# Patient Record
Sex: FEMALE | Race: WHITE | Employment: FULL TIME | URBAN - METROPOLITAN AREA
[De-identification: names, ages, dates, MRNs, and addresses within clinical notes are randomized per-mention and may not be internally consistent; named-entity substitution may affect disease eponyms.]

---

## 2020-08-25 ENCOUNTER — HOSPITAL ENCOUNTER (OUTPATIENT)
Age: 40
Discharge: HOME | End: 2020-08-25
Payer: COMMERCIAL

## 2020-08-25 ENCOUNTER — HOSPITAL ENCOUNTER (OUTPATIENT)
Dept: CT IMAGING | Age: 40
Discharge: HOME | End: 2020-08-27
Payer: COMMERCIAL

## 2020-08-25 LAB
ALBUMIN SERPL-MCNC: 4.1 G/DL (ref 3.5–5.2)
ALP BLD-CCNC: 74 U/L (ref 35–104)
ALT SERPL-CCNC: 94 U/L (ref 0–32)
AMYLASE: 43 U/L (ref 20–100)
ANION GAP SERPL CALCULATED.3IONS-SCNC: 10 MMOL/L (ref 7–16)
AST SERPL-CCNC: 138 U/L (ref 0–31)
BASOPHILS ABSOLUTE: 0.05 E9/L (ref 0–0.2)
BASOPHILS RELATIVE PERCENT: 0.8 % (ref 0–2)
BILIRUB SERPL-MCNC: 1 MG/DL (ref 0–1.2)
BUN BLDV-MCNC: 7 MG/DL (ref 6–20)
CALCIUM SERPL-MCNC: 9.5 MG/DL (ref 8.6–10.2)
CHLORIDE BLD-SCNC: 101 MMOL/L (ref 98–107)
CO2: 26 MMOL/L (ref 22–29)
CREAT SERPL-MCNC: 0.5 MG/DL (ref 0.5–1)
EOSINOPHILS ABSOLUTE: 0.05 E9/L (ref 0.05–0.5)
EOSINOPHILS RELATIVE PERCENT: 0.8 % (ref 0–6)
GFR AFRICAN AMERICAN: >60
GFR NON-AFRICAN AMERICAN: >60 ML/MIN/1.73
GLUCOSE BLD-MCNC: 89 MG/DL (ref 74–99)
HCT VFR BLD CALC: 43.8 % (ref 34–48)
HEMOGLOBIN: 14.8 G/DL (ref 11.5–15.5)
IMMATURE GRANULOCYTES #: 0.02 E9/L
IMMATURE GRANULOCYTES %: 0.3 % (ref 0–5)
LIPASE: 16 U/L (ref 13–60)
LYMPHOCYTES ABSOLUTE: 1.96 E9/L (ref 1.5–4)
LYMPHOCYTES RELATIVE PERCENT: 32.8 % (ref 20–42)
MCH RBC QN AUTO: 33.3 PG (ref 26–35)
MCHC RBC AUTO-ENTMCNC: 33.8 % (ref 32–34.5)
MCV RBC AUTO: 98.4 FL (ref 80–99.9)
MONOCYTES ABSOLUTE: 0.7 E9/L (ref 0.1–0.95)
MONOCYTES RELATIVE PERCENT: 11.7 % (ref 2–12)
NEUTROPHILS ABSOLUTE: 3.2 E9/L (ref 1.8–7.3)
NEUTROPHILS RELATIVE PERCENT: 53.6 % (ref 43–80)
PDW BLD-RTO: 13 FL (ref 11.5–15)
PLATELET # BLD: 186 E9/L (ref 130–450)
PMV BLD AUTO: 11.2 FL (ref 7–12)
POTASSIUM SERPL-SCNC: 4.2 MMOL/L (ref 3.5–5)
RBC # BLD: 4.45 E12/L (ref 3.5–5.5)
SODIUM BLD-SCNC: 137 MMOL/L (ref 132–146)
T4 FREE: 1.05 NG/DL (ref 0.93–1.7)
TOTAL PROTEIN: 6.8 G/DL (ref 6.4–8.3)
TSH SERPL DL<=0.05 MIU/L-ACNC: 4.06 UIU/ML (ref 0.27–4.2)
WBC # BLD: 6 E9/L (ref 4.5–11.5)

## 2020-08-25 PROCEDURE — 84439 ASSAY OF FREE THYROXINE: CPT

## 2020-08-25 PROCEDURE — 6360000004 HC RX CONTRAST MEDICATION: Performed by: RADIOLOGY

## 2020-08-25 PROCEDURE — 83690 ASSAY OF LIPASE: CPT

## 2020-08-25 PROCEDURE — 36415 COLL VENOUS BLD VENIPUNCTURE: CPT

## 2020-08-25 PROCEDURE — 82150 ASSAY OF AMYLASE: CPT

## 2020-08-25 PROCEDURE — 74174 CTA ABD&PLVS W/CONTRAST: CPT

## 2020-08-25 PROCEDURE — 80053 COMPREHEN METABOLIC PANEL: CPT

## 2020-08-25 PROCEDURE — 84443 ASSAY THYROID STIM HORMONE: CPT

## 2020-08-25 PROCEDURE — 85025 COMPLETE CBC W/AUTO DIFF WBC: CPT

## 2020-08-25 RX ADMIN — IOPAMIDOL 100 ML: 755 INJECTION, SOLUTION INTRAVENOUS at 16:53

## 2020-08-31 ENCOUNTER — HOSPITAL ENCOUNTER (OUTPATIENT)
Dept: ULTRASOUND IMAGING | Age: 40
Discharge: HOME | End: 2020-09-02
Payer: COMMERCIAL

## 2020-08-31 PROCEDURE — 76705 ECHO EXAM OF ABDOMEN: CPT

## 2020-09-23 ENCOUNTER — HOSPITAL ENCOUNTER (OUTPATIENT)
Dept: NON INVASIVE DIAGNOSTICS | Age: 40
Discharge: HOME | End: 2020-09-23
Payer: COMMERCIAL

## 2020-09-23 LAB
LV EF: 60 %
LVEF MODALITY: NORMAL

## 2020-09-23 PROCEDURE — 93306 TTE W/DOPPLER COMPLETE: CPT

## 2021-01-08 ENCOUNTER — HOSPITAL ENCOUNTER (OUTPATIENT)
Age: 41
Discharge: HOME | End: 2021-01-10

## 2021-01-08 PROCEDURE — 88305 TISSUE EXAM BY PATHOLOGIST: CPT

## 2021-01-08 PROCEDURE — 87081 CULTURE SCREEN ONLY: CPT

## 2021-01-09 LAB — CLOTEST: NORMAL

## 2021-09-12 ENCOUNTER — HOSPITAL ENCOUNTER (EMERGENCY)
Age: 41
Discharge: HOME | End: 2021-09-12
Attending: STUDENT IN AN ORGANIZED HEALTH CARE EDUCATION/TRAINING PROGRAM
Payer: COMMERCIAL

## 2021-09-12 VITALS
OXYGEN SATURATION: 96 % | DIASTOLIC BLOOD PRESSURE: 84 MMHG | RESPIRATION RATE: 16 BRPM | HEART RATE: 78 BPM | SYSTOLIC BLOOD PRESSURE: 137 MMHG | HEIGHT: 62 IN | WEIGHT: 142 LBS | TEMPERATURE: 97 F | BODY MASS INDEX: 26.13 KG/M2

## 2021-09-12 DIAGNOSIS — F10.20 ALCOHOL USE DISORDER, MODERATE, DEPENDENCE (HCC): Primary | ICD-10-CM

## 2021-09-12 LAB — METER GLUCOSE: 78 MG/DL (ref 74–99)

## 2021-09-12 PROCEDURE — 99284 EMERGENCY DEPT VISIT MOD MDM: CPT

## 2021-09-12 PROCEDURE — 82962 GLUCOSE BLOOD TEST: CPT

## 2021-09-13 NOTE — ED PROVIDER NOTES
Department of Emergency Medicine   ED  Provider Note  Admit Date/RoomTime: 9/12/2021  8:12 PM  ED Room: 19/19          History of Present Illness:  9/12/21, Time: 9:23 PM EDT  Chief Complaint   Patient presents with    Alcohol Problem     pt states she wants to be seen for alcohol abuse. denies any SI         Marilee Duncan is a 42 y.o. female presenting to the ED for Alcohol use. Apparently, the patient has been drinking about 1/5 a day for the past several years. She states she is ready to quit drinking. Denies any organic complaints at this time. She has been drinking today and her last drink was just prior to arrival.  Timing of her symptomatology is constant. She wants to go to an alcohol rehab facility. Denies any coingestions. Review of Systems:  Constitutional: [Denies] fevers  Eyes: [Denies blurry vision]  ENT: [Denies] sore throat  Cardiovascular: [Denies] chest pain  Respiratory: [Denies] shortness of breath  Gastrointestinal: [Denies] abdominal pain  Genitourinary: [Denies] dysuria  Musculoskeletal: [Denies] myalgias  Integumentary: [Denies] rashes  Neurological: [Denies] headache    --------------------------------------------- PAST HISTORY ---------------------------------------------  Past Medical History:  has a past medical history of Cooper-Danlos disease. Past Surgical History:  has a past surgical history that includes Tonsillectomy. Social History:  reports that she has been smoking. She has been smoking about 1.00 pack per day. She has never used smokeless tobacco. She reports current alcohol use. She reports that she does not use drugs. Family History: family history is not on file. . Unless otherwise noted, family history is non contributory    The patients home medications have been reviewed.     Allergies: Penicillins    I have reviewed the past medical history, past surgical history, social history, and family history    ---------------------------------------------------PHYSICAL EXAM--------------------------------------    Constitutional: [Appears in no distress]  Head: [Normocephalic, atraumatic]   Eyes: [Non-icteric slcera, no conjunctival injection]  ENT: [Moist mucous membranes,]  Neck: [Trachea midline, no JVD]  Respiratory: [Nonlabored respirations. Lungs clear to auscultation bilaterally, no wheezes, rales, or rhonchi.]   Cardiovascular:  [Regular rate. Regular rhythm. No murmurs, no gallops, no rubs.]   Gastrointestinal:  [Abdomen Soft, Non tender, Non distended. No rebound tenderness, guarding, or rigidity.]  Extremities: [No lower extremity edema]  Genitourinary: [No CVA tenderness, no suprapubic tenderness]  Musculoskeletal: [Moves all extremities, no deformity]  Skin: [Pink, warm, dry without rash.]  Neurologic: [Alert, symmetric facies, no aphasia]    -------------------------------------------------- RESULTS -------------------------------------------------  I have personally reviewed all laboratory and imaging results for this patient. Results are listed below. LABS: (Lab results interpreted by me)  Results for orders placed or performed during the hospital encounter of 09/12/21   POCT Glucose   Result Value Ref Range    Meter Glucose 78 74 - 99 mg/dL   ,       RADIOLOGY:  Interpreted by Radiologist unless otherwise specified  No orders to display         ------------------------- NURSING NOTES AND VITALS REVIEWED ---------------------------   The nursing notes within the ED encounter and vital signs as below have been reviewed by myself  BP (!) 152/92   Pulse 84   Temp 97 °F (36.1 °C) (Temporal)   Resp 18   Ht 5' 2\" (1.575 m)   Wt 142 lb (64.4 kg)   SpO2 93%   BMI 25.97 kg/m²     Oxygen Saturation Interpretation: [Abnormal]    The patients available past medical records and past encounters were reviewed.         ------------------------------ ED COURSE/MEDICAL DECISION MAKING----------------------  Medications - No data to display     Re-Evaluations: This patient's ED course included: [a personal history and physicial examination]    This patient has [remained hemodynamically stable] during their ED course. Consultations:  None      Medical Decision Making:   Patient presents emergency department looking for resources and help with alcohol use disorder. Vital signs interpreted by myself as hypoxemic at 93% however the patient just finished smoking. She is also hypertensive. Patient was given resources for alcohol use disorder. We unfortunately do not have a social work here today. However, the patient does not wish to be transported from here to an alcohol rehab facility but rather wishes to choose one on her own. Her son will pick her up and drive her home. CBG was obtained and is normal.      Counseling: The emergency provider has spoken with the patient and discussed todays results, in addition to providing specific details for the plan of care and counseling regarding the diagnosis and prognosis. Questions are answered at this time and they are agreeable with the plan.       --------------------------------- IMPRESSION AND DISPOSITION ---------------------------------    IMPRESSION  1. Alcohol use disorder, moderate, dependence ([facility]        DISPOSITION  Disposition: [Discharge to home]  Patient condition is [stable]    Dr. Valerie BURNETTE, am the primary provider of record    Valerie Costa DO  Emergency Medicine    NOTE: This report was transcribed using voice recognition software.  Every effort was made to ensure accuracy; however, inadvertent computerized transcription errors may be present         Ozzie Aponte DO  09/12/21 1461

## 2021-10-14 ENCOUNTER — HOSPITAL ENCOUNTER (INPATIENT)
Age: 41
LOS: 2 days | Discharge: HOME | DRG: 433 | End: 2021-10-16
Attending: EMERGENCY MEDICINE | Admitting: INTERNAL MEDICINE
Payer: COMMERCIAL

## 2021-10-14 ENCOUNTER — APPOINTMENT (OUTPATIENT)
Dept: ULTRASOUND IMAGING | Age: 41
DRG: 433 | End: 2021-10-14
Payer: COMMERCIAL

## 2021-10-14 ENCOUNTER — APPOINTMENT (OUTPATIENT)
Dept: CT IMAGING | Age: 41
DRG: 433 | End: 2021-10-14
Payer: COMMERCIAL

## 2021-10-14 ENCOUNTER — APPOINTMENT (OUTPATIENT)
Dept: GENERAL RADIOLOGY | Age: 41
DRG: 433 | End: 2021-10-14
Payer: COMMERCIAL

## 2021-10-14 DIAGNOSIS — F10.10 ALCOHOL ABUSE: ICD-10-CM

## 2021-10-14 DIAGNOSIS — K72.00 ACUTE LIVER FAILURE WITHOUT HEPATIC COMA: Primary | ICD-10-CM

## 2021-10-14 LAB
ALBUMIN SERPL-MCNC: 3.5 G/DL (ref 3.5–5.2)
ALP BLD-CCNC: 252 U/L (ref 35–104)
ALT SERPL-CCNC: 88 U/L (ref 0–32)
AMPHETAMINE SCREEN, URINE: NOT DETECTED
ANION GAP SERPL CALCULATED.3IONS-SCNC: 19 MMOL/L (ref 7–16)
APTT: 33.1 SEC (ref 24.5–35.1)
AST SERPL-CCNC: 134 U/L (ref 0–31)
BACTERIA: ABNORMAL /HPF
BARBITURATE SCREEN URINE: NOT DETECTED
BASOPHILS ABSOLUTE: 0.03 E9/L (ref 0–0.2)
BASOPHILS RELATIVE PERCENT: 0.3 % (ref 0–2)
BENZODIAZEPINE SCREEN, URINE: NOT DETECTED
BILIRUB SERPL-MCNC: 12.7 MG/DL (ref 0–1.2)
BILIRUBIN URINE: ABNORMAL
BLOOD, URINE: ABNORMAL
BUN BLDV-MCNC: 4 MG/DL (ref 6–20)
CALCIUM SERPL-MCNC: 8.8 MG/DL (ref 8.6–10.2)
CANNABINOID SCREEN URINE: NOT DETECTED
CHLORIDE BLD-SCNC: 91 MMOL/L (ref 98–107)
CLARITY: ABNORMAL
CO2: 25 MMOL/L (ref 22–29)
COCAINE METABOLITE SCREEN URINE: NOT DETECTED
COLOR: ABNORMAL
CREAT SERPL-MCNC: 0.4 MG/DL (ref 0.5–1)
EOSINOPHILS ABSOLUTE: 0.03 E9/L (ref 0.05–0.5)
EOSINOPHILS RELATIVE PERCENT: 0.3 % (ref 0–6)
EPITHELIAL CELLS, UA: ABNORMAL /HPF
FENTANYL SCREEN, URINE: NOT DETECTED
GFR AFRICAN AMERICAN: >60
GFR NON-AFRICAN AMERICAN: >60 ML/MIN/1.73
GLUCOSE BLD-MCNC: 107 MG/DL (ref 74–99)
GLUCOSE URINE: NEGATIVE MG/DL
HCG, URINE, POC: NEGATIVE
HCT VFR BLD CALC: 39 % (ref 34–48)
HEMOGLOBIN: 13.7 G/DL (ref 11.5–15.5)
IMMATURE GRANULOCYTES #: 0.05 E9/L
IMMATURE GRANULOCYTES %: 0.6 % (ref 0–5)
INR BLD: 1.3
KETONES, URINE: 15 MG/DL
LACTIC ACID: 3.1 MMOL/L (ref 0.5–2.2)
LACTIC ACID: 3.5 MMOL/L (ref 0.5–2.2)
LEUKOCYTE ESTERASE, URINE: ABNORMAL
LIPASE: 31 U/L (ref 13–60)
LYMPHOCYTES ABSOLUTE: 1.23 E9/L (ref 1.5–4)
LYMPHOCYTES RELATIVE PERCENT: 13.7 % (ref 20–42)
Lab: NORMAL
Lab: NORMAL
MAGNESIUM: 1.2 MG/DL (ref 1.6–2.6)
MCH RBC QN AUTO: 36.6 PG (ref 26–35)
MCHC RBC AUTO-ENTMCNC: 35.1 % (ref 32–34.5)
MCV RBC AUTO: 104.3 FL (ref 80–99.9)
METHADONE SCREEN, URINE: NOT DETECTED
MONOCYTES ABSOLUTE: 0.75 E9/L (ref 0.1–0.95)
MONOCYTES RELATIVE PERCENT: 8.3 % (ref 2–12)
NEGATIVE QC PASS/FAIL: NORMAL
NEUTROPHILS ABSOLUTE: 6.92 E9/L (ref 1.8–7.3)
NEUTROPHILS RELATIVE PERCENT: 76.8 % (ref 43–80)
NITRITE, URINE: NEGATIVE
OPIATE SCREEN URINE: NOT DETECTED
OXYCODONE URINE: NOT DETECTED
PDW BLD-RTO: 14.7 FL (ref 11.5–15)
PH UA: 6.5 (ref 5–9)
PHENCYCLIDINE SCREEN URINE: NOT DETECTED
PLATELET # BLD: 178 E9/L (ref 130–450)
PMV BLD AUTO: 11.6 FL (ref 7–12)
POSITIVE QC PASS/FAIL: NORMAL
POTASSIUM REFLEX MAGNESIUM: 3 MMOL/L (ref 3.5–5)
PRO-BNP: 334 PG/ML (ref 0–125)
PROTEIN UA: NEGATIVE MG/DL
PROTHROMBIN TIME: 14.1 SEC (ref 9.3–12.4)
RBC # BLD: 3.74 E12/L (ref 3.5–5.5)
RBC UA: ABNORMAL /HPF (ref 0–2)
SODIUM BLD-SCNC: 135 MMOL/L (ref 132–146)
SPECIFIC GRAVITY UA: 1.01 (ref 1–1.03)
TOTAL PROTEIN: 5.8 G/DL (ref 6.4–8.3)
TROPONIN, HIGH SENSITIVITY: 7 NG/L (ref 0–9)
UROBILINOGEN, URINE: 4 E.U./DL
WBC # BLD: 9 E9/L (ref 4.5–11.5)
WBC UA: ABNORMAL /HPF (ref 0–5)

## 2021-10-14 PROCEDURE — 6360000002 HC RX W HCPCS: Performed by: EMERGENCY MEDICINE

## 2021-10-14 PROCEDURE — 74177 CT ABD & PELVIS W/CONTRAST: CPT

## 2021-10-14 PROCEDURE — 81001 URINALYSIS AUTO W/SCOPE: CPT

## 2021-10-14 PROCEDURE — 1200000000 HC SEMI PRIVATE

## 2021-10-14 PROCEDURE — 80179 DRUG ASSAY SALICYLATE: CPT

## 2021-10-14 PROCEDURE — 80053 COMPREHEN METABOLIC PANEL: CPT

## 2021-10-14 PROCEDURE — 83690 ASSAY OF LIPASE: CPT

## 2021-10-14 PROCEDURE — 83880 ASSAY OF NATRIURETIC PEPTIDE: CPT

## 2021-10-14 PROCEDURE — 36415 COLL VENOUS BLD VENIPUNCTURE: CPT

## 2021-10-14 PROCEDURE — 80307 DRUG TEST PRSMV CHEM ANLYZR: CPT

## 2021-10-14 PROCEDURE — 80143 DRUG ASSAY ACETAMINOPHEN: CPT

## 2021-10-14 PROCEDURE — 6360000004 HC RX CONTRAST MEDICATION: Performed by: RADIOLOGY

## 2021-10-14 PROCEDURE — 71046 X-RAY EXAM CHEST 2 VIEWS: CPT

## 2021-10-14 PROCEDURE — 83605 ASSAY OF LACTIC ACID: CPT

## 2021-10-14 PROCEDURE — 84484 ASSAY OF TROPONIN QUANT: CPT

## 2021-10-14 PROCEDURE — 99283 EMERGENCY DEPT VISIT LOW MDM: CPT

## 2021-10-14 PROCEDURE — 85610 PROTHROMBIN TIME: CPT

## 2021-10-14 PROCEDURE — 85025 COMPLETE CBC W/AUTO DIFF WBC: CPT

## 2021-10-14 PROCEDURE — 2580000003 HC RX 258: Performed by: RADIOLOGY

## 2021-10-14 PROCEDURE — 96361 HYDRATE IV INFUSION ADD-ON: CPT

## 2021-10-14 PROCEDURE — 93970 EXTREMITY STUDY: CPT

## 2021-10-14 PROCEDURE — 93005 ELECTROCARDIOGRAM TRACING: CPT | Performed by: PHYSICIAN ASSISTANT

## 2021-10-14 PROCEDURE — 82077 ASSAY SPEC XCP UR&BREATH IA: CPT

## 2021-10-14 PROCEDURE — 83735 ASSAY OF MAGNESIUM: CPT

## 2021-10-14 PROCEDURE — 2580000003 HC RX 258: Performed by: EMERGENCY MEDICINE

## 2021-10-14 PROCEDURE — 85730 THROMBOPLASTIN TIME PARTIAL: CPT

## 2021-10-14 RX ORDER — SODIUM CHLORIDE 0.9 % (FLUSH) 0.9 %
5-40 SYRINGE (ML) INJECTION PRN
Status: DISCONTINUED | OUTPATIENT
Start: 2021-10-14 | End: 2021-10-16 | Stop reason: HOSPADM

## 2021-10-14 RX ORDER — SODIUM CHLORIDE 0.9 % (FLUSH) 0.9 %
10 SYRINGE (ML) INJECTION PRN
Status: COMPLETED | OUTPATIENT
Start: 2021-10-14 | End: 2021-10-14

## 2021-10-14 RX ORDER — LORAZEPAM 2 MG/ML
1 INJECTION INTRAMUSCULAR
Status: DISCONTINUED | OUTPATIENT
Start: 2021-10-14 | End: 2021-10-16 | Stop reason: HOSPADM

## 2021-10-14 RX ORDER — SODIUM CHLORIDE 0.9 % (FLUSH) 0.9 %
10 SYRINGE (ML) INJECTION EVERY 12 HOURS SCHEDULED
Status: DISCONTINUED | OUTPATIENT
Start: 2021-10-14 | End: 2021-10-16 | Stop reason: HOSPADM

## 2021-10-14 RX ORDER — ACETAMINOPHEN 650 MG/1
650 SUPPOSITORY RECTAL EVERY 6 HOURS PRN
Status: DISCONTINUED | OUTPATIENT
Start: 2021-10-14 | End: 2021-10-16 | Stop reason: HOSPADM

## 2021-10-14 RX ORDER — LORAZEPAM 2 MG/ML
2 INJECTION INTRAMUSCULAR
Status: DISCONTINUED | OUTPATIENT
Start: 2021-10-14 | End: 2021-10-16 | Stop reason: HOSPADM

## 2021-10-14 RX ORDER — PANTOPRAZOLE SODIUM 40 MG/1
40 TABLET, DELAYED RELEASE ORAL DAILY
Status: DISCONTINUED | OUTPATIENT
Start: 2021-10-15 | End: 2021-10-16 | Stop reason: HOSPADM

## 2021-10-14 RX ORDER — SODIUM CHLORIDE 9 MG/ML
INJECTION, SOLUTION INTRAVENOUS CONTINUOUS
Status: DISCONTINUED | OUTPATIENT
Start: 2021-10-15 | End: 2021-10-16 | Stop reason: HOSPADM

## 2021-10-14 RX ORDER — LORAZEPAM 2 MG/ML
3 INJECTION INTRAMUSCULAR
Status: DISCONTINUED | OUTPATIENT
Start: 2021-10-14 | End: 2021-10-16 | Stop reason: HOSPADM

## 2021-10-14 RX ORDER — POLYETHYLENE GLYCOL 3350 17 G/17G
17 POWDER, FOR SOLUTION ORAL DAILY PRN
Status: DISCONTINUED | OUTPATIENT
Start: 2021-10-14 | End: 2021-10-16 | Stop reason: HOSPADM

## 2021-10-14 RX ORDER — LORAZEPAM 1 MG/1
3 TABLET ORAL
Status: DISCONTINUED | OUTPATIENT
Start: 2021-10-14 | End: 2021-10-16 | Stop reason: HOSPADM

## 2021-10-14 RX ORDER — POTASSIUM CHLORIDE 7.45 MG/ML
10 INJECTION INTRAVENOUS PRN
Status: DISCONTINUED | OUTPATIENT
Start: 2021-10-14 | End: 2021-10-16 | Stop reason: HOSPADM

## 2021-10-14 RX ORDER — LORAZEPAM 1 MG/1
4 TABLET ORAL
Status: DISCONTINUED | OUTPATIENT
Start: 2021-10-14 | End: 2021-10-16 | Stop reason: HOSPADM

## 2021-10-14 RX ORDER — POTASSIUM CHLORIDE 20 MEQ/1
40 TABLET, EXTENDED RELEASE ORAL PRN
Status: DISCONTINUED | OUTPATIENT
Start: 2021-10-14 | End: 2021-10-16 | Stop reason: HOSPADM

## 2021-10-14 RX ORDER — LORAZEPAM 1 MG/1
1 TABLET ORAL
Status: DISCONTINUED | OUTPATIENT
Start: 2021-10-14 | End: 2021-10-16 | Stop reason: HOSPADM

## 2021-10-14 RX ORDER — MAGNESIUM SULFATE IN WATER 40 MG/ML
4000 INJECTION, SOLUTION INTRAVENOUS ONCE
Status: COMPLETED | OUTPATIENT
Start: 2021-10-14 | End: 2021-10-15

## 2021-10-14 RX ORDER — POTASSIUM CHLORIDE 20 MEQ/1
40 TABLET, EXTENDED RELEASE ORAL ONCE
Status: COMPLETED | OUTPATIENT
Start: 2021-10-14 | End: 2021-10-15

## 2021-10-14 RX ORDER — SODIUM CHLORIDE 0.9 % (FLUSH) 0.9 %
5-40 SYRINGE (ML) INJECTION EVERY 12 HOURS SCHEDULED
Status: DISCONTINUED | OUTPATIENT
Start: 2021-10-15 | End: 2021-10-16 | Stop reason: HOSPADM

## 2021-10-14 RX ORDER — 0.9 % SODIUM CHLORIDE 0.9 %
1000 INTRAVENOUS SOLUTION INTRAVENOUS ONCE
Status: COMPLETED | OUTPATIENT
Start: 2021-10-14 | End: 2021-10-14

## 2021-10-14 RX ORDER — SENNA PLUS 8.6 MG/1
1 TABLET ORAL DAILY PRN
Status: DISCONTINUED | OUTPATIENT
Start: 2021-10-14 | End: 2021-10-16 | Stop reason: HOSPADM

## 2021-10-14 RX ORDER — SODIUM CHLORIDE 9 MG/ML
25 INJECTION, SOLUTION INTRAVENOUS PRN
Status: DISCONTINUED | OUTPATIENT
Start: 2021-10-14 | End: 2021-10-16 | Stop reason: HOSPADM

## 2021-10-14 RX ORDER — ACETAMINOPHEN 325 MG/1
650 TABLET ORAL EVERY 6 HOURS PRN
Status: DISCONTINUED | OUTPATIENT
Start: 2021-10-14 | End: 2021-10-16 | Stop reason: HOSPADM

## 2021-10-14 RX ORDER — LORAZEPAM 1 MG/1
2 TABLET ORAL
Status: DISCONTINUED | OUTPATIENT
Start: 2021-10-14 | End: 2021-10-16 | Stop reason: HOSPADM

## 2021-10-14 RX ORDER — POTASSIUM CHLORIDE 7.45 MG/ML
10 INJECTION INTRAVENOUS ONCE
Status: COMPLETED | OUTPATIENT
Start: 2021-10-14 | End: 2021-10-14

## 2021-10-14 RX ORDER — LANOLIN ALCOHOL/MO/W.PET/CERES
100 CREAM (GRAM) TOPICAL DAILY
Status: DISCONTINUED | OUTPATIENT
Start: 2021-10-15 | End: 2021-10-16 | Stop reason: HOSPADM

## 2021-10-14 RX ORDER — ONDANSETRON 4 MG/1
8 TABLET, ORALLY DISINTEGRATING ORAL EVERY 8 HOURS PRN
Status: DISCONTINUED | OUTPATIENT
Start: 2021-10-14 | End: 2021-10-14

## 2021-10-14 RX ORDER — SODIUM CHLORIDE 0.9 % (FLUSH) 0.9 %
10 SYRINGE (ML) INJECTION PRN
Status: DISCONTINUED | OUTPATIENT
Start: 2021-10-14 | End: 2021-10-16 | Stop reason: HOSPADM

## 2021-10-14 RX ORDER — LORAZEPAM 2 MG/ML
4 INJECTION INTRAMUSCULAR
Status: DISCONTINUED | OUTPATIENT
Start: 2021-10-14 | End: 2021-10-16 | Stop reason: HOSPADM

## 2021-10-14 RX ADMIN — POTASSIUM CHLORIDE 10 MEQ: 7.46 INJECTION, SOLUTION INTRAVENOUS at 23:39

## 2021-10-14 RX ADMIN — SODIUM CHLORIDE 1000 ML: 9 INJECTION, SOLUTION INTRAVENOUS at 22:04

## 2021-10-14 RX ADMIN — Medication 10 ML: at 21:28

## 2021-10-14 RX ADMIN — IOPAMIDOL 75 ML: 755 INJECTION, SOLUTION INTRAVENOUS at 21:31

## 2021-10-14 ASSESSMENT — ENCOUNTER SYMPTOMS
ABDOMINAL DISTENTION: 0
WHEEZING: 0
SINUS PRESSURE: 0
EYE PAIN: 0
EYE REDNESS: 0
COLOR CHANGE: 1
CONSTIPATION: 0
HEMATEMESIS: 0
SHORTNESS OF BREATH: 0
DIARRHEA: 0
BELCHING: 0
ABDOMINAL PAIN: 1
VOMITING: 0
NAUSEA: 0
SORE THROAT: 0
EYE DISCHARGE: 0
HEMATOCHEZIA: 0
COUGH: 0
BACK PAIN: 0

## 2021-10-14 ASSESSMENT — PAIN SCALES - GENERAL: PAINLEVEL_OUTOF10: 4

## 2021-10-14 NOTE — ED NOTES
FIRST PROVIDER CONTACT ASSESSMENT NOTE      Department of Emergency Medicine   10/14/21  5:04 PM EDT    Chief Complaint: Leg Swelling (bilaterally) and Jaundice (eyes, hx of liver problems. Sent in by PCP)      History of Present Illness:   Liliya Tipton is a 42 y.o. female who presents to the ED for scleral icterus and bilateral leg swelling. Notes that she does drink alcohol and is in a program. Last time she drank alcohol was last night as the program told her not to try to detox herself. States her R upper abdomen is swollen. States she has noticed the eyes were yellow last Friday and the legs have been swollen on and off for a while but worsened Friday. Also notes dysuria and decreased urinary output. Denies CP, SOB, fever, chills, NVD. Medical History:  has a past medical history of Cooper-Danlos disease. Surgical History:  has a past surgical history that includes Tonsillectomy. Social History:  reports that she has been smoking. She has been smoking about 1.00 pack per day. She has never used smokeless tobacco. She reports current alcohol use. She reports that she does not use drugs. Family History: family history is not on file.     *ALLERGIES*     Erythromycin and Penicillins     Physical Exam:      VS:  BP (!) 149/88   Pulse 122   Temp 97.6 °F (36.4 °C)   Resp 16   Ht 5' 2\" (1.575 m)   Wt 142 lb (64.4 kg)   SpO2 97%   BMI 25.97 kg/m²      Initial Plan of Care:  Initiate Treatment-Testing, Proceed toTreatment Area When Bed Available for ED Attending/MLP to Continue Care    Liver is palpated in the RUQ with some tenderness  Scleral icterus bilaterally  Mild calf pain bilaterally  No cva tenderness bilaterally  nonsurgical abdomen; no rebound tenderness, guarding, or rigidity    Patient told to let us know for any new or worsening symptoms as she sits in the waiting; cardiac labs ordered due to tachycardia; no CP or SOB    -----------------END OF FIRST PROVIDER CONTACT ASSESSMENT NOTE--------------  Electronically signed by Heidy Pierce PA-C   DD: 10/14/21             Alex Ng PA-C  10/14/21 [id number]

## 2021-10-15 LAB
ACETAMINOPHEN LEVEL: <5 MCG/ML (ref 10–30)
ALBUMIN SERPL-MCNC: 3.2 G/DL (ref 3.5–5.2)
ALP BLD-CCNC: 233 U/L (ref 35–104)
ALT SERPL-CCNC: 74 U/L (ref 0–32)
AMMONIA: 46.5 UMOL/L (ref 11–51)
ANION GAP SERPL CALCULATED.3IONS-SCNC: 15 MMOL/L (ref 7–16)
AST SERPL-CCNC: 131 U/L (ref 0–31)
BASOPHILS ABSOLUTE: 0.02 E9/L (ref 0–0.2)
BASOPHILS RELATIVE PERCENT: 0.3 % (ref 0–2)
BILIRUB SERPL-MCNC: 12.3 MG/DL (ref 0–1.2)
BILIRUBIN DIRECT: 10.2 MG/DL (ref 0–0.3)
BUN BLDV-MCNC: 3 MG/DL (ref 6–20)
CALCIUM SERPL-MCNC: 8.2 MG/DL (ref 8.6–10.2)
CHLORIDE BLD-SCNC: 97 MMOL/L (ref 98–107)
CO2: 26 MMOL/L (ref 22–29)
CREAT SERPL-MCNC: 0.4 MG/DL (ref 0.5–1)
EKG ATRIAL RATE: 112 BPM
EKG P AXIS: 55 DEGREES
EKG P-R INTERVAL: 136 MS
EKG Q-T INTERVAL: 368 MS
EKG QRS DURATION: 74 MS
EKG QTC CALCULATION (BAZETT): 502 MS
EKG R AXIS: 31 DEGREES
EKG T AXIS: 55 DEGREES
EKG VENTRICULAR RATE: 112 BPM
EOSINOPHILS ABSOLUTE: 0.08 E9/L (ref 0.05–0.5)
EOSINOPHILS RELATIVE PERCENT: 1.1 % (ref 0–6)
ETHANOL: <10 MG/DL (ref 0–0.08)
FERRITIN: 935 NG/ML
GAMMA GLUTAMYL TRANSFERASE: 1936 U/L (ref 6–42)
GFR AFRICAN AMERICAN: >60
GFR NON-AFRICAN AMERICAN: >60 ML/MIN/1.73
GLUCOSE BLD-MCNC: 97 MG/DL (ref 74–99)
HCT VFR BLD CALC: 35 % (ref 34–48)
HEMOGLOBIN: 12.3 G/DL (ref 11.5–15.5)
IMMATURE GRANULOCYTES #: 0.03 E9/L
IMMATURE GRANULOCYTES %: 0.4 % (ref 0–5)
INR BLD: 1.2
INR BLD: 1.4
IRON SATURATION: 122 % (ref 15–50)
IRON: 123 MCG/DL (ref 37–145)
LYMPHOCYTES ABSOLUTE: 1.09 E9/L (ref 1.5–4)
LYMPHOCYTES RELATIVE PERCENT: 15 % (ref 20–42)
MAGNESIUM: 2.2 MG/DL (ref 1.6–2.6)
MCH RBC QN AUTO: 36.6 PG (ref 26–35)
MCHC RBC AUTO-ENTMCNC: 35.1 % (ref 32–34.5)
MCV RBC AUTO: 104.2 FL (ref 80–99.9)
MONOCYTES ABSOLUTE: 0.45 E9/L (ref 0.1–0.95)
MONOCYTES RELATIVE PERCENT: 6.2 % (ref 2–12)
NEUTROPHILS ABSOLUTE: 5.61 E9/L (ref 1.8–7.3)
NEUTROPHILS RELATIVE PERCENT: 77 % (ref 43–80)
PDW BLD-RTO: 15 FL (ref 11.5–15)
PLATELET # BLD: 174 E9/L (ref 130–450)
PMV BLD AUTO: 11.7 FL (ref 7–12)
POTASSIUM REFLEX MAGNESIUM: 3.2 MMOL/L (ref 3.5–5)
PROTHROMBIN TIME: 13.4 SEC (ref 9.3–12.4)
PROTHROMBIN TIME: 14.6 SEC (ref 9.3–12.4)
RBC # BLD: 3.36 E12/L (ref 3.5–5.5)
SALICYLATE, SERUM: <0.3 MG/DL (ref 0–30)
SODIUM BLD-SCNC: 138 MMOL/L (ref 132–146)
T4 TOTAL: 6.1 MCG/DL (ref 4.5–11.7)
TOTAL IRON BINDING CAPACITY: 101 MCG/DL (ref 250–450)
TOTAL PROTEIN: 5.1 G/DL (ref 6.4–8.3)
TRICYCLIC ANTIDEPRESSANTS SCREEN SERUM: NEGATIVE NG/ML
TSH SERPL DL<=0.05 MIU/L-ACNC: 2.18 UIU/ML (ref 0.27–4.2)
WBC # BLD: 7.3 E9/L (ref 4.5–11.5)

## 2021-10-15 PROCEDURE — 82248 BILIRUBIN DIRECT: CPT

## 2021-10-15 PROCEDURE — 2580000003 HC RX 258: Performed by: INTERNAL MEDICINE

## 2021-10-15 PROCEDURE — 1200000000 HC SEMI PRIVATE

## 2021-10-15 PROCEDURE — 82728 ASSAY OF FERRITIN: CPT

## 2021-10-15 PROCEDURE — 36415 COLL VENOUS BLD VENIPUNCTURE: CPT

## 2021-10-15 PROCEDURE — 82787 IGG 1 2 3 OR 4 EACH: CPT

## 2021-10-15 PROCEDURE — 80074 ACUTE HEPATITIS PANEL: CPT

## 2021-10-15 PROCEDURE — 83540 ASSAY OF IRON: CPT

## 2021-10-15 PROCEDURE — 84443 ASSAY THYROID STIM HORMONE: CPT

## 2021-10-15 PROCEDURE — 82105 ALPHA-FETOPROTEIN SERUM: CPT

## 2021-10-15 PROCEDURE — 85610 PROTHROMBIN TIME: CPT

## 2021-10-15 PROCEDURE — G0378 HOSPITAL OBSERVATION PER HR: HCPCS

## 2021-10-15 PROCEDURE — 96361 HYDRATE IV INFUSION ADD-ON: CPT

## 2021-10-15 PROCEDURE — 86255 FLUORESCENT ANTIBODY SCREEN: CPT

## 2021-10-15 PROCEDURE — 80053 COMPREHEN METABOLIC PANEL: CPT

## 2021-10-15 PROCEDURE — 84436 ASSAY OF TOTAL THYROXINE: CPT

## 2021-10-15 PROCEDURE — 85025 COMPLETE CBC W/AUTO DIFF WBC: CPT

## 2021-10-15 PROCEDURE — 96366 THER/PROPH/DIAG IV INF ADDON: CPT

## 2021-10-15 PROCEDURE — 82140 ASSAY OF AMMONIA: CPT

## 2021-10-15 PROCEDURE — 86038 ANTINUCLEAR ANTIBODIES: CPT

## 2021-10-15 PROCEDURE — 6360000002 HC RX W HCPCS: Performed by: INTERNAL MEDICINE

## 2021-10-15 PROCEDURE — 83735 ASSAY OF MAGNESIUM: CPT

## 2021-10-15 PROCEDURE — 6370000000 HC RX 637 (ALT 250 FOR IP): Performed by: INTERNAL MEDICINE

## 2021-10-15 PROCEDURE — 96365 THER/PROPH/DIAG IV INF INIT: CPT

## 2021-10-15 PROCEDURE — 82977 ASSAY OF GGT: CPT

## 2021-10-15 PROCEDURE — 82784 ASSAY IGA/IGD/IGG/IGM EACH: CPT

## 2021-10-15 PROCEDURE — 82103 ALPHA-1-ANTITRYPSIN TOTAL: CPT

## 2021-10-15 PROCEDURE — 83550 IRON BINDING TEST: CPT

## 2021-10-15 RX ADMIN — PANTOPRAZOLE SODIUM 40 MG: 40 TABLET, DELAYED RELEASE ORAL at 08:44

## 2021-10-15 RX ADMIN — POTASSIUM CHLORIDE 40 MEQ: 1500 TABLET, EXTENDED RELEASE ORAL at 12:08

## 2021-10-15 RX ADMIN — SODIUM CHLORIDE: 9 INJECTION, SOLUTION INTRAVENOUS at 14:45

## 2021-10-15 RX ADMIN — SODIUM CHLORIDE: 9 INJECTION, SOLUTION INTRAVENOUS at 05:26

## 2021-10-15 RX ADMIN — MAGNESIUM SULFATE 4000 MG: 4 INJECTION INTRAVENOUS at 01:00

## 2021-10-15 RX ADMIN — POTASSIUM CHLORIDE 40 MEQ: 1500 TABLET, EXTENDED RELEASE ORAL at 00:58

## 2021-10-15 RX ADMIN — Medication 100 MG: at 08:44

## 2021-10-15 ASSESSMENT — ENCOUNTER SYMPTOMS
SHORTNESS OF BREATH: 0
APNEA: 0
ABDOMINAL PAIN: 0
COLOR CHANGE: 1
ABDOMINAL DISTENTION: 0
NAUSEA: 0
BACK PAIN: 0
WHEEZING: 0
DIARRHEA: 0
PHOTOPHOBIA: 0
CHEST TIGHTNESS: 0
BLOOD IN STOOL: 0
CONSTIPATION: 0
RHINORRHEA: 0
VOMITING: 0

## 2021-10-15 ASSESSMENT — PAIN SCALES - GENERAL
PAINLEVEL_OUTOF10: 0

## 2021-10-15 NOTE — PROGRESS NOTES
Retrieved from patient two bottles of Chlordiazepoxide Hydrochloride. Meds placed in patient's  med room.

## 2021-10-15 NOTE — ED PROVIDER NOTES
48-year-old female history of alcohol abuse states her last drink was 7 PM yesterday presents to the emergency department after she was told that she needs evaluated medically because her eyes were yellow and she is having some abdominal pain with decreased p.o. intake. Patient admits to a history of alcohol abuse and has been trying to get into a detox facility for help with treatment. She was told in the day she could have a bed at [facility] in [place]. However not being able to eat and other things it was felt the patient's medical needs were important and she came in for further evaluation for this. She states some vague abdominal pain in the upper right abdomen. She states no urinary symptoms leg swelling abdominal pain or other complaints. The history is provided by the patient. Abdominal Pain  Pain location:  Epigastric and RUQ  Pain quality: dull    Pain radiates to:  Does not radiate  Pain severity:  Mild  Onset quality:  Gradual  Duration:  3 days  Timing:  Intermittent  Progression:  Waxing and waning  Chronicity:  New  Relieved by:  Nothing  Worsened by:  Nothing  Ineffective treatments:  None tried  Associated symptoms: anorexia    Associated symptoms: no belching, no chest pain, no chills, no constipation, no cough, no diarrhea, no dysuria, no fatigue, no fever, no hematemesis, no hematochezia, no hematuria, no nausea, no shortness of breath, no sore throat and no vomiting    Risk factors: alcohol abuse         Review of Systems   Constitutional: Positive for appetite change. Negative for chills, fatigue and fever. HENT: Negative for ear pain, sinus pressure and sore throat. Eyes: Negative for pain, discharge and redness. Respiratory: Negative for cough, shortness of breath and wheezing. Cardiovascular: Positive for leg swelling. Negative for chest pain. Gastrointestinal: Positive for abdominal pain and anorexia.  Negative for abdominal distention, constipation, diarrhea, hematemesis, hematochezia, nausea and vomiting. Genitourinary: Negative for dysuria, frequency and hematuria. Musculoskeletal: Negative for arthralgias and back pain. Skin: Positive for color change. Negative for rash and wound. Neurological: Negative for weakness and headaches. Hematological: Negative for adenopathy. All other systems reviewed and are negative. Physical Exam  Constitutional:       Appearance: Normal appearance. HENT:      Head: Normocephalic and atraumatic. Nose: Nose normal.      Mouth/Throat:      Mouth: Mucous membranes are moist.   Eyes:      General: Scleral icterus present. Cardiovascular:      Rate and Rhythm: Regular rhythm. Tachycardia present. Pulses: Normal pulses. Heart sounds: Normal heart sounds. Pulmonary:      Effort: Pulmonary effort is normal.      Breath sounds: Normal breath sounds. Abdominal:      General: Abdomen is flat. Bowel sounds are normal.      Palpations: Abdomen is soft. Tenderness: There is abdominal tenderness in the right upper quadrant and epigastric area. Musculoskeletal:         General: Normal range of motion. Skin:     General: Skin is warm. Capillary Refill: Capillary refill takes less than 2 seconds. Coloration: Skin is jaundiced. Neurological:      General: No focal deficit present. Mental Status: She is alert and oriented to person, place, and time. Procedures     MDM  Number of Diagnoses or Management Options  Diagnosis management comments: Patient seen and examined. Labs and imaging were ordered. CT scan reveals liver hepatosteatosis. Bilirubin noted to be 12.7 lactic acid elevated IV fluids were given to reassess this and see if patient can safely be admitted to telemetry bed.   Case was discussed with Dr. Ada Grijalva after reviewing all findings and patient was admitted to the hospital for further evaluation.             --------------------------------------------- PAST HISTORY ---------------------------------------------  Past Medical History:  has a past medical history of Cooper-Danlos disease. Past Surgical History:  has a past surgical history that includes Tonsillectomy. Social History:  reports that she has been smoking. She has been smoking about 1.00 pack per day. She has never used smokeless tobacco. She reports current alcohol use. She reports that she does not use drugs. Family History: family history is not on file. The patients home medications have been reviewed.     Allergies: Erythromycin and Penicillins    -------------------------------------------------- RESULTS -------------------------------------------------    LABS:  Results for orders placed or performed during the hospital encounter of 10/14/21   CBC Auto Differential   Result Value Ref Range    WBC 9.0 4.5 - 11.5 E9/L    RBC 3.74 3.50 - 5.50 E12/L    Hemoglobin 13.7 11.5 - 15.5 g/dL    Hematocrit 39.0 34.0 - 48.0 %    .3 (H) 80.0 - 99.9 fL    MCH 36.6 (H) 26.0 - 35.0 pg    MCHC 35.1 (H) 32.0 - 34.5 %    RDW 14.7 11.5 - 15.0 fL    Platelets 461 633 - 366 E9/L    MPV 11.6 7.0 - 12.0 fL    Neutrophils % 76.8 43.0 - 80.0 %    Immature Granulocytes % 0.6 0.0 - 5.0 %    Lymphocytes % 13.7 (L) 20.0 - 42.0 %    Monocytes % 8.3 2.0 - 12.0 %    Eosinophils % 0.3 0.0 - 6.0 %    Basophils % 0.3 0.0 - 2.0 %    Neutrophils Absolute 6.92 1.80 - 7.30 E9/L    Immature Granulocytes # 0.05 E9/L    Lymphocytes Absolute 1.23 (L) 1.50 - 4.00 E9/L    Monocytes Absolute 0.75 0.10 - 0.95 E9/L    Eosinophils Absolute 0.03 (L) 0.05 - 0.50 E9/L    Basophils Absolute 0.03 0.00 - 0.20 E9/L   Comprehensive Metabolic Panel w/ Reflex to MG   Result Value Ref Range    Sodium 135 132 - 146 mmol/L    Potassium reflex Magnesium 3.0 (L) 3.5 - 5.0 mmol/L    Chloride 91 (L) 98 - 107 mmol/L    CO2 25 22 - 29 mmol/L    Anion Gap 19 (H) 7 - 16 mmol/L    Glucose 107 (H) 74 - 99 mg/dL    BUN 4 (L) 6 - 20 mg/dL    CREATININE 0.4 (L) 0.5 - 1.0 mg/dL    GFR Non-African American >60 >=60 mL/min/1.73    GFR African American >60     Calcium 8.8 8.6 - 10.2 mg/dL    Total Protein 5.8 (L) 6.4 - 8.3 g/dL    Albumin 3.5 3.5 - 5.2 g/dL    Total Bilirubin 12.7 (H) 0.0 - 1.2 mg/dL    Alkaline Phosphatase 252 (H) 35 - 104 U/L    ALT 88 (H) 0 - 32 U/L     (H) 0 - 31 U/L   Protime-INR   Result Value Ref Range    Protime 14.1 (H) 9.3 - 12.4 sec    INR 1.3    APTT   Result Value Ref Range    aPTT 33.1 24.5 - 35.1 sec   Lipase   Result Value Ref Range    Lipase 31 13 - 60 U/L   LACTIC ACID, PLASMA   Result Value Ref Range    Lactic Acid 3.5 (HH) 0.5 - 2.2 mmol/L   Urinalysis with Microscopic   Result Value Ref Range    Color, UA WANG (A) Straw/Yellow    Clarity, UA CLOUDY (A) Clear    Glucose, Ur Negative Negative mg/dL    Bilirubin Urine LARGE (A) Negative    Ketones, Urine 15 (A) Negative mg/dL    Specific Gravity, UA 1.010 1.005 - 1.030    Blood, Urine TRACE-INTACT Negative    pH, UA 6.5 5.0 - 9.0    Protein, UA Negative Negative mg/dL    Urobilinogen, Urine 4.0 (A) <2.0 E.U./dL    Nitrite, Urine Negative Negative    Leukocyte Esterase, Urine TRACE (A) Negative    WBC, UA 5-10 (A) 0 - 5 /HPF    RBC, UA 1-3 0 - 2 /HPF    Epithelial Cells, UA MODERATE /HPF    Bacteria, UA MANY (A) None Seen /HPF   Troponin   Result Value Ref Range    Troponin, High Sensitivity 7 0 - 9 ng/L   Brain Natriuretic Peptide   Result Value Ref Range    Pro- (H) 0 - 125 pg/mL   Magnesium   Result Value Ref Range    Magnesium 1.2 (L) 1.6 - 2.6 mg/dL   Serum Drug Screen   Result Value Ref Range    Ethanol Lvl <10 mg/dL    Acetaminophen Level <5.0 (L) 10.0 - 79.5 mcg/mL    Salicylate, Serum <6.8 0.0 - 30.0 mg/dL   Urine Drug Screen   Result Value Ref Range    Amphetamine Screen, Urine NOT DETECTED Negative <1000 ng/mL    Barbiturate Screen, Ur NOT DETECTED Negative < 200 ng/mL    Benzodiazepine Screen, Urine NOT DETECTED Negative < 200 ng/mL    Cannabinoid Scrn, Ur NOT DETECTED Negative < 50ng/mL    Cocaine Metabolite Screen, Urine NOT DETECTED Negative < 300 ng/mL    Opiate Scrn, Ur NOT DETECTED Negative < 300ng/mL    PCP Screen, Urine NOT DETECTED Negative < 25 ng/mL    Methadone Screen, Urine NOT DETECTED Negative <300 ng/mL    Oxycodone Urine NOT DETECTED Negative <100 ng/mL    FENTANYL SCREEN, URINE NOT DETECTED Negative <1 ng/mL    Drug Screen Comment: see below    POC Pregnancy Urine Qual   Result Value Ref Range    HCG, Urine, POC Negative Negative    Lot Number [id number]     Positive QC Pass/Fail Pass     Negative QC Pass/Fail Pass    EKG 12 Lead   Result Value Ref Range    Ventricular Rate 112 BPM    Atrial Rate 112 BPM    P-R Interval 136 ms    QRS Duration 74 ms    Q-T Interval 368 ms    QTc Calculation (Bazett) 502 ms    P Axis 55 degrees    R Axis 31 degrees    T Axis 55 degrees       RADIOLOGY:  XR CHEST (2 VW)    Result Date: 10/14/2021  EXAMINATION: TWO XRAY VIEWS OF THE CHEST 10/14/2021 5:11 pm COMPARISON: None. HISTORY: ORDERING SYSTEM PROVIDED HISTORY: cardiac work-up TECHNOLOGIST PROVIDED HISTORY: Reason for exam:->cardiac work-up FINDINGS: The heart size is normal.  There are no infiltrates or effusions. Normal chest     US DUP LOWER EXTREMITIES BILATERAL VENOUS    Result Date: 10/14/2021  EXAMINATION: DUPLEX VENOUS ULTRASOUND OF THE BILATERAL LOWER WFDELGROUEB19/14/2021 6:16 pm TECHNIQUE: Duplex ultrasound using B-mode/gray scaled imaging, Doppler spectral analysis and color flow Doppler was obtained of the deep venous structures of the lower bilateral extremities. COMPARISON: None used HISTORY: ORDERING SYSTEM PROVIDED HISTORY: dvt TECHNOLOGIST PROVIDED HISTORY: Reason for exam:->dvt What reading provider will be dictating this exam?->CRC FINDINGS: The visualized veins of the bilateral lower extremities are patent and free of echogenic thrombus. The veins demonstrate good compressibility with normal color flow study and spectral analysis.      No evidence of DVT in either lower extremity.       ------------------------- NURSING NOTES AND VITALS REVIEWED ---------------------------  Date / Time Roomed:  10/14/2021  8:06 PM  ED Bed Assignment:  08/08    The nursing notes within the ED encounter and vital signs as below have been reviewed. Patient Vitals for the past 24 hrs:   BP Temp Pulse Resp SpO2 Height Weight   10/14/21 2258 123/62 -- 101 16 97 % -- --   10/14/21 1959 -- -- 112 -- -- -- --   10/14/21 1703 (!) 149/88 97.6 °F (36.4 °C) 122 16 97 % 5' 2\" (1.575 m) 142 lb (64.4 kg)       Oxygen Saturation Interpretation: Normal    ------------------------------------------ PROGRESS NOTES ------------------------------------------  Counseling:  I have spoken with the patient and discussed todays results, in addition to providing specific details for the plan of care and counseling regarding the diagnosis and prognosis. Their questions are answered at this time and they are agreeable with the plan of admission.    --------------------------------- ADDITIONAL PROVIDER NOTES ---------------------------------  This patient's ED course included: a personal history and physicial examination, re-evaluation prior to disposition, multiple bedside re-evaluations, IV medications, cardiac monitoring and continuous pulse oximetry    This patient has remained hemodynamically stable during their ED course. Diagnosis:  1. Acute liver failure without hepatic coma    2. Alcohol abuse        Disposition:  Patient's disposition: Admit to telemetry  Patient's condition is fair.          Yoan Gray DO  10/14/21 [id number]

## 2021-10-15 NOTE — CARE COORDINATION
Social Work  / Discharge Planning: SW met with patient and explained role. Plan at discharge is back HOME. Patient independent. As far as substance treatment, patient already has her appointments set up through [facility] all next week starting Tuesday. Patient plan is NOT inpatient due to work but she has scheduled appointments to start outpatient services. Patient denies any additional needs. Patient has PCP and insurance. SW to follow.  Electronically signed by LADARIUS Teran on 10/15/21 at 2:08 PM EDT

## 2021-10-15 NOTE — CONSULTS
Gastroenterology Consult Note   [facility] with Elyssa Quick M.D. Consult Note      Date of Service: 10/15/2021  Reason for Consult: Acute Liver failure, Alcohol abuse. Requesting Physician: Dr. Dalton Hidden:  Abdominal pain, decreased appetite, and jaundice    History Obtained From:  patient, electronic medical record    HISTORY OF PRESENT ILLNESS:       Max Sahni is a 42 y.o. female with significant past medical history of alcohol abuse and Cooper-Danlos disease admitted via ED for abdominal pain, decreased appetite, and jaundice. Pt reports last Friday she noted her eyes to appear yellow. States she was here about a month ago, ED note from 9/12/2021 reviewed. Patient was seeking assistance to stop drinking alcohol, per note at that time patient admitted to drinking 1/5 of alcohol per day for several years. She states she was given information and called [facility], had been working with them most recently stating she started to [employer]" so she called [facility] in [place] and was accepted however states she cannot do an inpatient program due to her job. Patient states she had noted her eyes to appear jaundiced since Friday associated with abdominal \"discomfort\" RUQ stating she feels as though that region is swollen causing her ribs to hurt rating the discomfort 1-2/10. She states she has had the discomfort for about a week and a half but it is nothing significant that holds her down. She reports intermittent nausea and vomiting of water to foam.  States she has lost 60#/1 year stating it is due to her drinking because she has lack of appetite. Patient states BMs are daily to every other day, brown in color. Patient denies hematemesis, hematochezia, melena, chills, or fever.   States she is somewhat anxious, asking for very low-dose of Ativan stating this is the first time her mind has been clear in weeks and she does not want to fog it-charge nurse notified to have RN medicate patient. Patient states it is more difficult for her to be away from the tobacco as she is a smoker, been the alcohol. States she drinks \"straight up tequila\" of about 1/5 in a 5-day week, \"much more on the weekends,\" states her last drink was 7 PM on Wednesday. Later states her  shares that 1/5 with her per week. Admission labs: Albumin 3.5; alk phos 252; ALT 88; ; bilirubin 12.7; total protein 5.8; potassium 3.0; chloride 91; BUN 4; creatinine 1.4; AG 19; mag 1.2; LA 3.5; glucose 107; proBNP 334; H&H 13.7 & 39; .3; MCH 36.6; MCHC 35.1; lymphs 13.7%; absolute lymphs 1.23; absolute eos 0.03; INR 1.3. Tox screen and ethanol negative. CT abdomen/pelvis with IV contrast - Severe diffuse hepatic steatosis, similar to prior study from 08/25/2020. No evidence of biliary obstruction. Please note that jaundice in the context of severe diffuse hepatic steatosis could represent steatohepatitis. 7 mm non-obstructing right nephrolith. US BLE - No evidence of DVT in either lower extremity. Consultation for Acute Liver failure, Alcohol abuse. Pt is known to Dr. Ethan Vo, she was initially seen in the office 10/8/2020 for steatosis of the liver with abnormal liver enzymes, labs and ultrasound ordered, she never had labs done. Ultrasound elastography at that time showed normal F1 fibrosis and fatty liver disease. She had EGD 1/8/2021 which showed GERD, small hiatal hernia, no varices, and gastritis. BRYANT was negative. Sprue showed peptic duodenitis. She was lost to follow-up. Currently, pt reports no abdominal pain, nausea, or vomiting. Last BM yesterday. Patient states she is somewhat anxious not being able to smoke, denies having alcohol withdrawal.  States her  also drinks alcohol daily and he was here this morning \"for about 10 minutes and I had to make him leave. \"  Labs today: Albumin 3.2; alk phos 233; ALT 74; ammonia 46.5; ; bilirubin 12.3; direct bili 10.2; total protein 5.1; potassium 3.2; chloride 97; BUN 3; creatinine 0.4; calcium 8.2; RBC 3.36; H&H 12.3 & 35; .2; MCH 36.6; MCHC 35.1; lymph 15%; absolute lymph 1.09; INR 1.2.     Past Medical History:        Diagnosis Date    Cooper-Danlos disease      Past Surgical History:        Procedure Laterality Date    TONSILLECTOMY       Current Medications:    Current Facility-Administered Medications: influenza quadrivalent split vaccine (FLUZONE;FLUARIX;FLULAVAL;AFLURIA) injection 0.5 mL, 0.5 mL, IntraMUSCular, Prior to discharge  sodium chloride flush 0.9 % injection 10 mL, 10 mL, IntraVENous, 2 times per day  sodium chloride flush 0.9 % injection 10 mL, 10 mL, IntraVENous, PRN  0.9 % sodium chloride infusion, 25 mL, IntraVENous, PRN  potassium chloride (KLOR-CON M) extended release tablet 40 mEq, 40 mEq, Oral, PRN **OR** potassium bicarb-citric acid (EFFER-K) effervescent tablet 40 mEq, 40 mEq, Oral, PRN **OR** potassium chloride 10 mEq/100 mL IVPB (Peripheral Line), 10 mEq, IntraVENous, PRN  senna (SENOKOT) tablet 8.6 mg, 1 tablet, Oral, Daily PRN  acetaminophen (TYLENOL) tablet 650 mg, 650 mg, Oral, Q6H PRN **OR** acetaminophen (TYLENOL) suppository 650 mg, 650 mg, Rectal, Q6H PRN  pantoprazole (PROTONIX) tablet 40 mg, 40 mg, Oral, Daily  sodium chloride flush 0.9 % injection 5-40 mL, 5-40 mL, IntraVENous, 2 times per day  sodium chloride flush 0.9 % injection 5-40 mL, 5-40 mL, IntraVENous, PRN  0.9 % sodium chloride infusion, 25 mL, IntraVENous, PRN  polyethylene glycol (GLYCOLAX) packet 17 g, 17 g, Oral, Daily PRN  thiamine tablet 100 mg, 100 mg, Oral, Daily  LORazepam (ATIVAN) tablet 1 mg, 1 mg, Oral, Q1H PRN **OR** LORazepam (ATIVAN) injection 1 mg, 1 mg, IntraVENous, Q1H PRN **OR** LORazepam (ATIVAN) tablet 2 mg, 2 mg, Oral, Q1H PRN **OR** LORazepam (ATIVAN) injection 2 mg, 2 mg, IntraVENous, Q1H PRN **OR** LORazepam (ATIVAN) tablet 3 mg, 3 mg, Oral, Q1H PRN **OR** LORazepam (ATIVAN) injection 3 mg, 3 mg, IntraVENous, Q1H PRN **OR** LORazepam (ATIVAN) tablet 4 mg, 4 mg, Oral, Q1H PRN **OR** LORazepam (ATIVAN) injection 4 mg, 4 mg, IntraVENous, Q1H PRN  0.9 % sodium chloride infusion, , IntraVENous, Continuous    Allergies:  Erythromycin and Penicillins    Social History:    Tobacco:  Pt reports she smokes cigarettes of < 1 ppd for 24 yrs. Alcohol:  Pt reports she drinks \"straight up tequila\" of about 1/5 in a 5-day week, \"much more on the weekends,\" states her last drink was 7 PM on Wednesday. Later states her  shares that 1/5 with her per week. Illicit Drugs: Pt denies. Family History: Mother living with diverticulosis, diverticulitis, and bowel resections due to diverticulitis. She has Cooper-Danlos disease. Father living, DM 1.  1 sister living with thyroid disease. 1 brother living with seizures. 1 child with diabetes mellitus type 1.  1 child living and healthy. REVIEW OF SYSTEMS:    Aside from what was mentioned in the PMH and HPI, essentially unremarkable, all others negative. PHYSICAL EXAM:      Vitals:    BP (!) 120/56   Pulse 95   Temp 98.2 °F (36.8 °C) (Oral)   Resp 18   Ht 5' 2\" (1.575 m)   Wt 145 lb 8 oz (66 kg)   LMP 10/02/2021   SpO2 95%   Breastfeeding No   BMI 26.61 kg/m²       CONSTITUTIONAL:  awake, alert, icteric, cooperative, sitting up in bed [facility]) style, restless-shifting position frequently, and appears stated age  EYES:  pupils equal, round and reactive to light, sclera icteric and conjunctiva normal  ENT:  normocephalic, oral pharynx with moist mucous membranes  NECK:  supple   LUNGS:  clear to auscultation bilaterally.   CARDIOVASCULAR:  regular rate and rhythm, no murmur noted; 2+ pulses; 1-2+ BLE edema  ABDOMEN:  normal bowel sounds, softly distended, non-tender, no masses or hepatosplenomegaly noted  MUSCULOSKELETAL:  full range of motion noted  motor strength is 5 out of 5 all extremities bilaterally  NEUROLOGIC:  Mental Status Exam:  Level of Alertness:   awake  Orientation:   person, place, time  Motor Exam:  Motor exam is symmetrical 5 out of 5 all extremities bilaterally  SKIN: Jaundiced skin color, normal texture, turgor    DATA:    CBC with Differential:    Lab Results   Component Value Date    WBC 7.3 10/15/2021    RBC 3.36 10/15/2021    HGB 12.3 10/15/2021    HCT 35.0 10/15/2021     10/15/2021    .2 10/15/2021    MCH 36.6 10/15/2021    MCHC 35.1 10/15/2021    RDW 15.0 10/15/2021    LYMPHOPCT 15.0 10/15/2021    MONOPCT 6.2 10/15/2021    BASOPCT 0.3 10/15/2021    MONOSABS 0.45 10/15/2021    LYMPHSABS 1.09 10/15/2021    EOSABS 0.08 10/15/2021    BASOSABS 0.02 10/15/2021     CMP:    Lab Results   Component Value Date     10/15/2021    K 3.2 10/15/2021    CL 97 10/15/2021    CO2 26 10/15/2021    BUN 3 10/15/2021    CREATININE 0.4 10/15/2021    GFRAA >60 10/15/2021    LABGLOM >60 10/15/2021    GLUCOSE 97 10/15/2021    PROT 5.1 10/15/2021    LABALBU 3.2 10/15/2021    CALCIUM 8.2 10/15/2021    BILITOT 12.3 10/15/2021    ALKPHOS 233 10/15/2021     10/15/2021    ALT 74 10/15/2021     Hepatic Function Panel:    Lab Results   Component Value Date    ALKPHOS 233 10/15/2021    ALT 74 10/15/2021     10/15/2021    PROT 5.1 10/15/2021    BILITOT 12.3 10/15/2021    BILIDIR 10.2 10/15/2021    LABALBU 3.2 10/15/2021     PT/INR:    Lab Results   Component Value Date    PROTIME 13.4 10/15/2021    INR 1.2 10/15/2021     PTT:    Lab Results   Component Value Date    APTT 33.1 10/14/2021   [APTT}  Last 3 Troponin:  No results found for: TROPONINI  TSH:    Lab Results   Component Value Date    TSH 4.060 08/25/2020         XR CHEST (2 VW)    Result Date: 10/14/2021  EXAMINATION: TWO XRAY VIEWS OF THE CHEST 10/14/2021 5:11 pm COMPARISON: None. HISTORY: ORDERING SYSTEM PROVIDED HISTORY: cardiac work-up TECHNOLOGIST PROVIDED HISTORY: Reason for exam:->cardiac work-up FINDINGS: The heart size is normal.  There are no infiltrates or effusions. Normal chest     CT ABDOMEN PELVIS W IV CONTRAST Additional Contrast? None    Result Date: 10/14/2021  EXAMINATION: CT OF THE ABDOMEN AND PELVIS WITH CONTRAST 10/14/2021 9:30 pm TECHNIQUE: CT of the abdomen and pelvis was performed with the administration of intravenous contrast. Multiplanar reformatted images are provided for review. Dose modulation, iterative reconstruction, and/or weight based adjustment of the mA/kV was utilized to reduce the radiation dose to as low as reasonably achievable. COMPARISON: Abdominopelvic CT angiogram from 08/25/2020. HISTORY: ORDERING SYSTEM PROVIDED HISTORY: jaundice concern for obstructive lesion TECHNOLOGIST PROVIDED HISTORY: Reason for exam:->jaundice concern for obstructive lesion Additional Contrast?->None Decision Support Exception - unselect if not a suspected or confirmed emergency medical condition->Emergency Medical Condition (MA) FINDINGS: Lower Chest: Visualized lung bases are clear. No acute abnormality. Organs: Severe diffuse hepatic steatosis. No visualized focal hepatic lesion. No significant abnormality is identified of the gallbladder, spleen, pancreas, adrenal glands or left kidney. 7 mm non-obstructing right nephrolith. No obstructive uropathy or pyelonephritis on either side. No intrahepatic nor extrahepatic biliary ductal dilatation. GI/Bowel: Normal appendix. No acute small or large bowel abnormality is identified. Scattered uncomplicated diverticula are noted of predominantly left hemicolon. Pelvis: Normal bladder. 1.6 cm benign appearing cystic focus is seen within the posterior aspect of the left ovary, consistent with incidental dominant follicle. No acute abnormality of the adnexae or anteverted uterus. Peritoneum/Retroperitoneum: No free intraperitoneal air or fluid. No acute retroperitoneal process. Bones/Soft Tissues: No acute soft tissue or osseous abnormality is identified within the field of view.      Severe diffuse hepatic steatosis, similar to prior study from 08/25/2020. No evidence of biliary obstruction. Please note that jaundice in the context of severe diffuse hepatic steatosis could represent steatohepatitis. 7 mm non-obstructing right nephrolith. US DUP LOWER EXTREMITIES BILATERAL VENOUS    Result Date: 10/14/2021  EXAMINATION: DUPLEX VENOUS ULTRASOUND OF THE BILATERAL LOWER RFYEWYNWTON85/14/2021 6:16 pm TECHNIQUE: Duplex ultrasound using B-mode/gray scaled imaging, Doppler spectral analysis and color flow Doppler was obtained of the deep venous structures of the lower bilateral extremities. COMPARISON: None used HISTORY: ORDERING SYSTEM PROVIDED HISTORY: dvt TECHNOLOGIST PROVIDED HISTORY: Reason for exam:->dvt What reading provider will be dictating this exam?->CRC FINDINGS: The visualized veins of the bilateral lower extremities are patent and free of echogenic thrombus. The veins demonstrate good compressibility with normal color flow study and spectral analysis. No evidence of DVT in either lower extremity. IMPRESSION:  · Acute hepatitis, alcoholic  · Jaundice  · Elevated LFTs  · Hepatic steatosis, likely secondary to alcohol, will r/o other etiology  · Hepatic fibrosis, F1  · Cooper-Danlos disease   · Hx GERD, gastritis, small hiatal hernia   · MELD 17.96; DI 16.9  · Unintentional weight loss, 60#/1yr    RECOMMENDATIONS:    · Monitor for DTs, CIWA as ordered per PCP  · Serology as ordered  · Protonix as ordered  · Continue IV fluids as ordered per PCP  · General diet  · Monitor CMP, CBC, INR daily  · Alcohol cessation  · Supportive care  · Continue to monitor    Note: This report was completed utilizing computer voice recognition software. Every effort has been made to ensure accuracy, however; inadvertent computerized transcription errors may be present. Thank you very much for your consultation. We will follow closely with you.     Discussed with Dr. Lakhwinder Mane developed by  Sarwat Jamesort [id number], FNP-BC 10/15/2021 12:34 PM for Dr. Amanda Scott. I HAD A FACE TO FACE ENCOUNTER WITH THE PATIENT. AGREE WITH THE EXAM, ASSESSMENT, AND PLAN AS OUTLINED ABOVE. ADDITION AND CORRECTIONS WERE DONE AS DEEMED APPROPRIATE. MY EXAM AND PLAN INCLUDE: DOING BETTER AFTER ATIVAN. A/O X 3. SHE HAS CALLED A REHAB CENTER AND WILL ATTEND UPON DISCHARGE. FROM GI POV OK FOR DISCHARGE. OUTPATIENT FOLLOW UP PLANS MADE WITH PATIENT.

## 2021-10-15 NOTE — H&P
History & Physicial  10/15/21  Primary Care:  Darren Hong  [address] [place] [address] Road. [address]  [place] [id number]      Chief Complaint   Patient presents with    Leg Swelling     bilaterally    Jaundice     eyes, hx of liver problems. Sent in by PCP       HPI:  Patient is a 42year old female who presented to [address] [place] [address] due to yellowing of eyes and skin. Patient drinks alcohol heavily and states she was looking into [facility] as an outpatient as she cannot miss work. They were working to get her set up with a therapist, psychiatrist and start and intensive outpatient program. She also called [facility] and was told that it would be 1-2 days for their inpatient program but states that she did not start this as she did not want to miss work. She states that the yellowing of her skin started last Friday with her eyes. She has also had swelling of her legs that had been increasing over the last couple of days. Patient denies any fevers, chills, chest pains, shortness of breath, nausea, vomiting or diarrhea. Her last drink was at 7 pm on Friday. Prior to Visit Medications    Medication Sig Taking? Authorizing Provider   Esomeprazole Magnesium (NEXIUM PO) Take by mouth Yes Historical Provider, MD     Social History     Tobacco Use    Smoking status: Current Every Day Smoker     Packs/day: 1.00    Smokeless tobacco: Never Used   Vaping Use    Vaping Use: Never used   Substance Use Topics    Alcohol use: Yes    Drug use: No     History reviewed. No pertinent family history. Past Surgical History:   Procedure Laterality Date    TONSILLECTOMY       Past Medical History:   Diagnosis Date    Cooper-Danlos disease      Review of Systems   Constitutional: Positive for fatigue. Negative for chills and fever. HENT: Negative for congestion, ear discharge, ear pain, hearing loss, mouth sores, nosebleeds and rhinorrhea. Eyes: Negative for photophobia and visual disturbance.    Respiratory: Negative for apnea, chest tightness, shortness of breath and wheezing. Cardiovascular: Negative for chest pain, palpitations and leg swelling. Gastrointestinal: Negative for abdominal distention, abdominal pain, blood in stool, constipation, diarrhea, nausea and vomiting. Endocrine: Negative for polyphagia and polyuria. Genitourinary: Positive for decreased urine volume. Negative for dysuria, flank pain, frequency, hematuria and urgency. Musculoskeletal: Negative for arthralgias, back pain, gait problem, myalgias, neck pain and neck stiffness. Skin: Positive for color change. Negative for pallor and rash. Allergic/Immunologic: Negative for food allergies and immunocompromised state. Neurological: Negative for dizziness, light-headedness and numbness. Hematological: Negative for adenopathy. Does not bruise/bleed easily. Psychiatric/Behavioral: Negative for agitation and confusion. Vitals:    10/15/21 0143   BP: 117/72   Pulse: 112   Resp: 18   Temp: 98.5 °F (36.9 °C)   SpO2: 95%       Physical Exam  Constitutional:       Appearance: She is ill-appearing. HENT:      Head: Normocephalic and atraumatic. Right Ear: Tympanic membrane and external ear normal.      Left Ear: Tympanic membrane and external ear normal.      Nose: Nose normal. No congestion. Mouth/Throat:      Mouth: Mucous membranes are moist.      Pharynx: Oropharynx is clear. No oropharyngeal exudate. Eyes:      General:         Right eye: No discharge. Left eye: No discharge. Extraocular Movements: Extraocular movements intact. Pupils: Pupils are equal, round, and reactive to light. Cardiovascular:      Rate and Rhythm: Normal rate and regular rhythm. Pulses: Normal pulses. Heart sounds: No murmur heard. No friction rub. No gallop. Pulmonary:      Effort: Pulmonary effort is normal. No respiratory distress. Breath sounds: Normal breath sounds. No wheezing, rhonchi or rales.    Abdominal: General: Bowel sounds are normal. There is no distension. Palpations: Abdomen is soft. Tenderness: There is no abdominal tenderness. There is no guarding or rebound. Musculoskeletal:         General: Normal range of motion. Cervical back: Normal range of motion and neck supple. Right lower leg: No edema. Left lower leg: No edema. Skin:     General: Skin is warm and dry. Capillary Refill: Capillary refill takes less than 2 seconds. Coloration: Skin is jaundiced. Neurological:      General: No focal deficit present. Mental Status: She is alert and oriented to person, place, and time. Psychiatric:         Mood and Affect: Mood normal.         Behavior: Behavior normal.         Active Problems:    Acute liver failure without hepatic coma  Resolved Problems:    * No resolved hospital problems. *  1. Painless Jaundice  2. Elevated LFTs  3. Alcohol Abuse  4. Hypokalemia   5. Hypomagnesemia  6. Anion gap metabolic acidosis     Plan:  Admitted to inpatient. Continue on IVF. GI consulted. Reviewed imaging. Continue on CIWA scale.  for rehab set up. Replace electrolytes.      DVT Prophylaxis: SCDs   Code Status: Full     Salvador Sandhu DO      Electronically signed by Salvador Sandhu DO on 10/15/2021 at 6:43 AM

## 2021-10-15 NOTE — ED NOTES
MEWS score of 3 discussed with Dr. Jeff Bowman,  per Dr Jeff Bowman, ok to transport to inpatient room.      Yoanna Valdez RN  10/15/21 0111

## 2021-10-16 VITALS
OXYGEN SATURATION: 98 % | RESPIRATION RATE: 18 BRPM | DIASTOLIC BLOOD PRESSURE: 92 MMHG | BODY MASS INDEX: 26.78 KG/M2 | TEMPERATURE: 98.4 F | HEIGHT: 62 IN | WEIGHT: 145.5 LBS | HEART RATE: 96 BPM | SYSTOLIC BLOOD PRESSURE: 123 MMHG

## 2021-10-16 LAB
ALBUMIN SERPL-MCNC: 2.8 G/DL (ref 3.5–5.2)
ALP BLD-CCNC: 215 U/L (ref 35–104)
ALT SERPL-CCNC: 66 U/L (ref 0–32)
AMMONIA: 53 UMOL/L (ref 11–51)
ANION GAP SERPL CALCULATED.3IONS-SCNC: 13 MMOL/L (ref 7–16)
AST SERPL-CCNC: 122 U/L (ref 0–31)
BASOPHILS ABSOLUTE: 0.03 E9/L (ref 0–0.2)
BASOPHILS RELATIVE PERCENT: 0.5 % (ref 0–2)
BILIRUB SERPL-MCNC: 11 MG/DL (ref 0–1.2)
BILIRUBIN DIRECT: 8.6 MG/DL (ref 0–0.3)
BILIRUBIN, INDIRECT: 2.4 MG/DL (ref 0–1)
BUN BLDV-MCNC: 2 MG/DL (ref 6–20)
CALCIUM SERPL-MCNC: 8 MG/DL (ref 8.6–10.2)
CHLORIDE BLD-SCNC: 101 MMOL/L (ref 98–107)
CO2: 22 MMOL/L (ref 22–29)
CREAT SERPL-MCNC: 0.3 MG/DL (ref 0.5–1)
EOSINOPHILS ABSOLUTE: 0.1 E9/L (ref 0.05–0.5)
EOSINOPHILS RELATIVE PERCENT: 1.5 % (ref 0–6)
GFR AFRICAN AMERICAN: >60
GFR NON-AFRICAN AMERICAN: >60 ML/MIN/1.73
GLUCOSE BLD-MCNC: 87 MG/DL (ref 74–99)
HCT VFR BLD CALC: 32.7 % (ref 34–48)
HEMOGLOBIN: 11.3 G/DL (ref 11.5–15.5)
IGG: 718 MG/DL (ref 700–1600)
IGM: 73 MG/DL (ref 40–230)
IMMATURE GRANULOCYTES #: 0.04 E9/L
IMMATURE GRANULOCYTES %: 0.6 % (ref 0–5)
INR BLD: 1.3
LYMPHOCYTES ABSOLUTE: 1 E9/L (ref 1.5–4)
LYMPHOCYTES RELATIVE PERCENT: 15.3 % (ref 20–42)
MCH RBC QN AUTO: 36.8 PG (ref 26–35)
MCHC RBC AUTO-ENTMCNC: 34.6 % (ref 32–34.5)
MCV RBC AUTO: 106.5 FL (ref 80–99.9)
MONOCYTES ABSOLUTE: 0.46 E9/L (ref 0.1–0.95)
MONOCYTES RELATIVE PERCENT: 7.1 % (ref 2–12)
NEUTROPHILS ABSOLUTE: 4.89 E9/L (ref 1.8–7.3)
NEUTROPHILS RELATIVE PERCENT: 75 % (ref 43–80)
PDW BLD-RTO: 15.1 FL (ref 11.5–15)
PLATELET # BLD: 163 E9/L (ref 130–450)
PMV BLD AUTO: 12 FL (ref 7–12)
POTASSIUM REFLEX MAGNESIUM: 3.8 MMOL/L (ref 3.5–5)
PROTHROMBIN TIME: 14.3 SEC (ref 9.3–12.4)
RBC # BLD: 3.07 E12/L (ref 3.5–5.5)
SODIUM BLD-SCNC: 136 MMOL/L (ref 132–146)
TOTAL PROTEIN: 4.7 G/DL (ref 6.4–8.3)
WBC # BLD: 6.5 E9/L (ref 4.5–11.5)

## 2021-10-16 PROCEDURE — G0378 HOSPITAL OBSERVATION PER HR: HCPCS

## 2021-10-16 PROCEDURE — 80053 COMPREHEN METABOLIC PANEL: CPT

## 2021-10-16 PROCEDURE — 80076 HEPATIC FUNCTION PANEL: CPT

## 2021-10-16 PROCEDURE — 6370000000 HC RX 637 (ALT 250 FOR IP): Performed by: INTERNAL MEDICINE

## 2021-10-16 PROCEDURE — 2580000003 HC RX 258: Performed by: INTERNAL MEDICINE

## 2021-10-16 PROCEDURE — 85025 COMPLETE CBC W/AUTO DIFF WBC: CPT

## 2021-10-16 PROCEDURE — 36415 COLL VENOUS BLD VENIPUNCTURE: CPT

## 2021-10-16 PROCEDURE — 96361 HYDRATE IV INFUSION ADD-ON: CPT

## 2021-10-16 PROCEDURE — 85610 PROTHROMBIN TIME: CPT

## 2021-10-16 PROCEDURE — 82140 ASSAY OF AMMONIA: CPT

## 2021-10-16 RX ORDER — LACTULOSE 10 G/15ML
10 SOLUTION ORAL EVERY EVENING
Qty: 120 ML | Refills: 0 | Status: ON HOLD | OUTPATIENT
Start: 2021-10-16 | End: 2021-10-23 | Stop reason: HOSPADM

## 2021-10-16 RX ORDER — LANOLIN ALCOHOL/MO/W.PET/CERES
100 CREAM (GRAM) TOPICAL DAILY
Qty: 30 TABLET | Refills: 3 | Status: SHIPPED | OUTPATIENT
Start: 2021-10-17 | End: 2022-04-08 | Stop reason: ALTCHOICE

## 2021-10-16 RX ADMIN — PANTOPRAZOLE SODIUM 40 MG: 40 TABLET, DELAYED RELEASE ORAL at 09:26

## 2021-10-16 RX ADMIN — Medication 100 MG: at 09:26

## 2021-10-16 RX ADMIN — SODIUM CHLORIDE: 9 INJECTION, SOLUTION INTRAVENOUS at 13:00

## 2021-10-16 NOTE — PROGRESS NOTES
Subjective: The patient is awake and alert. No problems overnight. Denies chest pain, angina, and dyspnea. Denies abdominal pain. Tolerating diet. No nausea or vomiting. No signs of alcohol withdrawal-- last drink was 7 pm on 10/13/21. She is very anxious to go home,. Objective:    /72   Pulse 98   Temp 98.4 °F (36.9 °C) (Oral)   Resp 18   Ht 5' 2\" (1.575 m)   Wt 145 lb 8 oz (66 kg)   LMP 10/02/2021   SpO2 98%   Breastfeeding No   BMI 26.61 kg/m²   Neck: No goiter, bruit, or LA  Heart:  RRR, no murmurs, gallops, or rubs.   Lungs:  CTA bilaterally, no wheeze, rales or rhonchi  Abd: bowel sounds present, nontender, nondistended, no masses  Extrem:  No clubbing, cyanosis, or edema, 2+ peripheral pulses, FROM    CBC:   Lab Results   Component Value Date    WBC 6.5 10/16/2021    RBC 3.07 10/16/2021    HGB 11.3 10/16/2021    HCT 32.7 10/16/2021    .5 10/16/2021    MCH 36.8 10/16/2021    MCHC 34.6 10/16/2021    RDW 15.1 10/16/2021     10/16/2021    MPV 12.0 10/16/2021     CMP:    Lab Results   Component Value Date     10/16/2021    K 3.8 10/16/2021     10/16/2021    CO2 22 10/16/2021    BUN 2 10/16/2021    CREATININE 0.3 10/16/2021    GFRAA >60 10/16/2021    LABGLOM >60 10/16/2021    GLUCOSE 87 10/16/2021    PROT 4.7 10/16/2021    LABALBU 2.8 10/16/2021    CALCIUM 8.0 10/16/2021    BILITOT 11.0 10/16/2021    ALKPHOS 215 10/16/2021     10/16/2021    ALT 66 10/16/2021          Current Facility-Administered Medications:     influenza quadrivalent split vaccine (FLUZONE;FLUARIX;FLULAVAL;AFLURIA) injection 0.5 mL, 0.5 mL, IntraMUSCular, Prior to discharge, Verónica Monsivais, DO    sodium chloride flush 0.9 % injection 10 mL, 10 mL, IntraVENous, 2 times per day, Verónica Hanseniter, DO    sodium chloride flush 0.9 % injection 10 mL, 10 mL, IntraVENous, PRN, Verónica Monsivais, DO    0.9 % sodium chloride infusion, 25 mL, IntraVENous, PRN, Verónica Monsivais, DO    potassium chloride (KLOR-CON M) extended release tablet 40 mEq, 40 mEq, Oral, PRN, 40 mEq at 10/15/21 1208 **OR** potassium bicarb-citric acid (EFFER-K) effervescent tablet 40 mEq, 40 mEq, Oral, PRN **OR** potassium chloride 10 mEq/100 mL IVPB (Peripheral Line), 10 mEq, IntraVENous, PRN, Verónica E Yodit, DO    senna (SENOKOT) tablet 8.6 mg, 1 tablet, Oral, Daily PRN, Verónica E Yodit, DO    acetaminophen (TYLENOL) tablet 650 mg, 650 mg, Oral, Q6H PRN **OR** acetaminophen (TYLENOL) suppository 650 mg, 650 mg, Rectal, Q6H PRN, Verónica E Yodit, DO    pantoprazole (PROTONIX) tablet 40 mg, 40 mg, Oral, Daily, Verónica E Yodit, DO, 40 mg at 10/15/21 0844    sodium chloride flush 0.9 % injection 5-40 mL, 5-40 mL, IntraVENous, 2 times per day, Verónica E Yodit, DO    sodium chloride flush 0.9 % injection 5-40 mL, 5-40 mL, IntraVENous, PRN, Verónica E Yodit, DO    0.9 % sodium chloride infusion, 25 mL, IntraVENous, PRN, Verónica E Yodit, DO    polyethylene glycol (GLYCOLAX) packet 17 g, 17 g, Oral, Daily PRN, Verónica E Yodit, DO    thiamine tablet 100 mg, 100 mg, Oral, Daily, Verónica E Yodit, DO, 100 mg at 10/15/21 0844    LORazepam (ATIVAN) tablet 1 mg, 1 mg, Oral, Q1H PRN **OR** LORazepam (ATIVAN) injection 1 mg, 1 mg, IntraVENous, Q1H PRN **OR** LORazepam (ATIVAN) tablet 2 mg, 2 mg, Oral, Q1H PRN **OR** LORazepam (ATIVAN) injection 2 mg, 2 mg, IntraVENous, Q1H PRN **OR** LORazepam (ATIVAN) tablet 3 mg, 3 mg, Oral, Q1H PRN **OR** LORazepam (ATIVAN) injection 3 mg, 3 mg, IntraVENous, Q1H PRN **OR** LORazepam (ATIVAN) tablet 4 mg, 4 mg, Oral, Q1H PRN **OR** LORazepam (ATIVAN) injection 4 mg, 4 mg, IntraVENous, Q1H PRN, Verónica Monsivais DO    0.9 % sodium chloride infusion, , IntraVENous, Continuous, Verónica Monsivais DO, Last Rate: 100 mL/hr at 10/15/21 1445, New Bag at 10/15/21 1445    Assessment:    Patient Active Problem List   Diagnosis    Acute liver failure without hepatic coma       Plan:  1.  Acute alcoholic hepatitis- supportive treatment, GI on case. 2. Hypokalemia- corrected  3.  Chronic anemia- stable          Claudia Castro MD  8:29 AM  10/16/2021

## 2021-10-16 NOTE — PROGRESS NOTES
PROGRESS NOTE    Patient Presents with/Seen in Consultation For      *Acute Liver failure, Alcohol abuse  CHIEF COMPLAINT:  Abdominal pain, decreased appetite, and jaundice    Subjective:     Patient sitting up at the side of the bed, eating breakfast with family at bedside. States she is feeling good today. Tolerating diet, denies abdominal pain, nausea or vomiting. Bowel movement this morning described as softly formed and brown. Review of Systems  Aside from what was mentioned in the PMH and HPI, essentially unremarkable, all others negative. Objective:     /72   Pulse 98   Temp 98.4 °F (36.9 °C) (Oral)   Resp 18   Ht 5' 2\" (1.575 m)   Wt 145 lb 8 oz (66 kg)   LMP 10/02/2021   SpO2 98%   Breastfeeding No   BMI 26.61 kg/m²     General appearance: alert, awake, sitting up at side of the bed with family at bediside, and cooperative  Eyes: conjunctiva normal, sclera anicteric. PERRL.   Lungs: clear to auscultation bilaterally  Heart: regular rate and rhythm, no murmur, 2+ pulses; no edema  Abdomen: soft, non-tender; bowel sounds normal; no masses,  no organomegaly  Extremities: extremities without edema  Pulses: 2+ and symmetric  Skin: Skin color, texture, turgor normal.   Neurologic: Grossly normal    influenza quadrivalent split vaccine (FLUZONE;FLUARIX;FLULAVAL;AFLURIA) injection 0.5 mL, Prior to discharge  sodium chloride flush 0.9 % injection 10 mL, 2 times per day  sodium chloride flush 0.9 % injection 10 mL, PRN  0.9 % sodium chloride infusion, PRN  potassium chloride (KLOR-CON M) extended release tablet 40 mEq, PRN   Or  potassium bicarb-citric acid (EFFER-K) effervescent tablet 40 mEq, PRN   Or  potassium chloride 10 mEq/100 mL IVPB (Peripheral Line), PRN  senna (SENOKOT) tablet 8.6 mg, Daily PRN  acetaminophen (TYLENOL) tablet 650 mg, Q6H PRN   Or  acetaminophen (TYLENOL) suppository 650 mg, Q6H PRN  pantoprazole (PROTONIX) tablet 40 mg, Daily  sodium chloride flush 0.9 % injection 5-40 mL, 2 times per day  sodium chloride flush 0.9 % injection 5-40 mL, PRN  0.9 % sodium chloride infusion, PRN  polyethylene glycol (GLYCOLAX) packet 17 g, Daily PRN  thiamine tablet 100 mg, Daily  LORazepam (ATIVAN) tablet 1 mg, Q1H PRN   Or  LORazepam (ATIVAN) injection 1 mg, Q1H PRN   Or  LORazepam (ATIVAN) tablet 2 mg, Q1H PRN   Or  LORazepam (ATIVAN) injection 2 mg, Q1H PRN   Or  LORazepam (ATIVAN) tablet 3 mg, Q1H PRN   Or  LORazepam (ATIVAN) injection 3 mg, Q1H PRN   Or  LORazepam (ATIVAN) tablet 4 mg, Q1H PRN   Or  LORazepam (ATIVAN) injection 4 mg, Q1H PRN  0.9 % sodium chloride infusion, Continuous         Data Review  CBC:   Lab Results   Component Value Date    WBC 6.5 10/16/2021    RBC 3.07 10/16/2021    HGB 11.3 10/16/2021    HCT 32.7 10/16/2021    .5 10/16/2021    MCH 36.8 10/16/2021    MCHC 34.6 10/16/2021    RDW 15.1 10/16/2021     10/16/2021    MPV 12.0 10/16/2021     CMP:    Lab Results   Component Value Date     10/16/2021    K 3.8 10/16/2021     10/16/2021    CO2 22 10/16/2021    BUN 2 10/16/2021    CREATININE 0.3 10/16/2021    GFRAA >60 10/16/2021    LABGLOM >60 10/16/2021    GLUCOSE 87 10/16/2021    PROT 4.7 10/16/2021    LABALBU 2.8 10/16/2021    CALCIUM 8.0 10/16/2021    BILITOT 11.0 10/16/2021    ALKPHOS 215 10/16/2021     10/16/2021    ALT 66 10/16/2021     Hepatic Function Panel:    Lab Results   Component Value Date    ALKPHOS 215 10/16/2021    ALT 66 10/16/2021     10/16/2021    PROT 4.7 10/16/2021    BILITOT 11.0 10/16/2021    BILIDIR 8.6 10/16/2021    IBILI 2.4 10/16/2021    LABALBU 2.8 10/16/2021       Lab Results   Component Value Date    PROTIME 14.3 10/16/2021    INR 1.3 10/16/2021     IRON:    Lab Results   Component Value Date    IRON 123 10/15/2021     Iron Saturation:  No components found for: PERCENTFE  FERRITIN:    Lab Results   Component Value Date    FERRITIN 935 10/15/2021         Assessment:   Active Problems:  ? Acute hepatitis, alcoholic  ? Jaundice  ? Elevated LFTs  ? Hepatic steatosis, likely secondary to alcohol, will r/o other etiology  ? Hepatic fibrosis, F1  ? Cooper-Danlos disease   ? Hx GERD, gastritis, small hiatal hernia   ? MELD 17.96; DI 16.9  ? Unintentional weight loss, 60#/1yr    Plan:   ? Continue Protonix as ordered  ? Alcohol cessation  ? Discharge per PCP, ok from GI POV with outpatient follow up per discharge instructions. Note: This report was completed utilizing computer voice recognition software. Every effort has been made to ensure accuracy, however; inadvertent computerized transcription errors may be present.      Discussed with Dr. Domenica Vora per Dr. Jose Carlos Hagan, APRN-NP-C 10/16/2021  8:58 AM For Dr. Alejandro Shrestha

## 2021-10-18 LAB
AFP-TUMOR MARKER: 5 NG/ML (ref 0–9)
ALPHA-1 ANTITRYPSIN: 155 MG/DL (ref 90–200)
ANTI-MITOCHONDRIAL AB, IFA: NEGATIVE
ANTI-NUCLEAR ANTIBODY (ANA): NEGATIVE
HAV IGM SER IA-ACNC: NORMAL
HAV IGM SER IA-ACNC: NORMAL
HEPATITIS B CORE IGM ANTIBODY: NORMAL
HEPATITIS B CORE IGM ANTIBODY: NORMAL
HEPATITIS B SURFACE ANTIGEN INTERPRETATION: NORMAL
HEPATITIS B SURFACE ANTIGEN INTERPRETATION: NORMAL
HEPATITIS C ANTIBODY INTERPRETATION: NORMAL
HEPATITIS C ANTIBODY INTERPRETATION: NORMAL
SMOOTH MUSCLE ANTIBODY: NEGATIVE

## 2021-10-18 NOTE — DISCHARGE SUMMARY
Discharge Summary     Patient ID:  Mary Ann Leslie  [id number]  50 y.o. 1980 female  Mihir Davila DO        Admit date: 10/14/2021    Discharge date and time:  10/16/2021 at 340 pm     Activity level: As tolerated   Diet:Low fat  Labs:CBC, CMP   Disposition:Home   Condition on Discharge: Stable  DME:None     Admit Diagnoses:   Patient Active Problem List   Diagnosis    Acute liver failure without hepatic coma       Discharge Diagnoses: Active Problems:    Acute liver failure without hepatic coma  Resolved Problems:    * No resolved hospital problems. *  Acute Alcoholic Hepatitis    Consults:  IP CONSULT TO INTERNAL MEDICINE  IP CONSULT TO GI  IP CONSULT TO SOCIAL WORK    Procedures: None     Hospital Course: Patient is a 42year old female who presented to [address] [place] [address] due to yellowing of eyes and skin. Patient drinks alcohol heavily and states she was looking into [employer] as an outpatient as she cannot miss work. They were working to get her set up with a therapist, psychiatrist and start and intensive outpatient program. She also [facility] and was told that it would be 1-2 days for their inpatient program but states that she did not start this as she did not want to miss work. She states that the yellowing of her skin started last Friday with her eyes. She has also had swelling of her legs that had been increasing over the last couple of days. Patient denies any fevers, chills, chest pains, shortness of breath, nausea, vomiting or diarrhea. Her last drink was at 7 pm on Wednesday. Patient was seen by GI. She was started on IVF. Bilirubin down to 11 the next day. She will need to refrain from alcohol completely. Patient was discharged in stable condition. She will need to follow up with PCP and GI as well as establish with rehab for alcohol counseling.      Discharge Exam:  /72   Pulse 98   Temp 98.4 °F (36.9 °C) (Oral)   Resp 18   Ht 5' 2\" (1.575 m)   Wt 145 lb 8 oz (66 kg)   LMP 10/02/2021   SpO2 98%   Breastfeeding No   BMI 26.61 kg/m²   Neck: No goiter, bruit, or LA  Heart:  RRR, no murmurs, gallops, or rubs. Lungs:  CTA bilaterally, no wheeze, rales or rhonchi  Abd: bowel sounds present, nontender, nondistended, no masses  Extrem:  No clubbing, cyanosis, or edema, 2+ peripheral pulses, FROM  I/O last 3 completed shifts: In: 150 [I.V.:150]  Out: -   I/O this shift:  In: 150 [I.V.:150]  Out: -       LABS:  Recent Labs     10/15/21  0955 10/16/21  0342    136   K 3.2* 3.8   CL 97* 101   CO2 26 22   BUN 3* 2*   CREATININE 0.4* 0.3*   GLUCOSE 97 87   CALCIUM 8.2* 8.0*       Recent Labs     10/15/21  0955 10/16/21  0342   WBC 7.3 6.5   RBC 3.36* 3.07*   HGB 12.3 11.3*   HCT 35.0 32.7*   .2* 106.5*   MCH 36.6* 36.8*   MCHC 35.1* 34.6*   RDW 15.0 15.1*    163   MPV 11.7 12.0       No results for input(s): GLUMET in the last 72 hours. Imaging:  XR CHEST (2 VW)    Result Date: 10/14/2021  EXAMINATION: TWO XRAY VIEWS OF THE CHEST 10/14/2021 5:11 pm COMPARISON: None. HISTORY: ORDERING SYSTEM PROVIDED HISTORY: cardiac work-up TECHNOLOGIST PROVIDED HISTORY: Reason for exam:->cardiac work-up FINDINGS: The heart size is normal.  There are no infiltrates or effusions. Normal chest     CT ABDOMEN PELVIS W IV CONTRAST Additional Contrast? None    Result Date: 10/14/2021  EXAMINATION: CT OF THE ABDOMEN AND PELVIS WITH CONTRAST 10/14/2021 9:30 pm TECHNIQUE: CT of the abdomen and pelvis was performed with the administration of intravenous contrast. Multiplanar reformatted images are provided for review. Dose modulation, iterative reconstruction, and/or weight based adjustment of the mA/kV was utilized to reduce the radiation dose to as low as reasonably achievable. COMPARISON: Abdominopelvic CT angiogram from 08/25/2020.  HISTORY: ORDERING SYSTEM PROVIDED HISTORY: jaundice concern for obstructive lesion TECHNOLOGIST PROVIDED HISTORY: Reason for exam:->jaundice concern for obstructive lesion Additional Contrast?->None Decision Support Exception - unselect if not a suspected or confirmed emergency medical condition->Emergency Medical Condition (MA) FINDINGS: Lower Chest: Visualized lung bases are clear. No acute abnormality. Organs: Severe diffuse hepatic steatosis. No visualized focal hepatic lesion. No significant abnormality is identified of the gallbladder, spleen, pancreas, adrenal glands or left kidney. 7 mm non-obstructing right nephrolith. No obstructive uropathy or pyelonephritis on either side. No intrahepatic nor extrahepatic biliary ductal dilatation. GI/Bowel: Normal appendix. No acute small or large bowel abnormality is identified. Scattered uncomplicated diverticula are noted of predominantly left hemicolon. Pelvis: Normal bladder. 1.6 cm benign appearing cystic focus is seen within the posterior aspect of the left ovary, consistent with incidental dominant follicle. No acute abnormality of the adnexae or anteverted uterus. Peritoneum/Retroperitoneum: No free intraperitoneal air or fluid. No acute retroperitoneal process. Bones/Soft Tissues: No acute soft tissue or osseous abnormality is identified within the field of view. Severe diffuse hepatic steatosis, similar to prior study from 08/25/2020. No evidence of biliary obstruction. Please note that jaundice in the context of severe diffuse hepatic steatosis could represent steatohepatitis. 7 mm non-obstructing right nephrolith. US DUP LOWER EXTREMITIES BILATERAL VENOUS    Result Date: 10/14/2021  EXAMINATION: DUPLEX VENOUS ULTRASOUND OF THE BILATERAL LOWER [id number]/14/2021 6:16 pm TECHNIQUE: Duplex ultrasound using B-mode/gray scaled imaging, Doppler spectral analysis and color flow Doppler was obtained of the deep venous structures of the lower bilateral extremities.  COMPARISON: None used HISTORY: ORDERING SYSTEM PROVIDED HISTORY: dvt TECHNOLOGIST PROVIDED HISTORY: Reason for exam:->dvt What reading provider will be dictating this exam?->CRC FINDINGS: The visualized veins of the bilateral lower extremities are patent and free of echogenic thrombus. The veins demonstrate good compressibility with normal color flow study and spectral analysis. No evidence of DVT in either lower extremity.        Patient Instructions:   Discharge Medication List as of 10/16/2021  2:29 PM      START taking these medications    Details   thiamine 100 MG tablet Take 1 tablet by mouth daily, Disp-30 tablet, R-3Normal      lactulose (CHRONULAC) 10 GM/15ML solution Take 15 mLs by mouth every evening, Disp-120 mL, R-0Normal         CONTINUE these medications which have NOT CHANGED    Details   Esomeprazole Magnesium (NEXIUM PO) Take by mouthHistorical Med         STOP taking these medications       ondansetron (ZOFRAN ODT) 4 MG disintegrating tablet Comments:   Reason for Stopping:                 Note that more than 30 minutes was spent in preparing discharge papers, discussing discharge with patient, medication review, etc.      Signed:    Jadiel Rosales DO    Electronically signed by Jadiel Rosales DO on 10/18/2021 at 6:54 AM

## 2021-10-18 NOTE — PROGRESS NOTES
Patient released from hospital. Spoke to patient at 9:41 am on 10/18 and she will have her pharmacy call our pharmacy for a transfer

## 2021-10-19 LAB
IGG 1: 422 MG/DL (ref 240–1118)
IGG 2: 223 MG/DL (ref 124–549)
IGG 3: 41 MG/DL (ref 21–134)
IGG 4: 9 MG/DL (ref 1–123)

## 2021-10-20 ENCOUNTER — HOSPITAL ENCOUNTER (INPATIENT)
Age: 41
LOS: 1 days | Discharge: HOME | DRG: 433 | End: 2021-10-23
Attending: EMERGENCY MEDICINE | Admitting: INTERNAL MEDICINE
Payer: COMMERCIAL

## 2021-10-20 ENCOUNTER — APPOINTMENT (OUTPATIENT)
Dept: CT IMAGING | Age: 41
DRG: 433 | End: 2021-10-20
Payer: COMMERCIAL

## 2021-10-20 ENCOUNTER — APPOINTMENT (OUTPATIENT)
Dept: ULTRASOUND IMAGING | Age: 41
DRG: 433 | End: 2021-10-20
Payer: COMMERCIAL

## 2021-10-20 DIAGNOSIS — K72.00 ACUTE LIVER FAILURE WITHOUT HEPATIC COMA: Primary | ICD-10-CM

## 2021-10-20 DIAGNOSIS — E80.6 HYPERBILIRUBINEMIA: ICD-10-CM

## 2021-10-20 DIAGNOSIS — F10.21 HISTORY OF ALCOHOLISM (HCC): ICD-10-CM

## 2021-10-20 DIAGNOSIS — R17 JAUNDICE: ICD-10-CM

## 2021-10-20 LAB
ALBUMIN SERPL-MCNC: 3.3 G/DL (ref 3.5–5.2)
ALP BLD-CCNC: 274 U/L (ref 35–104)
ALT SERPL-CCNC: 47 U/L (ref 0–32)
AMMONIA: 51.8 UMOL/L (ref 11–51)
ANION GAP SERPL CALCULATED.3IONS-SCNC: 16 MMOL/L (ref 7–16)
APTT: 30.3 SEC (ref 24.5–35.1)
AST SERPL-CCNC: 89 U/L (ref 0–31)
BACTERIA: ABNORMAL /HPF
BASOPHILS ABSOLUTE: 0.05 E9/L (ref 0–0.2)
BASOPHILS RELATIVE PERCENT: 0.5 % (ref 0–2)
BILIRUB SERPL-MCNC: 21.1 MG/DL (ref 0–1.2)
BILIRUBIN URINE: ABNORMAL
BLOOD, URINE: NEGATIVE
BUN BLDV-MCNC: 4 MG/DL (ref 6–20)
CALCIUM SERPL-MCNC: 9.1 MG/DL (ref 8.6–10.2)
CHLORIDE BLD-SCNC: 92 MMOL/L (ref 98–107)
CLARITY: ABNORMAL
CO2: 25 MMOL/L (ref 22–29)
COLOR: ABNORMAL
CREAT SERPL-MCNC: 0.4 MG/DL (ref 0.5–1)
EOSINOPHILS ABSOLUTE: 0.08 E9/L (ref 0.05–0.5)
EOSINOPHILS RELATIVE PERCENT: 0.9 % (ref 0–6)
EPITHELIAL CELLS, UA: ABNORMAL /HPF
GFR AFRICAN AMERICAN: >60
GFR NON-AFRICAN AMERICAN: >60 ML/MIN/1.73
GLUCOSE BLD-MCNC: 121 MG/DL (ref 74–99)
GLUCOSE URINE: 250 MG/DL
HCG, URINE, POC: NEGATIVE
HCT VFR BLD CALC: 41.8 % (ref 34–48)
HEMOGLOBIN: 14.4 G/DL (ref 11.5–15.5)
IMMATURE GRANULOCYTES #: 0.11 E9/L
IMMATURE GRANULOCYTES %: 1.2 % (ref 0–5)
INR BLD: 1.2
KETONES, URINE: 40 MG/DL
LACTIC ACID: 2.8 MMOL/L (ref 0.5–2.2)
LEUKOCYTE ESTERASE, URINE: ABNORMAL
LYMPHOCYTES ABSOLUTE: 0.96 E9/L (ref 1.5–4)
LYMPHOCYTES RELATIVE PERCENT: 10.2 % (ref 20–42)
Lab: NORMAL
MAGNESIUM: 1.8 MG/DL (ref 1.6–2.6)
MCH RBC QN AUTO: 36.1 PG (ref 26–35)
MCHC RBC AUTO-ENTMCNC: 34.4 % (ref 32–34.5)
MCV RBC AUTO: 104.8 FL (ref 80–99.9)
MONOCYTES ABSOLUTE: 1.29 E9/L (ref 0.1–0.95)
MONOCYTES RELATIVE PERCENT: 13.7 % (ref 2–12)
NEGATIVE QC PASS/FAIL: NORMAL
NEUTROPHILS ABSOLUTE: 6.9 E9/L (ref 1.8–7.3)
NEUTROPHILS RELATIVE PERCENT: 73.5 % (ref 43–80)
NITRITE, URINE: POSITIVE
PDW BLD-RTO: 15.4 FL (ref 11.5–15)
PH UA: 6.5 (ref 5–9)
PLATELET # BLD: 286 E9/L (ref 130–450)
PMV BLD AUTO: 10.2 FL (ref 7–12)
POSITIVE QC PASS/FAIL: NORMAL
POTASSIUM REFLEX MAGNESIUM: 3 MMOL/L (ref 3.5–5)
PROTEIN UA: 100 MG/DL
PROTHROMBIN TIME: 13.6 SEC (ref 9.3–12.4)
RBC # BLD: 3.99 E12/L (ref 3.5–5.5)
RBC UA: ABNORMAL /HPF (ref 0–2)
SODIUM BLD-SCNC: 133 MMOL/L (ref 132–146)
SPECIFIC GRAVITY UA: 1.02 (ref 1–1.03)
TOTAL PROTEIN: 5.6 G/DL (ref 6.4–8.3)
UROBILINOGEN, URINE: 2 E.U./DL
WBC # BLD: 9.4 E9/L (ref 4.5–11.5)
WBC UA: ABNORMAL /HPF (ref 0–5)

## 2021-10-20 PROCEDURE — 6360000004 HC RX CONTRAST MEDICATION: Performed by: RADIOLOGY

## 2021-10-20 PROCEDURE — 96365 THER/PROPH/DIAG IV INF INIT: CPT

## 2021-10-20 PROCEDURE — 99283 EMERGENCY DEPT VISIT LOW MDM: CPT

## 2021-10-20 PROCEDURE — 80053 COMPREHEN METABOLIC PANEL: CPT

## 2021-10-20 PROCEDURE — 85610 PROTHROMBIN TIME: CPT

## 2021-10-20 PROCEDURE — 83735 ASSAY OF MAGNESIUM: CPT

## 2021-10-20 PROCEDURE — 2580000003 HC RX 258: Performed by: EMERGENCY MEDICINE

## 2021-10-20 PROCEDURE — 36415 COLL VENOUS BLD VENIPUNCTURE: CPT

## 2021-10-20 PROCEDURE — 85730 THROMBOPLASTIN TIME PARTIAL: CPT

## 2021-10-20 PROCEDURE — 96368 THER/DIAG CONCURRENT INF: CPT

## 2021-10-20 PROCEDURE — 96367 TX/PROPH/DG ADDL SEQ IV INF: CPT

## 2021-10-20 PROCEDURE — 87186 SC STD MICRODIL/AGAR DIL: CPT

## 2021-10-20 PROCEDURE — 83605 ASSAY OF LACTIC ACID: CPT

## 2021-10-20 PROCEDURE — 96366 THER/PROPH/DIAG IV INF ADDON: CPT

## 2021-10-20 PROCEDURE — 81001 URINALYSIS AUTO W/SCOPE: CPT

## 2021-10-20 PROCEDURE — 76705 ECHO EXAM OF ABDOMEN: CPT

## 2021-10-20 PROCEDURE — 85025 COMPLETE CBC W/AUTO DIFF WBC: CPT

## 2021-10-20 PROCEDURE — 87088 URINE BACTERIA CULTURE: CPT

## 2021-10-20 PROCEDURE — 6360000002 HC RX W HCPCS: Performed by: EMERGENCY MEDICINE

## 2021-10-20 PROCEDURE — 74177 CT ABD & PELVIS W/CONTRAST: CPT

## 2021-10-20 PROCEDURE — 82140 ASSAY OF AMMONIA: CPT

## 2021-10-20 PROCEDURE — 96375 TX/PRO/DX INJ NEW DRUG ADDON: CPT

## 2021-10-20 RX ORDER — FENTANYL CITRATE 50 UG/ML
25 INJECTION, SOLUTION INTRAMUSCULAR; INTRAVENOUS ONCE
Status: DISCONTINUED | OUTPATIENT
Start: 2021-10-20 | End: 2021-10-23 | Stop reason: HOSPADM

## 2021-10-20 RX ORDER — POTASSIUM CHLORIDE 7.45 MG/ML
10 INJECTION INTRAVENOUS ONCE
Status: COMPLETED | OUTPATIENT
Start: 2021-10-20 | End: 2021-10-21

## 2021-10-20 RX ORDER — POTASSIUM CHLORIDE 7.45 MG/ML
10 INJECTION INTRAVENOUS ONCE
Status: COMPLETED | OUTPATIENT
Start: 2021-10-20 | End: 2021-10-20

## 2021-10-20 RX ORDER — MAGNESIUM SULFATE IN WATER 40 MG/ML
2000 INJECTION, SOLUTION INTRAVENOUS ONCE
Status: COMPLETED | OUTPATIENT
Start: 2021-10-20 | End: 2021-10-21

## 2021-10-20 RX ADMIN — POTASSIUM CHLORIDE 10 MEQ: 7.46 INJECTION, SOLUTION INTRAVENOUS at 22:14

## 2021-10-20 RX ADMIN — IOPAMIDOL 75 ML: 755 INJECTION, SOLUTION INTRAVENOUS at 21:46

## 2021-10-20 RX ADMIN — CEFTRIAXONE 1000 MG: 1 INJECTION, POWDER, FOR SOLUTION INTRAMUSCULAR; INTRAVENOUS at 22:14

## 2021-10-20 RX ADMIN — POTASSIUM CHLORIDE 10 MEQ: 7.46 INJECTION, SOLUTION INTRAVENOUS at 23:25

## 2021-10-20 ASSESSMENT — PAIN DESCRIPTION - LOCATION: LOCATION: ABDOMEN

## 2021-10-20 ASSESSMENT — PAIN SCALES - GENERAL: PAINLEVEL_OUTOF10: 5

## 2021-10-20 ASSESSMENT — PAIN DESCRIPTION - PAIN TYPE: TYPE: ACUTE PAIN

## 2021-10-20 NOTE — ED PROVIDER NOTES
FIRST PROVIDER CONTACT ASSESSMENT NOTE                                                                                                Department of Emergency Medicine                                                      First Provider Note  10/20/21  4:50 PM EDT  NAME: Heena Ardon  : 1980  MRN: [id number]    Chief Complaint: Jaundice (recent admission, labs drawn yesterday with [facility]- liver failure )      History of Present Illness:   Heena Ardon is a 42 y.o. female who presents to the ED for worsening jaundice and labs. States she is in liver failure secondary to alcohol. Reports she has had no alcoholic beverage for 7 days now. Recent admission for the same. Sent in by GI. Abdomen is swollen and uncomfortable    Focused Physical Exam:  VS:    ED Triage Vitals [10/20/21 1648]   BP Temp Temp src Pulse Resp SpO2 Height Weight   128/71 98.5 °F (36.9 °C) -- 124 16 97 % 5' 2\" (1.575 m) 148 lb (67.1 kg)        General: Alert and in no apparent distress. Medical History:  has a past medical history of Cooper-Danlos disease. Surgical History:  has a past surgical history that includes Tonsillectomy. Social History:  reports that she has been smoking. She has been smoking about 1.00 pack per day. She has never used smokeless tobacco. She reports current alcohol use. She reports that she does not use drugs. Family History: family history is not on file.     Allergies: Erythromycin and Penicillins     Initial Plan of Care:  Initiate Treatment-Testing, Proceed toTreatment Area When Bed Available for ED Attending/MLP to Continue Care    -------------------------------------------------END OF FIRST PROVIDER CONTACT ASSESSMENT NOTE--------------------------------------------------------  Electronically signed by Lily Berkowitz PA-C   DD: 10/20/21       Lily Berkowitz PA-C  10/20/21 [id number]

## 2021-10-21 PROBLEM — E80.6 HYPERBILIRUBINEMIA: Status: ACTIVE | Noted: 2021-10-21

## 2021-10-21 LAB
ALBUMIN SERPL-MCNC: 2.4 G/DL (ref 3.5–5.2)
ALP BLD-CCNC: 206 U/L (ref 35–104)
ALT SERPL-CCNC: 33 U/L (ref 0–32)
AMMONIA: 55.8 UMOL/L (ref 11–51)
ANION GAP SERPL CALCULATED.3IONS-SCNC: 15 MMOL/L (ref 7–16)
AST SERPL-CCNC: 75 U/L (ref 0–31)
BASOPHILS ABSOLUTE: 0.03 E9/L (ref 0–0.2)
BASOPHILS RELATIVE PERCENT: 0.4 % (ref 0–2)
BILIRUB SERPL-MCNC: 17.6 MG/DL (ref 0–1.2)
BUN BLDV-MCNC: 3 MG/DL (ref 6–20)
CALCIUM SERPL-MCNC: 7.9 MG/DL (ref 8.6–10.2)
CHLORIDE BLD-SCNC: 98 MMOL/L (ref 98–107)
CHP ED QC CHECK: NORMAL
CO2: 24 MMOL/L (ref 22–29)
CREAT SERPL-MCNC: 0.4 MG/DL (ref 0.5–1)
EOSINOPHILS ABSOLUTE: 0.09 E9/L (ref 0.05–0.5)
EOSINOPHILS RELATIVE PERCENT: 1.2 % (ref 0–6)
FOLATE: 3.8 NG/ML (ref 4.8–24.2)
GFR AFRICAN AMERICAN: >60
GFR NON-AFRICAN AMERICAN: >60 ML/MIN/1.73
GLUCOSE BLD-MCNC: 81 MG/DL (ref 74–99)
HCT VFR BLD CALC: 32.1 % (ref 34–48)
HEMOCCULT STL QL: NEGATIVE
HEMOGLOBIN: 11.1 G/DL (ref 11.5–15.5)
IMMATURE GRANULOCYTES #: 0.07 E9/L
IMMATURE GRANULOCYTES %: 0.9 % (ref 0–5)
INR BLD: 1.2
LYMPHOCYTES ABSOLUTE: 0.89 E9/L (ref 1.5–4)
LYMPHOCYTES RELATIVE PERCENT: 11.6 % (ref 20–42)
MAGNESIUM: 2.3 MG/DL (ref 1.6–2.6)
MCH RBC QN AUTO: 36.4 PG (ref 26–35)
MCHC RBC AUTO-ENTMCNC: 34.6 % (ref 32–34.5)
MCV RBC AUTO: 105.2 FL (ref 80–99.9)
MONOCYTES ABSOLUTE: 1.06 E9/L (ref 0.1–0.95)
MONOCYTES RELATIVE PERCENT: 13.9 % (ref 2–12)
NEUTROPHILS ABSOLUTE: 5.51 E9/L (ref 1.8–7.3)
NEUTROPHILS RELATIVE PERCENT: 72 % (ref 43–80)
PDW BLD-RTO: 15.4 FL (ref 11.5–15)
PLATELET # BLD: 234 E9/L (ref 130–450)
PMV BLD AUTO: 10.3 FL (ref 7–12)
POTASSIUM REFLEX MAGNESIUM: 3.3 MMOL/L (ref 3.5–5)
PROTHROMBIN TIME: 13.5 SEC (ref 9.3–12.4)
RBC # BLD: 3.05 E12/L (ref 3.5–5.5)
SODIUM BLD-SCNC: 137 MMOL/L (ref 132–146)
TOTAL PROTEIN: 4.4 G/DL (ref 6.4–8.3)
VITAMIN B-12: 588 PG/ML (ref 211–946)
WBC # BLD: 7.7 E9/L (ref 4.5–11.5)

## 2021-10-21 PROCEDURE — 6370000000 HC RX 637 (ALT 250 FOR IP): Performed by: NURSE PRACTITIONER

## 2021-10-21 PROCEDURE — 6370000000 HC RX 637 (ALT 250 FOR IP): Performed by: INTERNAL MEDICINE

## 2021-10-21 PROCEDURE — 6360000002 HC RX W HCPCS: Performed by: INTERNAL MEDICINE

## 2021-10-21 PROCEDURE — 82746 ASSAY OF FOLIC ACID SERUM: CPT

## 2021-10-21 PROCEDURE — 96375 TX/PRO/DX INJ NEW DRUG ADDON: CPT

## 2021-10-21 PROCEDURE — 6360000002 HC RX W HCPCS: Performed by: EMERGENCY MEDICINE

## 2021-10-21 PROCEDURE — 82607 VITAMIN B-12: CPT

## 2021-10-21 PROCEDURE — 6360000002 HC RX W HCPCS: Performed by: NURSE PRACTITIONER

## 2021-10-21 PROCEDURE — 2580000003 HC RX 258: Performed by: INTERNAL MEDICINE

## 2021-10-21 PROCEDURE — 80053 COMPREHEN METABOLIC PANEL: CPT

## 2021-10-21 PROCEDURE — 85025 COMPLETE CBC W/AUTO DIFF WBC: CPT

## 2021-10-21 PROCEDURE — 96367 TX/PROPH/DG ADDL SEQ IV INF: CPT

## 2021-10-21 PROCEDURE — 82140 ASSAY OF AMMONIA: CPT

## 2021-10-21 PROCEDURE — G0378 HOSPITAL OBSERVATION PER HR: HCPCS

## 2021-10-21 PROCEDURE — 83735 ASSAY OF MAGNESIUM: CPT

## 2021-10-21 PROCEDURE — 96376 TX/PRO/DX INJ SAME DRUG ADON: CPT

## 2021-10-21 PROCEDURE — 36415 COLL VENOUS BLD VENIPUNCTURE: CPT

## 2021-10-21 PROCEDURE — 85610 PROTHROMBIN TIME: CPT

## 2021-10-21 RX ORDER — LACTULOSE 10 G/15ML
10 SOLUTION ORAL 2 TIMES DAILY
Status: DISCONTINUED | OUTPATIENT
Start: 2021-10-21 | End: 2021-10-21

## 2021-10-21 RX ORDER — LACTULOSE 10 G/15ML
20 SOLUTION ORAL 2 TIMES DAILY
Status: DISCONTINUED | OUTPATIENT
Start: 2021-10-21 | End: 2021-10-23 | Stop reason: HOSPADM

## 2021-10-21 RX ORDER — POTASSIUM CHLORIDE 7.45 MG/ML
10 INJECTION INTRAVENOUS PRN
Status: DISCONTINUED | OUTPATIENT
Start: 2021-10-21 | End: 2021-10-23 | Stop reason: HOSPADM

## 2021-10-21 RX ORDER — LANOLIN ALCOHOL/MO/W.PET/CERES
100 CREAM (GRAM) TOPICAL DAILY
Status: DISCONTINUED | OUTPATIENT
Start: 2021-10-21 | End: 2021-10-23 | Stop reason: HOSPADM

## 2021-10-21 RX ORDER — SODIUM CHLORIDE 9 MG/ML
INJECTION, SOLUTION INTRAVENOUS CONTINUOUS
Status: DISCONTINUED | OUTPATIENT
Start: 2021-10-21 | End: 2021-10-23 | Stop reason: HOSPADM

## 2021-10-21 RX ORDER — SODIUM CHLORIDE 9 MG/ML
25 INJECTION, SOLUTION INTRAVENOUS PRN
Status: DISCONTINUED | OUTPATIENT
Start: 2021-10-21 | End: 2021-10-23 | Stop reason: HOSPADM

## 2021-10-21 RX ORDER — PANTOPRAZOLE SODIUM 40 MG/1
40 TABLET, DELAYED RELEASE ORAL
Status: DISCONTINUED | OUTPATIENT
Start: 2021-10-21 | End: 2021-10-23 | Stop reason: HOSPADM

## 2021-10-21 RX ORDER — SODIUM CHLORIDE 0.9 % (FLUSH) 0.9 %
10 SYRINGE (ML) INJECTION EVERY 12 HOURS SCHEDULED
Status: DISCONTINUED | OUTPATIENT
Start: 2021-10-21 | End: 2021-10-23 | Stop reason: HOSPADM

## 2021-10-21 RX ORDER — FAMOTIDINE 20 MG/1
40 TABLET, FILM COATED ORAL DAILY
COMMUNITY
End: 2022-04-08 | Stop reason: ALTCHOICE

## 2021-10-21 RX ORDER — PANTOPRAZOLE SODIUM 40 MG/1
40 TABLET, DELAYED RELEASE ORAL
Status: DISCONTINUED | OUTPATIENT
Start: 2021-10-22 | End: 2021-10-21

## 2021-10-21 RX ORDER — SENNA PLUS 8.6 MG/1
1 TABLET ORAL DAILY PRN
Status: DISCONTINUED | OUTPATIENT
Start: 2021-10-21 | End: 2021-10-23 | Stop reason: HOSPADM

## 2021-10-21 RX ORDER — SODIUM CHLORIDE 0.9 % (FLUSH) 0.9 %
10 SYRINGE (ML) INJECTION PRN
Status: DISCONTINUED | OUTPATIENT
Start: 2021-10-21 | End: 2021-10-23 | Stop reason: HOSPADM

## 2021-10-21 RX ORDER — POTASSIUM CHLORIDE 20 MEQ/1
40 TABLET, EXTENDED RELEASE ORAL PRN
Status: DISCONTINUED | OUTPATIENT
Start: 2021-10-21 | End: 2021-10-23 | Stop reason: HOSPADM

## 2021-10-21 RX ORDER — NICOTINE 21 MG/24HR
1 PATCH, TRANSDERMAL 24 HOURS TRANSDERMAL DAILY
Status: DISCONTINUED | OUTPATIENT
Start: 2021-10-21 | End: 2021-10-23 | Stop reason: HOSPADM

## 2021-10-21 RX ORDER — METHYLPREDNISOLONE SODIUM SUCCINATE 40 MG/ML
20 INJECTION, POWDER, LYOPHILIZED, FOR SOLUTION INTRAMUSCULAR; INTRAVENOUS DAILY
Status: DISCONTINUED | OUTPATIENT
Start: 2021-10-21 | End: 2021-10-23 | Stop reason: HOSPADM

## 2021-10-21 RX ADMIN — METHYLPREDNISOLONE SODIUM SUCCINATE 20 MG: 40 INJECTION, POWDER, LYOPHILIZED, FOR SOLUTION INTRAMUSCULAR; INTRAVENOUS at 10:45

## 2021-10-21 RX ADMIN — SODIUM CHLORIDE: 9 INJECTION, SOLUTION INTRAVENOUS at 01:47

## 2021-10-21 RX ADMIN — POTASSIUM CHLORIDE 40 MEQ: 1500 TABLET, EXTENDED RELEASE ORAL at 09:50

## 2021-10-21 RX ADMIN — LACTULOSE 10 G: 20 SOLUTION ORAL at 01:47

## 2021-10-21 RX ADMIN — PANTOPRAZOLE SODIUM 40 MG: 40 TABLET, DELAYED RELEASE ORAL at 17:42

## 2021-10-21 RX ADMIN — CEFTRIAXONE 1000 MG: 1 INJECTION, POWDER, FOR SOLUTION INTRAMUSCULAR; INTRAVENOUS at 22:01

## 2021-10-21 RX ADMIN — SODIUM CHLORIDE: 9 INJECTION, SOLUTION INTRAVENOUS at 17:45

## 2021-10-21 RX ADMIN — MAGNESIUM SULFATE HEPTAHYDRATE 2000 MG: 40 INJECTION, SOLUTION INTRAVENOUS at 00:40

## 2021-10-21 RX ADMIN — LACTULOSE 20 G: 20 SOLUTION ORAL at 22:01

## 2021-10-21 ASSESSMENT — ENCOUNTER SYMPTOMS
ABDOMINAL PAIN: 1
SINUS PRESSURE: 0
PHOTOPHOBIA: 0
SHORTNESS OF BREATH: 0
ABDOMINAL DISTENTION: 0
COUGH: 0
CHOKING: 0
CHEST TIGHTNESS: 0
NAUSEA: 0
CONSTIPATION: 0
APNEA: 0
WHEEZING: 0
DIARRHEA: 0
VOMITING: 0
BACK PAIN: 0
RHINORRHEA: 0
COLOR CHANGE: 0
COLOR CHANGE: 1
SINUS PAIN: 0

## 2021-10-21 ASSESSMENT — PAIN SCALES - GENERAL: PAINLEVEL_OUTOF10: 0

## 2021-10-21 NOTE — H&P
History & Physicial  10/21/21  Primary Care:  Dalia Chadwick DO  [address]  [place] [address] [id number]      Chief Complaint   Patient presents with    Jaundice     recent admission, labs drawn yesterday with [facility]- liver failure     Abdominal Pain     \"uncomfortable, i'm swollen where my liver is\"        HPI:  Patient is a 42year old who presented to ER with worsening Jaundice and RUQ pain. Patient was discharged on 10/16/2021 after stay for acute alcoholic hepatitis. She has been off of alcohol for 8 days. She states that her yellowing of her eyes and skin have worsened. She had outpatient labs done at her hospital follow up visit and bilirubin went from 11 to 20.5. Patient denies using any acetaminophen. She has had no fevers, chills, or sick contacts. She has noticed her urine went from a clear color to a orange discolored color since discharge. Prior to Visit Medications    Medication Sig Taking? Authorizing Provider   thiamine 100 MG tablet Take 1 tablet by mouth daily  Verónica Monsivais, DO   lactulose (CHRONULAC) 10 GM/15ML solution Take 15 mLs by mouth every evening  Verónica Monsivais, DO   Esomeprazole Magnesium (NEXIUM PO) Take by mouth  Historical Provider, MD     Social History     Tobacco Use    Smoking status: Current Every Day Smoker     Packs/day: 1.00    Smokeless tobacco: Never Used   Vaping Use    Vaping Use: Never used   Substance Use Topics    Alcohol use: Not Currently     Comment: not for 7 days as of 10/20/21    Drug use: No     History reviewed. No pertinent family history. Past Surgical History:   Procedure Laterality Date    TONSILLECTOMY       Past Medical History:   Diagnosis Date    Cooper-Danlos disease      Review of Systems   Constitutional: Positive for fatigue. Negative for activity change, chills and fever. HENT: Negative for congestion, hearing loss, postnasal drip, sinus pressure and sinus pain. Eyes: Negative for photophobia and visual disturbance. distension. Palpations: Abdomen is soft. Tenderness: There is abdominal tenderness (RUQ). There is no guarding or rebound. Musculoskeletal:         General: Normal range of motion. Cervical back: Normal range of motion and neck supple. Right lower leg: Edema present. Left lower leg: Edema present. Skin:     General: Skin is warm and dry. Capillary Refill: Capillary refill takes less than 2 seconds. Coloration: Skin is jaundiced. Neurological:      General: No focal deficit present. Mental Status: She is alert and oriented to person, place, and time. Psychiatric:         Mood and Affect: Mood normal.         Behavior: Behavior normal.         Active Problems:    Hyperbilirubinemia  Resolved Problems:    * No resolved hospital problems. *  1. Alcoholic Hepatitis  2. Hyperbilirubinemia  3. Lactic acidosis   4. Recent Alcohol Cessation   5. Hypokalemia  6. Macrocytic anemia   7. UTI     Plan:  Admit to inpatient. GI consulted. Maddrey's Discriminant function for alcoholic hepatitis is 22.1 <32 to consider glucocorticoids. MELD- NA score on admission 23. Continue light IVF. Bilirubin down to 17 this am. Monitor blood pressure, electrolytes closely.      DVT Prophylaxis: PCDs  Code Status: FULL    Jefry Hough DO      Electronically signed by Jefry Hough DO on 10/21/2021 at 6:22 AM

## 2021-10-21 NOTE — PROGRESS NOTES
PROGRESS NOTE    Patient Presents with/Seen in Consultation For      *acute liver failure bili 22  CHIEF COMPLAINT:  Jaundice and abdominal pain    Subjective:     Patient denies abd pain, n/v. Expresses frustration over worsening jaundice. Pt states her feet are swollen bilaterally. Pt states she is walking around room. Denies BM today. States she is eating very little. Review of Systems  Aside from what was mentioned in the PMH and HPI, essentially unremarkable, all others negative. Objective:     BP 99/64   Pulse 90   Temp 98.5 °F (36.9 °C) (Oral)   Resp 16   Ht 5' 2\" (1.575 m)   Wt 148 lb (67.1 kg)   LMP 10/02/2021   SpO2 99%   BMI 27.07 kg/m²     General appearance: alert, awake, icteric, sitting up in bed in no apparent distress, and cooperative  Eyes: conjunctiva pink, sclera icteric. PERRL.   Lungs: clear to auscultation bilaterally  Heart: regular rate and rhythm, no murmur, 2+ pulses; 2+ BLE edema  Abdomen: soft, non-tender; bowel sounds normal; no masses,  no organomegaly  Extremities: extremities with 2+ BLE edema  Pulses: 2+ and symmetric  Skin: Skin color jaundiced, texture, turgor normal.   Neurologic: Grossly normal    folic acid (FOLVITE) tablet 1 mg, Daily  sodium chloride flush 0.9 % injection 10 mL, 2 times per day  sodium chloride flush 0.9 % injection 10 mL, PRN  0.9 % sodium chloride infusion, PRN  potassium chloride (KLOR-CON M) extended release tablet 40 mEq, PRN   Or  potassium bicarb-citric acid (EFFER-K) effervescent tablet 40 mEq, PRN   Or  potassium chloride 10 mEq/100 mL IVPB (Peripheral Line), PRN  senna (SENOKOT) tablet 8.6 mg, Daily PRN  0.9 % sodium chloride infusion, Continuous  cefTRIAXone (ROCEPHIN) 1,000 mg in sterile water 10 mL IV syringe, Q24H  lactulose (CHRONULAC) 10 GM/15ML solution 20 g, BID  methylPREDNISolone sodium (SOLU-MEDROL) injection 20 mg, Daily  thiamine tablet 100 mg, Daily  nicotine (NICODERM CQ) 21 MG/24HR 1 patch, Daily  pantoprazole (PROTONIX) tablet 40 mg, BID AC  fentaNYL (SUBLIMAZE) injection 25 mcg, Once         Data Review  CBC:   Lab Results   Component Value Date    WBC 8.1 10/22/2021    RBC 3.23 10/22/2021    HGB 11.7 10/22/2021    HCT 34.4 10/22/2021    .5 10/22/2021    MCH 36.2 10/22/2021    MCHC 34.0 10/22/2021    RDW 15.4 10/22/2021     10/22/2021    MPV 10.3 10/22/2021     CMP:    Lab Results   Component Value Date     10/22/2021    K 3.6 10/22/2021     10/22/2021    CO2 23 10/22/2021    BUN 3 10/22/2021    CREATININE 0.4 10/22/2021    GFRAA >60 10/22/2021    LABGLOM >60 10/22/2021    GLUCOSE 93 10/22/2021    PROT 4.7 10/22/2021    LABALBU 2.8 10/22/2021    CALCIUM 8.3 10/22/2021    BILITOT 19.0 10/22/2021    ALKPHOS 223 10/22/2021    AST 94 10/22/2021    ALT 40 10/22/2021     Hepatic Function Panel:    Lab Results   Component Value Date    ALKPHOS 223 10/22/2021    ALT 40 10/22/2021    AST 94 10/22/2021    PROT 4.7 10/22/2021    BILITOT 19.0 10/22/2021    BILIDIR 8.6 10/16/2021    IBILI 2.4 10/16/2021    LABALBU 2.8 10/22/2021       PT/INR:    Lab Results   Component Value Date    PROTIME 12.3 10/22/2021    INR 1.1 10/22/2021     IRON:    Lab Results   Component Value Date    IRON 123 10/15/2021     Iron Saturation:  No components found for: PERCENTFE  FERRITIN:    Lab Results   Component Value Date    FERRITIN 935 10/15/2021         Assessment:     Active Problems:  ? Acute hepatitis  ? Jaundice  ? Hepatic steatosis, likely secondary to alcohol  ? Elevated LFT's, most likely secondary to above, prior serology negative, no CBD dilatation on CT, medications reviewed  ? Anemia, macrocytic, hyperchromic; occult stool negative   ? Hyperammonemia  ? Hepatic fibrosis, F1  ? Cooper-Danlos disease   ? Hx GERD, gastritis, small hiatal hernia   ? MELD 28.02; DI 22.66  ? Unintentional weight loss, 60#/1yr  ?  Hypokalemia- defer to PCP    Plan:     · Continue Protonix 40 mg daily  · Continue Lactulose to 20 g twice daily  · Continue IV Solu-Medrol 20 mg daily  · IV fluids as ordered per PCP  · Diet as tolerated  · Monitor CBC, CMP, ammonia and INR daily  · Medical management per PCP  · Potassium management per PCP  · Supportive Care  · Continue to monitor    Note: This report was completed utilizing computer voice recognition software. Every effort has been made to ensure accuracy, however; inadvertent computerized transcription errors may be present. Discussed with Dr. Evangelista Verduzco per Dr. Dash Calvillo [id number], FNP-BC 10/22/2021 10:57 AM For Dr. Jerry Peres. I HAD A FACE TO FACE ENCOUNTER WITH THE PATIENT. AGREE WITH THE EXAM, ASSESSMENT, AND PLAN AS OUTLINED ABOVE. ADDITION AND CORRECTIONS WERE DONE AS DEEMED APPROPRIATE. MY EXAM AND PLAN INCLUDE: CLINICALLY OVERALL MUCH BETTER ON PREDNISONE. BILIRUBIN STILL ELEVATED. URINE CULTURE WITH E-COLI, ON ROCEPHIN WHICH SHOULD HELP. OK FOR DISCHARGE TOMORROW, OFFICE FOLLOW UP NEXT WEEK.

## 2021-10-21 NOTE — ED PROVIDER NOTES
Eleanor Coe is a 42 y.o. female presenting to the ED for jaundice, beginning pta ago. The complaint has been intermittent, moderate in severity, and worsened by nothing. 41 yo f who has history of cooper danlos, etoh abuse was previously drinking  was recently admitted on 10/14/21 for acuteliver failure asnd hepatitis 2 to etoh. Pt hasnt had any etoh for 7 days pt was seen by GI dr Charis Spencer, pt is following w [facility] behavior counseling. Pt has had worsening jaundice. Denies pt denies any apap or nsaqids. Pt is on vitamin b 1 and is on lactulose. Pt is having ruq pain    Review of Systems:   Review of Systems   Constitutional: Negative for activity change, fatigue and fever. HENT: Negative for congestion, postnasal drip, rhinorrhea and sinus pain. Eyes: Negative for photophobia and visual disturbance. Respiratory: Negative for choking, shortness of breath and wheezing. Cardiovascular: Negative for chest pain, palpitations and leg swelling. Gastrointestinal: Positive for abdominal pain. Endocrine: Negative for polyphagia and polyuria. Genitourinary: Negative for difficulty urinating, enuresis and flank pain. Musculoskeletal: Negative for arthralgias and back pain. Skin: Negative for color change and pallor. Allergic/Immunologic: Negative for food allergies and immunocompromised state. Neurological: Negative for facial asymmetry, light-headedness and numbness. Hematological: Negative for adenopathy. Does not bruise/bleed easily. Psychiatric/Behavioral: Negative for agitation and confusion.               --------------------------------------------- PAST HISTORY ---------------------------------------------  Past Medical History:  has a past medical history of Cooper-Danlos disease. Past Surgical History:  has a past surgical history that includes Tonsillectomy. Social History:  reports that she has been smoking. She has been smoking about 1.00 pack per day.  She has never used smokeless tobacco. She reports previous alcohol use. She reports that she does not use drugs. Family History: family history is not on file. The patients home medications have been reviewed.     Allergies: Erythromycin and Penicillins    -------------------------------------------------- RESULTS -------------------------------------------------  All laboratory and radiology results have been personally reviewed by myself   LABS:  Results for orders placed or performed during the hospital encounter of 10/20/21   CBC Auto Differential   Result Value Ref Range    WBC 9.4 4.5 - 11.5 E9/L    RBC 3.99 3.50 - 5.50 E12/L    Hemoglobin 14.4 11.5 - 15.5 g/dL    Hematocrit 41.8 34.0 - 48.0 %    .8 (H) 80.0 - 99.9 fL    MCH 36.1 (H) 26.0 - 35.0 pg    MCHC 34.4 32.0 - 34.5 %    RDW 15.4 (H) 11.5 - 15.0 fL    Platelets 940 029 - 028 E9/L    MPV 10.2 7.0 - 12.0 fL    Neutrophils % 73.5 43.0 - 80.0 %    Immature Granulocytes % 1.2 0.0 - 5.0 %    Lymphocytes % 10.2 (L) 20.0 - 42.0 %    Monocytes % 13.7 (H) 2.0 - 12.0 %    Eosinophils % 0.9 0.0 - 6.0 %    Basophils % 0.5 0.0 - 2.0 %    Neutrophils Absolute 6.90 1.80 - 7.30 E9/L    Immature Granulocytes # 0.11 E9/L    Lymphocytes Absolute 0.96 (L) 1.50 - 4.00 E9/L    Monocytes Absolute 1.29 (H) 0.10 - 0.95 E9/L    Eosinophils Absolute 0.08 0.05 - 0.50 E9/L    Basophils Absolute 0.05 0.00 - 0.20 E9/L   Comprehensive Metabolic Panel w/ Reflex to MG   Result Value Ref Range    Sodium 133 132 - 146 mmol/L    Potassium reflex Magnesium 3.0 (L) 3.5 - 5.0 mmol/L    Chloride 92 (L) 98 - 107 mmol/L    CO2 25 22 - 29 mmol/L    Anion Gap 16 7 - 16 mmol/L    Glucose 121 (H) 74 - 99 mg/dL    BUN 4 (L) 6 - 20 mg/dL    CREATININE 0.4 (L) 0.5 - 1.0 mg/dL    GFR Non-African American >60 >=60 mL/min/1.73    GFR African American >60     Calcium 9.1 8.6 - 10.2 mg/dL    Total Protein 5.6 (L) 6.4 - 8.3 g/dL    Albumin 3.3 (L) 3.5 - 5.2 g/dL    Total Bilirubin 21.1 (H) 0.0 - 1.2 mg/dL    Alkaline Phosphatase 274 (H) 35 - 104 U/L    ALT 47 (H) 0 - 32 U/L    AST 89 (H) 0 - 31 U/L   Lactic Acid, Plasma   Result Value Ref Range    Lactic Acid 2.8 (H) 0.5 - 2.2 mmol/L   Urinalysis   Result Value Ref Range    Color, UA DARK YELLOW (A) Straw/Yellow    Clarity, UA SL CLOUDY Clear    Glucose, Ur 250 (A) Negative mg/dL    Bilirubin Urine LARGE (A) Negative    Ketones, Urine 40 (A) Negative mg/dL    Specific Gravity, UA 1.025 1.005 - 1.030    Blood, Urine Negative Negative    pH, UA 6.5 5.0 - 9.0    Protein,  (A) Negative mg/dL    Urobilinogen, Urine 2.0 (A) <2.0 E.U./dL    Nitrite, Urine POSITIVE (A) Negative    Leukocyte Esterase, Urine MODERATE (A) Negative   Magnesium   Result Value Ref Range    Magnesium 1.8 1.6 - 2.6 mg/dL   Protime-INR   Result Value Ref Range    Protime 13.6 (H) 9.3 - 12.4 sec    INR 1.2    APTT   Result Value Ref Range    aPTT 30.3 24.5 - 35.1 sec   Ammonia   Result Value Ref Range    Ammonia 51.8 (H) 11.0 - 51.0 umol/L   Microscopic Urinalysis   Result Value Ref Range    WBC, UA 5-10 (A) 0 - 5 /HPF    RBC, UA NONE 0 - 2 /HPF    Epithelial Cells, UA FEW /HPF    Bacteria, UA MODERATE (A) None Seen /HPF   POC Pregnancy Urine   Result Value Ref Range    HCG, Urine, POC Negative Negative    Lot Number [id number]     Positive QC Pass/Fail Pass     Negative QC Pass/Fail Pass        RADIOLOGY:  Interpreted by Radiologist.  CT ABDOMEN PELVIS W IV CONTRAST Additional Contrast? None   Final Result   Redemonstration of enlarged fatty liver. There is no evidence of biliary   ductal dilatation. Please note jaundice may be caused by steatohepatitis. 6 mm nonobstructing right renal stone. US GALLBLADDER RUQ   Final Result   Enlarged fatty liver.       Mild thickening of the gallbladder wall, with no evidence of gallstones or   pericholecystic fluid collection.             ------------------------- NURSING NOTES AND VITALS REVIEWED ---------------------------   The nursing notes within the ED encounter and vital signs as below have been reviewed. /65   Pulse 106   Temp 98.5 °F (36.9 °C)   Resp 16   Ht 5' 2\" (1.575 m)   Wt 148 lb (67.1 kg)   LMP 10/02/2021   SpO2 96%   BMI 27.07 kg/m²   Oxygen Saturation Interpretation: Normal      ---------------------------------------------------PHYSICAL EXAM--------------------------------------    Physical Exam  Vitals reviewed. Constitutional:       General: She is not in acute distress. Appearance: Normal appearance. She is not toxic-appearing. HENT:      Head: Normocephalic and atraumatic. Right Ear: External ear normal.      Left Ear: External ear normal.      Nose: Nose normal. No congestion. Mouth/Throat:      Mouth: Mucous membranes are moist.      Pharynx: Oropharynx is clear. No posterior oropharyngeal erythema. Eyes:      Extraocular Movements: Extraocular movements intact. Pupils: Pupils are equal, round, and reactive to light. Cardiovascular:      Rate and Rhythm: Normal rate and regular rhythm. Pulses: Normal pulses. Heart sounds: No murmur heard. Pulmonary:      Effort: Pulmonary effort is normal.      Breath sounds: No wheezing or rhonchi. Chest:      Chest wall: No tenderness. Abdominal:      General: Abdomen is flat. Bowel sounds are normal.      Palpations: Abdomen is soft. There is no hepatomegaly or splenomegaly. Tenderness: There is abdominal tenderness in the right upper quadrant. There is no right CVA tenderness, left CVA tenderness or guarding. Hernia: No hernia is present. Musculoskeletal:         General: No swelling or deformity. Cervical back: Normal range of motion and neck supple. No muscular tenderness. Skin:     General: Skin is warm and dry. Capillary Refill: Capillary refill takes less than 2 seconds. Coloration: Skin is jaundiced. Neurological:      General: No focal deficit present.       Mental Status: She is alert and oriented to person, place, and time. Cranial Nerves: No cranial nerve deficit. Motor: No weakness. Psychiatric:         Mood and Affect: Mood normal.               ------------------------------ ED COURSE/MEDICAL DECISION MAKING----------------------  Medications   fentaNYL (SUBLIMAZE) injection 25 mcg (25 mcg IntraVENous Not Given 10/20/21 2130)   magnesium sulfate 2000 mg in 50 mL IVPB premix (2,000 mg IntraVENous New Bag 10/21/21 0040)   iopamidol (ISOVUE-370) 76 % injection 75 mL (75 mLs IntraVENous Given 10/20/21 2146)   potassium chloride 10 mEq/100 mL IVPB (Peripheral Line) (0 mEq IntraVENous Stopped 10/20/21 2325)   potassium chloride 10 mEq/100 mL IVPB (Peripheral Line) (0 mEq IntraVENous Stopped 10/21/21 0050)   cefTRIAXone (ROCEPHIN) 1,000 mg in sterile water 10 mL IV syringe (0 mg IntraVENous Stopped 10/20/21 2325)         ED COURSE:  ED Course as of Oct 21 0059   Thu Oct 21, 2021   0058 Dw/ dr Merlin Vega who will admit    [NENO]      ED Course User Index  [NENO] Krzysztof Prado, DO       Medical Decision Making:      ETOH LIVER DZ, Madreys 27.5 points and not a candidate for steroids based on that. pts bili is going up, may end up roling in, coags normal    Discriminant Function  > 32 points indicates poor prognosis and patient may benefit from glucocorticoid therapy. 23 points  MELD Score (2016)*  19.6%  Estimated 3-Month Mortality      D/w dr Merlin Vega who will admit          Counseling: The emergency provider has spoken with the patient and discussed todays results, in addition to providing specific details for the plan of care and counseling regarding the diagnosis and prognosis. Questions are answered at this time and they are agreeable with the plan.      --------------------------------- IMPRESSION AND DISPOSITION ---------------------------------    IMPRESSION  1. Acute liver failure without hepatic coma    2. Jaundice    3.  History of alcoholism ([facility] DISPOSITION  Disposition: Admit to med/surg floor  Patient condition is fair      NOTE: This report was transcribed using voice recognition software.  Every effort was made to ensure accuracy; however, inadvertent computerized transcription errors may be present       Shay Alvarez DO  10/21/21 0105

## 2021-10-21 NOTE — CONSULTS
Gastroenterology Consult Note   QUEENIE Page with Robbi Coleman M.D. Consult Note        Date of Service: 10/21/2021  Reason for Consult: acute liver failure bili 25  Requesting Physician: Dr. La Almazan:  Jaundice and abdominal pain    History Obtained From:  Patient and EMR    HISTORY OF PRESENT ILLNESS:       Bharath Vega is a 42 y.o. female with significant past medical history of alcohol abuse, acute alcoholic hepatitis and Cooper-Danlos disease admitted via ED for jaundice and abdominal pain. Patient was recently admitted for acute alcoholic hepatitis on 40/37/27 and was discharged on 10/16/21 in stable condition. Patient admits to hx of alcohol abuse currently following with Lori Lala.  Patient had outpatient lab work completed at [facility] on 10/19/2021 showing a bilirubin of 20.5 with complains of fatigue and jaundice, she was advised to go to ER for evaluation. Pt reports when she went home she was feeling okay then couple days later complains of fatigue increasing yellowing to her skin and leg swelling. She reports she had a sour taste in her mouth denies dysphagia able to tolerate diet, no nausea or vomiting. She denies alcohol use since prior admission, denies other alcohol use, denies over-the-counter medication use, denies acetaminophen or ibuprofen use. Reports abdominal discomfort. Denies fever, chills or sick contacts. Reports bowel movements are soft brown to pale stating she was discharged on lactulose however just started taking yesterday. Urine noted to be yellow or orange. She reports she had for psychiatrist evaluation today that will have to be rescheduled. She also reports she is still living at home with her  who continues to drink daily stating\" I am not sure how long this is going to last staying with him while he is drinking, I have made up my mind and I am done\".   Admission labs alk phos 274, ALT 47, AST 89, bilirubin 21.1, H&H 14.4, ammonia 51.8, potassium 3, chloride 92, BUN 4, creatinine 0.4, lactic acid 2.8. CT abdomen/pelvis W IV contrast- Redemonstration of enlarged fatty liver. There is no evidence of biliary ductal dilatation. Please note jaundice may be caused by steatohepatitis. 6 mm nonobstructing right renal stone. US gallbladder- Enlarged fatty liver. Mild thickening of the gallbladder wall, with no evidence of gallstones or pericholecystic fluid collection. Consultation for acute liver failure bili 22. Pt is known to Dr. Rashel Gregory, last seen on inpatient on 1015/21. Initially seen in the office 10/8/2020 for steatosis of the liver with abnormal liver enzymes, labs and ultrasound ordered, she never had labs done. Ultrasound elastography at that time showed normal F1 fibrosis and fatty liver disease. She had EGD 1/8/2021 which showed GERD, small hiatal hernia, no varices, and gastritis. BRYANT was negative. Sprue showed peptic duodenitis. She was lost to follow-up, until last seen inpatient as noted above. Currently, pt reports no abdominal pain or nausea complains of fatigue. Labs today alk phos 206, ALT 33, AST 75, ammonia 55.8, bilirubin 17.6, H&H 11.1/32.1, occult stool negative, potassium 3.3.     Past Medical History:        Diagnosis Date    Cooper-Danlos disease      Past Surgical History:        Procedure Laterality Date    TONSILLECTOMY       Current Medications:    Current Facility-Administered Medications: sodium chloride flush 0.9 % injection 10 mL, 10 mL, IntraVENous, 2 times per day  sodium chloride flush 0.9 % injection 10 mL, 10 mL, IntraVENous, PRN  0.9 % sodium chloride infusion, 25 mL, IntraVENous, PRN  potassium chloride (KLOR-CON M) extended release tablet 40 mEq, 40 mEq, Oral, PRN **OR** potassium bicarb-citric acid (EFFER-K) effervescent tablet 40 mEq, 40 mEq, Oral, PRN **OR** potassium chloride 10 mEq/100 mL IVPB (Peripheral Line), 10 mEq, IntraVENous, PRN  senna (SENOKOT) tablet 8.6 mg, 1 tablet, Oral, Daily PRN  0.9 % sodium chloride infusion, , IntraVENous, Continuous  cefTRIAXone (ROCEPHIN) 1,000 mg in sterile water 10 mL IV syringe, 1,000 mg, IntraVENous, Q24H  lactulose (CHRONULAC) 10 GM/15ML solution 20 g, 20 g, Oral, BID  [START ON 10/22/2021] pantoprazole (PROTONIX) tablet 40 mg, 40 mg, Oral, QAM AC  methylPREDNISolone sodium (SOLU-MEDROL) injection 20 mg, 20 mg, IntraVENous, Daily  fentaNYL (SUBLIMAZE) injection 25 mcg, 25 mcg, IntraVENous, Once    Allergies:  Erythromycin and Penicillins    Social History:    Tobacco:  Pt reports she smokes cigarettes of < 1 ppd for 24 yrs. Alcohol:  Pt reports she drank \"straight up tequila\" of about 1/5 in a 5-day week, states her last drink was on 10/13/2021. Illicit Drugs: Pt denies.     Family History: Mother living with diverticulosis, diverticulitis, and bowel resections due to diverticulitis. She has Cooper-Danlos disease. Father living, DM 1.  1 sister living with thyroid disease. 1 brother living with seizures. 1 child with diabetes mellitus type 1.  1 child living and healthy. REVIEW OF SYSTEMS:    Aside from what was mentioned in the PMH and HPI, essentially unremarkable, all others negative. PHYSICAL EXAM:      Vitals:    BP (!) 96/57   Pulse 87   Temp 98.5 °F (36.9 °C)   Resp 16   Ht 5' 2\" (1.575 m)   Wt 148 lb (67.1 kg)   LMP 10/02/2021   SpO2 96%   BMI 27.07 kg/m²       CONSTITUTIONAL:  awake, alert, cooperative, no apparent distress, and appears stated age  EYES:  pupils equal, round and reactive to light, sclera icteric and conjunctiva pale  ENT:  normocephalic, oral pharynx with moist mucous membranes  NECK:  supple   HEMATOLOGIC/LYMPHATICS:  no cervical lymphadenopathy and no supraclavicular lymphadenopathy  LUNGS:  No increased work of breathing, good air exchange, clear to auscultation bilaterally.   CARDIOVASCULAR:  Normal apical impulse, regular rate and rhythm, no murmur noted; 2+ pulses; 1+ edema  ABDOMEN:  normal bowel sounds, softly-distended, non-tender to palpation, no masses palpated, no hepatosplenomegally  MUSCULOSKELETAL:  full range of motion noted  motor strength is 5 out of 5 all extremities bilaterally  NEUROLOGIC:  Mental Status Exam:  Level of Alertness:   awake  Orientation:   person, place, time  Motor Exam:  Motor exam is symmetrical 5 out of 5 all extremities bilaterally  SKIN: Jaundiced skin color, texture, turgor    DATA:    CBC with Differential:    Lab Results   Component Value Date    WBC 7.7 10/21/2021    RBC 3.05 10/21/2021    HGB 11.1 10/21/2021    HCT 32.1 10/21/2021     10/21/2021    .2 10/21/2021    MCH 36.4 10/21/2021    MCHC 34.6 10/21/2021    RDW 15.4 10/21/2021    LYMPHOPCT 11.6 10/21/2021    MONOPCT 13.9 10/21/2021    BASOPCT 0.4 10/21/2021    MONOSABS 1.06 10/21/2021    LYMPHSABS 0.89 10/21/2021    EOSABS 0.09 10/21/2021    BASOSABS 0.03 10/21/2021     CMP:    Lab Results   Component Value Date     10/21/2021    K 3.3 10/21/2021    CL 98 10/21/2021    CO2 24 10/21/2021    BUN 3 10/21/2021    CREATININE 0.4 10/21/2021    GFRAA >60 10/21/2021    LABGLOM >60 10/21/2021    GLUCOSE 81 10/21/2021    PROT 4.4 10/21/2021    LABALBU 2.4 10/21/2021    CALCIUM 7.9 10/21/2021    BILITOT 17.6 10/21/2021    ALKPHOS 206 10/21/2021    AST 75 10/21/2021    ALT 33 10/21/2021     Hepatic Function Panel:    Lab Results   Component Value Date    ALKPHOS 206 10/21/2021    ALT 33 10/21/2021    AST 75 10/21/2021    PROT 4.4 10/21/2021    BILITOT 17.6 10/21/2021    BILIDIR 8.6 10/16/2021    IBILI 2.4 10/16/2021    LABALBU 2.4 10/21/2021     PT/INR:    Lab Results   Component Value Date    PROTIME 13.5 10/21/2021    INR 1.2 10/21/2021     PTT:    Lab Results   Component Value Date    APTT 30.3 10/20/2021   [APTT}  Last 3 Troponin:  No results found for: TROPONINI  TSH:    Lab Results   Component Value Date    TSH 2.180 10/15/2021     VITAMIN B12: No results found for: [id number]  FOLATE:  No results found for: FOLATE  IRON:    Lab Results   Component Value Date    IRON 123 10/15/2021     Iron Saturation:    Lab Results   Component Value Date    LABIRON 122 10/15/2021     TIBC:    Lab Results   Component Value Date    TIBC 101 10/15/2021     FERRITIN:    Lab Results   Component Value Date    FERRITIN 935 10/15/2021     HIV:  No results found for: HIV  MARKIE:    Lab Results   Component Value Date    MARKIE NEGATIVE 10/15/2021         XR CHEST (2 VW)    Result Date: 10/14/2021  EXAMINATION: TWO XRAY VIEWS OF THE CHEST 10/14/2021 5:11 pm COMPARISON: None. HISTORY: ORDERING SYSTEM PROVIDED HISTORY: cardiac work-up TECHNOLOGIST PROVIDED HISTORY: Reason for exam:->cardiac work-up FINDINGS: The heart size is normal.  There are no infiltrates or effusions. Normal chest     CT ABDOMEN PELVIS W IV CONTRAST Additional Contrast? None    Result Date: 10/20/2021  EXAMINATION: CT OF THE ABDOMEN AND PELVIS WITH CONTRAST 10/20/2021 9:19 pm TECHNIQUE: CT of the abdomen and pelvis was performed with the administration of intravenous contrast. Multiplanar reformatted images are provided for review. Dose modulation, iterative reconstruction, and/or weight based adjustment of the mA/kV was utilized to reduce the radiation dose to as low as reasonably achievable. COMPARISON: October 14, 2021 HISTORY: ORDERING SYSTEM PROVIDED HISTORY: jaundice, abdominal pain, acute liver failure TECHNOLOGIST PROVIDED HISTORY: Additional Contrast?->None Reason for exam:->jaundice, abdominal pain, acute liver failure Decision Support Exception - unselect if not a suspected or confirmed emergency medical condition->Emergency Medical Condition (MA) FINDINGS: Lower Chest: No infiltrates or pleural effusion. Organs: Enlarged fatty liver with no focal lesions or biliary ductal dilatation. Gallbladder is present. Spleen is not enlarged. Pancreas demonstrates normal contour. Adrenal glands appear unremarkable.   There is symmetric enhancement of the kidneys with no hydronephrosis. There is a 6 mm nonobstructing right renal stone. GI/Bowel: No obstructing or constricting lesions. Appendix is normal. Pelvis: Bladder is suboptimally distended. The uterus is present. There is no significant free fluid. Peritoneum/Retroperitoneum: No free air or significant adenopathy. Bones/Soft Tissues: Unremarkable. Redemonstration of enlarged fatty liver. There is no evidence of biliary ductal dilatation. Please note jaundice may be caused by steatohepatitis. 6 mm nonobstructing right renal stone. CT ABDOMEN PELVIS W IV CONTRAST Additional Contrast? None    Result Date: 10/14/2021  EXAMINATION: CT OF THE ABDOMEN AND PELVIS WITH CONTRAST 10/14/2021 9:30 pm TECHNIQUE: CT of the abdomen and pelvis was performed with the administration of intravenous contrast. Multiplanar reformatted images are provided for review. Dose modulation, iterative reconstruction, and/or weight based adjustment of the mA/kV was utilized to reduce the radiation dose to as low as reasonably achievable. COMPARISON: Abdominopelvic CT angiogram from 08/25/2020. HISTORY: ORDERING SYSTEM PROVIDED HISTORY: jaundice concern for obstructive lesion TECHNOLOGIST PROVIDED HISTORY: Reason for exam:->jaundice concern for obstructive lesion Additional Contrast?->None Decision Support Exception - unselect if not a suspected or confirmed emergency medical condition->Emergency Medical Condition (MA) FINDINGS: Lower Chest: Visualized lung bases are clear. No acute abnormality. Organs: Severe diffuse hepatic steatosis. No visualized focal hepatic lesion. No significant abnormality is identified of the gallbladder, spleen, pancreas, adrenal glands or left kidney. 7 mm non-obstructing right nephrolith. No obstructive uropathy or pyelonephritis on either side. No intrahepatic nor extrahepatic biliary ductal dilatation. GI/Bowel: Normal appendix. No acute small or large bowel abnormality is identified.   Scattered uncomplicated diverticula are noted of predominantly left hemicolon. Pelvis: Normal bladder. 1.6 cm benign appearing cystic focus is seen within the posterior aspect of the left ovary, consistent with incidental dominant follicle. No acute abnormality of the adnexae or anteverted uterus. Peritoneum/Retroperitoneum: No free intraperitoneal air or fluid. No acute retroperitoneal process. Bones/Soft Tissues: No acute soft tissue or osseous abnormality is identified within the field of view. Severe diffuse hepatic steatosis, similar to prior study from 08/25/2020. No evidence of biliary obstruction. Please note that jaundice in the context of severe diffuse hepatic steatosis could represent steatohepatitis. 7 mm non-obstructing right nephrolith. US GALLBLADDER RUQ    Result Date: 10/20/2021  EXAMINATION: RIGHT UPPER QUADRANT ULTRASOUND 10/20/2021 7:12 pm COMPARISON: None. HISTORY: ORDERING SYSTEM PROVIDED HISTORY: liver failure TECHNOLOGIST PROVIDED HISTORY: Reason for exam:->liver failure What reading provider will be dictating this exam?->CRC FINDINGS: LIVER:  The liver is enlarged with increased echogenicity without evidence of intrahepatic biliary ductal dilatation. BILIARY SYSTEM:  Gallbladder is suboptimally distended without evidence of pericholecystic fluid, or stones. The gallbladder wall measures up to 5.8 mm. Negative sonographic Whittaker's sign. Common bile duct is within normal limits measuring 5.3 mm. RIGHT KIDNEY: The right kidney is grossly unremarkable without evidence of hydronephrosis. PANCREAS:  Visualized portions of the pancreas are unremarkable. OTHER: No evidence of right upper quadrant ascites. Enlarged fatty liver. Mild thickening of the gallbladder wall, with no evidence of gallstones or pericholecystic fluid collection.      US DUP LOWER EXTREMITIES BILATERAL VENOUS    Result Date: 10/14/2021  EXAMINATION: DUPLEX VENOUS ULTRASOUND OF THE BILATERAL LOWER [id number]/14/2021 6:16 pm TECHNIQUE: Duplex ultrasound using B-mode/gray scaled imaging, Doppler spectral analysis and color flow Doppler was obtained of the deep venous structures of the lower bilateral extremities. COMPARISON: None used HISTORY: ORDERING SYSTEM PROVIDED HISTORY: dvt TECHNOLOGIST PROVIDED HISTORY: Reason for exam:->dvt What reading provider will be dictating this exam?->CRC FINDINGS: The visualized veins of the bilateral lower extremities are patent and free of echogenic thrombus. The veins demonstrate good compressibility with normal color flow study and spectral analysis. No evidence of DVT in either lower extremity. IMPRESSION:  · Acute hepatitis  · Jaundice  · Hepatic steatosis, likely secondary to alcohol  · Elevated LFT's, most likely secondary to above, prior serology negative, no CBD dilatation on CT, medications reviewed  · Anemia, macrocytic, hyperchromic; occult stool negative   · Hyperammonemia  · Hepatic fibrosis, F1  · Cooper-Danlos disease   · Hx GERD, gastritis, small hiatal hernia   · MELD 28.02; DI 22.66  · Unintentional weight loss, 60#/1yr  · Hypokalemia- defer to PCP    RECOMMENDATIONS:    · Protonix 40 mg daily  · Lactulose to 20 g twice daily  · IV Solu-Medrol 20 mg daily  · IV fluids per PCP  · Diet as tolerated  · Monitor CBC, CMP, ammonia and INR daily  · Lipase pending  · Medical management per PCP  · Electrolyte replacement per PCP  · Supportive care  · Continue to monitor      Note: This report was completed utilizing computer voice recognition software. Every effort has been made to ensure accuracy, however; inadvertent computerized transcription errors may be present. Thank you very much for your consultation. We will follow closely with you.     Discussed with Dr. Emeline Lanes developed by Dr. Nolberto Mortimer, APRN-NP-C 10/21/2021 10:27 AM for Dr. Nirmal Tompkins

## 2021-10-22 LAB
ALBUMIN SERPL-MCNC: 2.8 G/DL (ref 3.5–5.2)
ALP BLD-CCNC: 223 U/L (ref 35–104)
ALT SERPL-CCNC: 40 U/L (ref 0–32)
AMMONIA: 49.3 UMOL/L (ref 11–51)
ANION GAP SERPL CALCULATED.3IONS-SCNC: 12 MMOL/L (ref 7–16)
AST SERPL-CCNC: 94 U/L (ref 0–31)
BASOPHILS ABSOLUTE: 0.04 E9/L (ref 0–0.2)
BASOPHILS RELATIVE PERCENT: 0.5 % (ref 0–2)
BILIRUB SERPL-MCNC: 19 MG/DL (ref 0–1.2)
BUN BLDV-MCNC: 3 MG/DL (ref 6–20)
CALCIUM SERPL-MCNC: 8.3 MG/DL (ref 8.6–10.2)
CHLORIDE BLD-SCNC: 100 MMOL/L (ref 98–107)
CO2: 23 MMOL/L (ref 22–29)
CREAT SERPL-MCNC: 0.4 MG/DL (ref 0.5–1)
EOSINOPHILS ABSOLUTE: 0.08 E9/L (ref 0.05–0.5)
EOSINOPHILS RELATIVE PERCENT: 1 % (ref 0–6)
GAMMA GLUTAMYL TRANSFERASE: 1197 U/L (ref 6–42)
GFR AFRICAN AMERICAN: >60
GFR NON-AFRICAN AMERICAN: >60 ML/MIN/1.73
GLUCOSE BLD-MCNC: 93 MG/DL (ref 74–99)
HCT VFR BLD CALC: 34.4 % (ref 34–48)
HEMOGLOBIN: 11.7 G/DL (ref 11.5–15.5)
IMMATURE GRANULOCYTES #: 0.1 E9/L
IMMATURE GRANULOCYTES %: 1.2 % (ref 0–5)
INR BLD: 1.1
LACTIC ACID: 2.1 MMOL/L (ref 0.5–2.2)
LYMPHOCYTES ABSOLUTE: 1.03 E9/L (ref 1.5–4)
LYMPHOCYTES RELATIVE PERCENT: 12.7 % (ref 20–42)
MAGNESIUM: 2.1 MG/DL (ref 1.6–2.6)
MCH RBC QN AUTO: 36.2 PG (ref 26–35)
MCHC RBC AUTO-ENTMCNC: 34 % (ref 32–34.5)
MCV RBC AUTO: 106.5 FL (ref 80–99.9)
MONOCYTES ABSOLUTE: 1 E9/L (ref 0.1–0.95)
MONOCYTES RELATIVE PERCENT: 12.3 % (ref 2–12)
NEUTROPHILS ABSOLUTE: 5.85 E9/L (ref 1.8–7.3)
NEUTROPHILS RELATIVE PERCENT: 72.3 % (ref 43–80)
PDW BLD-RTO: 15.4 FL (ref 11.5–15)
PLATELET # BLD: 276 E9/L (ref 130–450)
PMV BLD AUTO: 10.3 FL (ref 7–12)
POTASSIUM REFLEX MAGNESIUM: 3.6 MMOL/L (ref 3.5–5)
PROTHROMBIN TIME: 12.3 SEC (ref 9.3–12.4)
RBC # BLD: 3.23 E12/L (ref 3.5–5.5)
SODIUM BLD-SCNC: 135 MMOL/L (ref 132–146)
TOTAL PROTEIN: 4.7 G/DL (ref 6.4–8.3)
WBC # BLD: 8.1 E9/L (ref 4.5–11.5)

## 2021-10-22 PROCEDURE — 6360000002 HC RX W HCPCS: Performed by: INTERNAL MEDICINE

## 2021-10-22 PROCEDURE — 6370000000 HC RX 637 (ALT 250 FOR IP): Performed by: NURSE PRACTITIONER

## 2021-10-22 PROCEDURE — 36415 COLL VENOUS BLD VENIPUNCTURE: CPT

## 2021-10-22 PROCEDURE — 6370000000 HC RX 637 (ALT 250 FOR IP): Performed by: INTERNAL MEDICINE

## 2021-10-22 PROCEDURE — 83735 ASSAY OF MAGNESIUM: CPT

## 2021-10-22 PROCEDURE — 82140 ASSAY OF AMMONIA: CPT

## 2021-10-22 PROCEDURE — 82977 ASSAY OF GGT: CPT

## 2021-10-22 PROCEDURE — 80053 COMPREHEN METABOLIC PANEL: CPT

## 2021-10-22 PROCEDURE — 96376 TX/PRO/DX INJ SAME DRUG ADON: CPT

## 2021-10-22 PROCEDURE — 85610 PROTHROMBIN TIME: CPT

## 2021-10-22 PROCEDURE — 83605 ASSAY OF LACTIC ACID: CPT

## 2021-10-22 PROCEDURE — 2580000003 HC RX 258: Performed by: INTERNAL MEDICINE

## 2021-10-22 PROCEDURE — 85025 COMPLETE CBC W/AUTO DIFF WBC: CPT

## 2021-10-22 PROCEDURE — G0378 HOSPITAL OBSERVATION PER HR: HCPCS

## 2021-10-22 PROCEDURE — 6360000002 HC RX W HCPCS: Performed by: NURSE PRACTITIONER

## 2021-10-22 RX ORDER — FOLIC ACID 1 MG/1
1 TABLET ORAL DAILY
Status: DISCONTINUED | OUTPATIENT
Start: 2021-10-22 | End: 2021-10-23 | Stop reason: HOSPADM

## 2021-10-22 RX ADMIN — PANTOPRAZOLE SODIUM 40 MG: 40 TABLET, DELAYED RELEASE ORAL at 08:21

## 2021-10-22 RX ADMIN — LACTULOSE 20 G: 20 SOLUTION ORAL at 21:13

## 2021-10-22 RX ADMIN — Medication 100 MG: at 08:21

## 2021-10-22 RX ADMIN — PANTOPRAZOLE SODIUM 40 MG: 40 TABLET, DELAYED RELEASE ORAL at 16:11

## 2021-10-22 RX ADMIN — FOLIC ACID 1 MG: 1 TABLET ORAL at 08:21

## 2021-10-22 RX ADMIN — LACTULOSE 20 G: 20 SOLUTION ORAL at 08:21

## 2021-10-22 RX ADMIN — CEFTRIAXONE 1000 MG: 1 INJECTION, POWDER, FOR SOLUTION INTRAMUSCULAR; INTRAVENOUS at 21:13

## 2021-10-22 RX ADMIN — METHYLPREDNISOLONE SODIUM SUCCINATE 20 MG: 40 INJECTION, POWDER, LYOPHILIZED, FOR SOLUTION INTRAMUSCULAR; INTRAVENOUS at 08:21

## 2021-10-22 ASSESSMENT — ENCOUNTER SYMPTOMS
SHORTNESS OF BREATH: 0
COUGH: 0
NAUSEA: 0
DIARRHEA: 0
VOMITING: 0

## 2021-10-22 ASSESSMENT — PAIN SCALES - GENERAL
PAINLEVEL_OUTOF10: 0
PAINLEVEL_OUTOF10: 0

## 2021-10-22 NOTE — PROGRESS NOTES
Progress Note  Date:10/22/2021       JACQUELINE:[id number]  Patient Michael Robert     YOB: 1980     Age:41 y.o. Patient seen for follow up of acute alcoholic hepatitis. Patient this am states she is feeling ok. She keeps asking if she has any permanent damage to her liver. She denies any fevers, chills, cough, shortness of breath, chest pains, nausea, vomiting or diarrhea. Subjective    Subjective:  Symptoms:  No shortness of breath, cough, chest pain, headache, chest pressure or diarrhea. Diet:  No nausea or vomiting. Review of Systems   Respiratory: Negative for cough and shortness of breath. Cardiovascular: Negative for chest pain. Gastrointestinal: Negative for diarrhea, nausea and vomiting. Objective         Vitals Last 24 Hours:  TEMPERATURE:  Temp  Av.5 °F (36.9 °C)  Min: 98.1 °F (36.7 °C)  Max: 98.8 °F (37.1 °C)  RESPIRATIONS RANGE: Resp  Av  Min: 16  Max: 16  PULSE OXIMETRY RANGE: SpO2  Av.3 %  Min: 96 %  Max: 99 %  PULSE RANGE: Pulse  Av  Min: 87  Max: 96  BLOOD PRESSURE RANGE: Systolic (61ATD), YGD:737 , Min:96 , LISE:558   ; Diastolic (65QCU), FDW:27, Min:57, Max:78    I/O (24Hr): No intake or output data in the 24 hours ending 10/22/21 0639  Objective:  General Appearance:  Comfortable, well-appearing and in no acute distress. Vital signs: (most recent): Blood pressure 99/76, pulse 89, temperature 98.6 °F (37 °C), temperature source Oral, resp. rate 16, height 5' 2\" (1.575 m), weight 148 lb (67.1 kg), last menstrual period 10/02/2021, SpO2 99 %, not currently breastfeeding. Lungs:  Normal effort and normal respiratory rate. Breath sounds clear to auscultation. No rales or rhonchi. Heart: Normal rate. Regular rhythm. S1 normal and S2 normal.  No friction rub. Abdomen: Abdomen is soft and non-distended. Bowel sounds are normal.   There is no abdominal tenderness. There is no rebound tenderness. There is no guarding. normal contour. Adrenal glands appear unremarkable. There is symmetric enhancement of the kidneys with no hydronephrosis. There is a 6 mm nonobstructing right renal stone. GI/Bowel: No obstructing or constricting lesions. Appendix is normal. Pelvis: Bladder is suboptimally distended. The uterus is present. There is no significant free fluid. Peritoneum/Retroperitoneum: No free air or significant adenopathy. Bones/Soft Tissues: Unremarkable. Redemonstration of enlarged fatty liver. There is no evidence of biliary ductal dilatation. Please note jaundice may be caused by steatohepatitis. 6 mm nonobstructing right renal stone. US GALLBLADDER RUQ    Result Date: 10/20/2021  EXAMINATION: RIGHT UPPER QUADRANT ULTRASOUND 10/20/2021 7:12 pm COMPARISON: None. HISTORY: ORDERING SYSTEM PROVIDED HISTORY: liver failure TECHNOLOGIST PROVIDED HISTORY: Reason for exam:->liver failure What reading provider will be dictating this exam?->CRC FINDINGS: LIVER:  The liver is enlarged with increased echogenicity without evidence of intrahepatic biliary ductal dilatation. BILIARY SYSTEM:  Gallbladder is suboptimally distended without evidence of pericholecystic fluid, or stones. The gallbladder wall measures up to 5.8 mm. Negative sonographic Whittaker's sign. Common bile duct is within normal limits measuring 5.3 mm. RIGHT KIDNEY: The right kidney is grossly unremarkable without evidence of hydronephrosis. PANCREAS:  Visualized portions of the pancreas are unremarkable. OTHER: No evidence of right upper quadrant ascites. Enlarged fatty liver. Mild thickening of the gallbladder wall, with no evidence of gallstones or pericholecystic fluid collection. Assessment//Plan           Hospital Problems         Last Modified POA    Hyperbilirubinemia 10/21/2021 Yes        Assessment & Plan  1. Alcoholic Hepatitis  2. Hyperbilirubinemia  3. Lactic acidosis   4. Recent Alcohol Cessation   5. Hypokalemia  6.  Macrocytic anemia - multifactorial liver disease and folic acid deficiency. 7. UTI        Appreciate GI input. Continue on steroids per their recommendation. Await am labs. Continue to replace electrolytes. Add folic acid supplement.      Kaykay Sweeney DO    Electronically signed by Kaykay Sweeney DO on 10/22/21 at 6:39 AM EDT

## 2021-10-22 NOTE — PLAN OF CARE
Problem: Falls - Risk of:  Goal: Will remain free from falls  Description: Will remain free from falls  10/22/2021 0050 by Binta Key RN  Outcome: Met This Shift  10/21/2021 1843 by Matias Tomlinson RN  Outcome: Met This Shift  Goal: Absence of physical injury  Description: Absence of physical injury  10/22/2021 0050 by Binta Key RN  Outcome: Met This Shift  10/21/2021 1843 by Matias Tomlinson RN  Outcome: Met This Shift     Problem: Pain:  Goal: Pain level will decrease  Description: Pain level will decrease  Outcome: Met This Shift  Goal: Control of acute pain  Description: Control of acute pain  Outcome: Met This Shift     Problem: Pain:  Goal: Control of chronic pain  Description: Control of chronic pain  Outcome: Ongoing

## 2021-10-22 NOTE — CARE COORDINATION
Met with patient about diagnosis and discharge plan of care for hyperbilirubinemia. Currently on iv fluids, trending labs. Recent admit for same diagnosis. Alcohol related and follows at [facility].. Pt lives with spouse in 2 story home, independent prior to admit. PCP is Dr. Pao Gonzalez.  Plan is home with no needs-o

## 2021-10-23 VITALS
OXYGEN SATURATION: 98 % | HEART RATE: 89 BPM | RESPIRATION RATE: 18 BRPM | SYSTOLIC BLOOD PRESSURE: 108 MMHG | BODY MASS INDEX: 27.23 KG/M2 | WEIGHT: 148 LBS | HEIGHT: 62 IN | TEMPERATURE: 97.9 F | DIASTOLIC BLOOD PRESSURE: 69 MMHG

## 2021-10-23 PROBLEM — K70.10 ACUTE ALCOHOLIC HEPATITIS: Status: ACTIVE | Noted: 2021-10-23

## 2021-10-23 LAB
ALBUMIN SERPL-MCNC: 2.4 G/DL (ref 3.5–5.2)
ALP BLD-CCNC: 197 U/L (ref 35–104)
ALT SERPL-CCNC: 38 U/L (ref 0–32)
AMMONIA: 49.3 UMOL/L (ref 11–51)
ANION GAP SERPL CALCULATED.3IONS-SCNC: 11 MMOL/L (ref 7–16)
AST SERPL-CCNC: 87 U/L (ref 0–31)
BASOPHILS ABSOLUTE: 0.03 E9/L (ref 0–0.2)
BASOPHILS RELATIVE PERCENT: 0.4 % (ref 0–2)
BILIRUB SERPL-MCNC: 16.5 MG/DL (ref 0–1.2)
BUN BLDV-MCNC: 4 MG/DL (ref 6–20)
CALCIUM SERPL-MCNC: 8.2 MG/DL (ref 8.6–10.2)
CHLORIDE BLD-SCNC: 100 MMOL/L (ref 98–107)
CO2: 24 MMOL/L (ref 22–29)
CREAT SERPL-MCNC: 0.4 MG/DL (ref 0.5–1)
EOSINOPHILS ABSOLUTE: 0.07 E9/L (ref 0.05–0.5)
EOSINOPHILS RELATIVE PERCENT: 0.8 % (ref 0–6)
GFR AFRICAN AMERICAN: >60
GFR NON-AFRICAN AMERICAN: >60 ML/MIN/1.73
GLUCOSE BLD-MCNC: 105 MG/DL (ref 74–99)
HCT VFR BLD CALC: 31.3 % (ref 34–48)
HEMOGLOBIN: 10.6 G/DL (ref 11.5–15.5)
IMMATURE GRANULOCYTES #: 0.13 E9/L
IMMATURE GRANULOCYTES %: 1.6 % (ref 0–5)
INR BLD: 1.2
LYMPHOCYTES ABSOLUTE: 1.1 E9/L (ref 1.5–4)
LYMPHOCYTES RELATIVE PERCENT: 13.3 % (ref 20–42)
MAGNESIUM: 2 MG/DL (ref 1.6–2.6)
MCH RBC QN AUTO: 35.9 PG (ref 26–35)
MCHC RBC AUTO-ENTMCNC: 33.9 % (ref 32–34.5)
MCV RBC AUTO: 106.1 FL (ref 80–99.9)
MONOCYTES ABSOLUTE: 0.9 E9/L (ref 0.1–0.95)
MONOCYTES RELATIVE PERCENT: 10.8 % (ref 2–12)
NEUTROPHILS ABSOLUTE: 6.07 E9/L (ref 1.8–7.3)
NEUTROPHILS RELATIVE PERCENT: 73.1 % (ref 43–80)
ORGANISM: ABNORMAL
PDW BLD-RTO: 15.5 FL (ref 11.5–15)
PLATELET # BLD: 254 E9/L (ref 130–450)
PMV BLD AUTO: 10.2 FL (ref 7–12)
POTASSIUM REFLEX MAGNESIUM: 3 MMOL/L (ref 3.5–5)
PROTHROMBIN TIME: 12.9 SEC (ref 9.3–12.4)
RBC # BLD: 2.95 E12/L (ref 3.5–5.5)
SODIUM BLD-SCNC: 135 MMOL/L (ref 132–146)
TOTAL PROTEIN: 4.4 G/DL (ref 6.4–8.3)
URINE CULTURE, ROUTINE: ABNORMAL
WBC # BLD: 8.3 E9/L (ref 4.5–11.5)

## 2021-10-23 PROCEDURE — 80053 COMPREHEN METABOLIC PANEL: CPT

## 2021-10-23 PROCEDURE — G0378 HOSPITAL OBSERVATION PER HR: HCPCS

## 2021-10-23 PROCEDURE — 85025 COMPLETE CBC W/AUTO DIFF WBC: CPT

## 2021-10-23 PROCEDURE — 85610 PROTHROMBIN TIME: CPT

## 2021-10-23 PROCEDURE — 36415 COLL VENOUS BLD VENIPUNCTURE: CPT

## 2021-10-23 PROCEDURE — 96376 TX/PRO/DX INJ SAME DRUG ADON: CPT

## 2021-10-23 PROCEDURE — 2580000003 HC RX 258: Performed by: INTERNAL MEDICINE

## 2021-10-23 PROCEDURE — 83735 ASSAY OF MAGNESIUM: CPT

## 2021-10-23 PROCEDURE — 6370000000 HC RX 637 (ALT 250 FOR IP): Performed by: INTERNAL MEDICINE

## 2021-10-23 PROCEDURE — 6370000000 HC RX 637 (ALT 250 FOR IP): Performed by: NURSE PRACTITIONER

## 2021-10-23 PROCEDURE — 6360000002 HC RX W HCPCS: Performed by: NURSE PRACTITIONER

## 2021-10-23 PROCEDURE — 82140 ASSAY OF AMMONIA: CPT

## 2021-10-23 PROCEDURE — 1200000000 HC SEMI PRIVATE

## 2021-10-23 PROCEDURE — 6360000002 HC RX W HCPCS: Performed by: INTERNAL MEDICINE

## 2021-10-23 RX ORDER — NITROFURANTOIN 25; 75 MG/1; MG/1
100 CAPSULE ORAL EVERY 12 HOURS SCHEDULED
Qty: 10 CAPSULE | Refills: 0 | Status: SHIPPED | OUTPATIENT
Start: 2021-10-23 | End: 2021-10-28

## 2021-10-23 RX ORDER — FOLIC ACID 1 MG/1
1 TABLET ORAL DAILY
Qty: 30 TABLET | Refills: 3 | Status: SHIPPED | OUTPATIENT
Start: 2021-10-24 | End: 2022-04-08 | Stop reason: ALTCHOICE

## 2021-10-23 RX ORDER — LACTULOSE 10 G/15ML
20 SOLUTION ORAL 2 TIMES DAILY
Qty: 1 EACH | Refills: 1 | Status: SHIPPED | OUTPATIENT
Start: 2021-10-23 | End: 2022-04-08 | Stop reason: ALTCHOICE

## 2021-10-23 RX ORDER — NITROFURANTOIN 25; 75 MG/1; MG/1
100 CAPSULE ORAL EVERY 12 HOURS SCHEDULED
Status: DISCONTINUED | OUTPATIENT
Start: 2021-10-23 | End: 2021-10-23 | Stop reason: HOSPADM

## 2021-10-23 RX ORDER — POTASSIUM CHLORIDE 20 MEQ/1
20 TABLET, EXTENDED RELEASE ORAL DAILY
Qty: 60 TABLET | Refills: 0 | Status: SHIPPED | OUTPATIENT
Start: 2021-10-23 | End: 2022-04-08 | Stop reason: ALTCHOICE

## 2021-10-23 RX ORDER — PREDNISONE 10 MG/1
10 TABLET ORAL DAILY
Qty: 15 TABLET | Refills: 0 | Status: SHIPPED | OUTPATIENT
Start: 2021-10-23 | End: 2021-11-02

## 2021-10-23 RX ORDER — NICOTINE 21 MG/24HR
1 PATCH, TRANSDERMAL 24 HOURS TRANSDERMAL DAILY
Qty: 30 PATCH | Refills: 3 | Status: SHIPPED | OUTPATIENT
Start: 2021-10-24 | End: 2022-04-08 | Stop reason: ALTCHOICE

## 2021-10-23 RX ADMIN — Medication 100 MG: at 08:25

## 2021-10-23 RX ADMIN — POTASSIUM CHLORIDE 10 MEQ: 7.46 INJECTION, SOLUTION INTRAVENOUS at 14:17

## 2021-10-23 RX ADMIN — LACTULOSE 20 G: 20 SOLUTION ORAL at 08:21

## 2021-10-23 RX ADMIN — POTASSIUM CHLORIDE 10 MEQ: 7.46 INJECTION, SOLUTION INTRAVENOUS at 11:27

## 2021-10-23 RX ADMIN — SODIUM CHLORIDE: 9 INJECTION, SOLUTION INTRAVENOUS at 08:19

## 2021-10-23 RX ADMIN — PANTOPRAZOLE SODIUM 40 MG: 40 TABLET, DELAYED RELEASE ORAL at 06:28

## 2021-10-23 RX ADMIN — POTASSIUM CHLORIDE 10 MEQ: 7.46 INJECTION, SOLUTION INTRAVENOUS at 08:22

## 2021-10-23 RX ADMIN — POTASSIUM CHLORIDE 10 MEQ: 7.46 INJECTION, SOLUTION INTRAVENOUS at 10:17

## 2021-10-23 RX ADMIN — POTASSIUM CHLORIDE 10 MEQ: 7.46 INJECTION, SOLUTION INTRAVENOUS at 13:12

## 2021-10-23 RX ADMIN — NITROFURANTOIN MONOHYDRATE/MACROCRYSTALLINE 100 MG: 25; 75 CAPSULE ORAL at 11:28

## 2021-10-23 RX ADMIN — POTASSIUM CHLORIDE 10 MEQ: 7.46 INJECTION, SOLUTION INTRAVENOUS at 12:17

## 2021-10-23 RX ADMIN — METHYLPREDNISOLONE SODIUM SUCCINATE 20 MG: 40 INJECTION, POWDER, LYOPHILIZED, FOR SOLUTION INTRAMUSCULAR; INTRAVENOUS at 08:20

## 2021-10-23 RX ADMIN — FOLIC ACID 1 MG: 1 TABLET ORAL at 08:24

## 2021-10-23 ASSESSMENT — ENCOUNTER SYMPTOMS
SHORTNESS OF BREATH: 0
DIARRHEA: 0
NAUSEA: 0
VOMITING: 0
COUGH: 0

## 2021-10-23 ASSESSMENT — PAIN SCALES - GENERAL: PAINLEVEL_OUTOF10: 0

## 2021-10-23 NOTE — PLAN OF CARE
Problem: Falls - Risk of:  Goal: Will remain free from falls  Description: Will remain free from falls  10/22/2021 2253 by Brandy Lacy RN  Outcome: Ongoing  10/22/2021 1944 by Alisia Aggarwal RN  Outcome: Met This Shift  Goal: Absence of physical injury  Description: Absence of physical injury  10/22/2021 2253 by Brandy Lacy RN  Outcome: Ongoing  10/22/2021 1944 by Alisia Aggarwal RN  Outcome: Met This Shift     Problem: Pain:  Goal: Pain level will decrease  Description: Pain level will decrease  Outcome: Ongoing  Goal: Control of acute pain  Description: Control of acute pain  Outcome: Ongoing  Goal: Control of chronic pain  Description: Control of chronic pain  Outcome: Ongoing

## 2021-10-23 NOTE — PROGRESS NOTES
PROGRESS NOTE        Patient Presents with/Seen in Consultation For      *acute liver failure bili 22  CHIEF COMPLAINT:  Jaundice and abdominal pain    Subjective:     Patient states shes anxious and wants to go home. Denies abdominal pain, nausea or vomiting. Tolerating diet. With multiple BM no melena or hematochezia. Review of Systems  Aside from what was mentioned in the PMH and HPI, essentially unremarkable, all others negative. Objective:     /69   Pulse 89   Temp 97.9 °F (36.6 °C) (Oral)   Resp 18   Ht 5' 2\" (1.575 m)   Wt 148 lb (67.1 kg)   LMP 10/02/2021   SpO2 98%   BMI 27.07 kg/m²     General appearance: alert, awake, icteric, sitting up in bed in no apparent distress  at bedside, and cooperative  Eyes: conjunctiva pink, sclera icteric. PERRL.   Lungs: clear to auscultation bilaterally  Heart: regular rate and rhythm, no murmur, 2+ pulses; 2+ BLE edema  Abdomen: soft, non-tender; bowel sounds normal; no masses,  no organomegaly  Extremities: extremities with 2+ BLE edema  Pulses: 2+ and symmetric  Skin: Skin color jaundiced, texture, turgor normal.   Neurologic: Grossly normal    folic acid (FOLVITE) tablet 1 mg, Daily  sodium chloride flush 0.9 % injection 10 mL, 2 times per day  sodium chloride flush 0.9 % injection 10 mL, PRN  0.9 % sodium chloride infusion, PRN  potassium chloride (KLOR-CON M) extended release tablet 40 mEq, PRN   Or  potassium bicarb-citric acid (EFFER-K) effervescent tablet 40 mEq, PRN   Or  potassium chloride 10 mEq/100 mL IVPB (Peripheral Line), PRN  senna (SENOKOT) tablet 8.6 mg, Daily PRN  0.9 % sodium chloride infusion, Continuous  cefTRIAXone (ROCEPHIN) 1,000 mg in sterile water 10 mL IV syringe, Q24H  lactulose (CHRONULAC) 10 GM/15ML solution 20 g, BID  methylPREDNISolone sodium (SOLU-MEDROL) injection 20 mg, Daily  thiamine tablet 100 mg, Daily  nicotine (NICODERM CQ) 21 MG/24HR 1 patch, Daily  pantoprazole (PROTONIX) tablet 40 mg, BID AC  fentaNYL while inpatient will order taper dose of prednisone 20 MG daily X 5 days, 10 MG daily X 5 days then stop   · IV fluids as ordered per PCP  · Diet as tolerated  · Monitor CBC, CMP, ammonia and INR daily  · Medical management per PCP  · Potassium management per PCP  · Supportive Care  · Continue to monitor  · Discharge per PCP, ok from GI POV with outpatient follow up visit. Currently has follow up appt scheduled for this Monday 10/25/21. Labs printed and to be given to patient upon discharge. Note: This report was completed utilizing computer voice recognition software. Every effort has been made to ensure accuracy, however; inadvertent computerized transcription errors may be present.      Discussed with Dr. Juan Urbano per Dr. Nemesio Navarro APRN-NP-C 10/23/2021  10:04 AM For Dr. Anyi Hernandez

## 2021-10-23 NOTE — PLAN OF CARE
Problem: Falls - Risk of:  Goal: Will remain free from falls  Description: Will remain free from falls  10/23/2021 0854 by Lyla Felder RN  Outcome: Met This Shift     Problem: Falls - Risk of:  Goal: Absence of physical injury  Description: Absence of physical injury  10/23/2021 0854 by Lyla Felder RN  Outcome: Met This Shift     Problem: Pain:  Goal: Pain level will decrease  Description: Pain level will decrease  10/23/2021 0854 by Lyla Felder RN  Outcome: Met This Shift

## 2021-10-23 NOTE — PROGRESS NOTES
Progress Note  Date:10/23/2021       [id number]  Patient Jessica Jiménez     YOB: 1980     Age:41 y.o. Patient seen for follow up of acute alcoholic hepatitis. Patient this am states she is feeling ok. She denies any fevers, chills, cough, shortness of breath, chest pains, nausea, vomiting or diarrhea. She is hoping to discharge today. She has an appointment scheduled with Dr. Jessica Rodríguez on Monday. Subjective    Subjective:  Symptoms:  No shortness of breath, cough, chest pain, headache, chest pressure or diarrhea. Diet:  No nausea or vomiting. Review of Systems   Respiratory: Negative for cough and shortness of breath. Cardiovascular: Negative for chest pain. Gastrointestinal: Negative for diarrhea, nausea and vomiting. Objective         Vitals Last 24 Hours:  TEMPERATURE:  Temp  Av.1 °F (36.7 °C)  Min: 97.9 °F (36.6 °C)  Max: 98.2 °F (36.8 °C)  RESPIRATIONS RANGE: Resp  Av  Min: 16  Max: 18  PULSE OXIMETRY RANGE: SpO2  Av.5 %  Min: 98 %  Max: 99 %  PULSE RANGE: Pulse  Av.5  Min: 86  Max: 89  BLOOD PRESSURE RANGE: Systolic (66FGD), LZH:320 , Min:108 , UOB:689   ; Diastolic (12BPG), SWI:68, Min:69, Max:75    I/O (24Hr): Intake/Output Summary (Last 24 hours) at 10/23/2021 0941  Last data filed at 10/23/2021 6797  Gross per 24 hour   Intake 3181.3 ml   Output    Net 3181.3 ml     Objective:  General Appearance:  Comfortable, well-appearing and in no acute distress. Vital signs: (most recent): Blood pressure 108/69, pulse 89, temperature 97.9 °F (36.6 °C), temperature source Oral, resp. rate 18, height 5' 2\" (1.575 m), weight 148 lb (67.1 kg), last menstrual period 10/02/2021, SpO2 98 %, not currently breastfeeding. Lungs:  Normal effort and normal respiratory rate. Breath sounds clear to auscultation. No rales or rhonchi. Heart: Normal rate. Regular rhythm. S1 normal and S2 normal.  No friction rub.    Abdomen: Abdomen is soft and non-distended. Bowel sounds are normal.   There is no abdominal tenderness. There is no rebound tenderness. There is no guarding. Extremities: Normal range of motion. There is no dependent edema. Neurological: Patient is alert. Patient has normal reflexes. Skin:  Warm and dry. (Jaundiced. )    Labs/Imaging/Diagnostics    Labs:  CBC:  Recent Labs     10/21/21  0500 10/22/21  0625 10/23/21  0542   WBC 7.7 8.1 8.3   RBC 3.05* 3.23* 2.95*   HGB 11.1* 11.7 10.6*   HCT 32.1* 34.4 31.3*   .2* 106.5* 106.1*   RDW 15.4* 15.4* 15.5*    276 254     CHEMISTRIES:  Recent Labs     10/21/21  0500 10/22/21  0625 10/23/21  0542    135 135   K 3.3* 3.6 3.0*   CL 98 100 100   CO2 24 23 24   BUN 3* 3* 4*   CREATININE 0.4* 0.4* 0.4*   GLUCOSE 81 93 105*   MG 2.3 2.1 2.0     PT/INR:  Recent Labs     10/21/21  0500 10/22/21  0625 10/23/21  0542   PROTIME 13.5* 12.3 12.9*   INR 1.2 1.1 1.2     APTT:  Recent Labs     10/20/21  2020   APTT 30.3     LIVER PROFILE:  Recent Labs     10/21/21  0500 10/22/21  0625 10/23/21  0542   AST 75* 94* 87*   ALT 33* 40* 38*   BILITOT 17.6* 19.0* 16.5*   ALKPHOS 206* 223* 197*       Imaging Last 24 Hours:  CT ABDOMEN PELVIS W IV CONTRAST Additional Contrast? None    Result Date: 10/20/2021  EXAMINATION: CT OF THE ABDOMEN AND PELVIS WITH CONTRAST 10/20/2021 9:19 pm TECHNIQUE: CT of the abdomen and pelvis was performed with the administration of intravenous contrast. Multiplanar reformatted images are provided for review. Dose modulation, iterative reconstruction, and/or weight based adjustment of the mA/kV was utilized to reduce the radiation dose to as low as reasonably achievable.  COMPARISON: October 14, 2021 HISTORY: ORDERING SYSTEM PROVIDED HISTORY: jaundice, abdominal pain, acute liver failure TECHNOLOGIST PROVIDED HISTORY: Additional Contrast?->None Reason for exam:->jaundice, abdominal pain, acute liver failure Decision Support Exception - unselect if not a suspected or confirmed emergency medical condition->Emergency Medical Condition (MA) FINDINGS: Lower Chest: No infiltrates or pleural effusion. Organs: Enlarged fatty liver with no focal lesions or biliary ductal dilatation. Gallbladder is present. Spleen is not enlarged. Pancreas demonstrates normal contour. Adrenal glands appear unremarkable. There is symmetric enhancement of the kidneys with no hydronephrosis. There is a 6 mm nonobstructing right renal stone. GI/Bowel: No obstructing or constricting lesions. Appendix is normal. Pelvis: Bladder is suboptimally distended. The uterus is present. There is no significant free fluid. Peritoneum/Retroperitoneum: No free air or significant adenopathy. Bones/Soft Tissues: Unremarkable. Redemonstration of enlarged fatty liver. There is no evidence of biliary ductal dilatation. Please note jaundice may be caused by steatohepatitis. 6 mm nonobstructing right renal stone. US GALLBLADDER RUQ    Result Date: 10/20/2021  EXAMINATION: RIGHT UPPER QUADRANT ULTRASOUND 10/20/2021 7:12 pm COMPARISON: None. HISTORY: ORDERING SYSTEM PROVIDED HISTORY: liver failure TECHNOLOGIST PROVIDED HISTORY: Reason for exam:->liver failure What reading provider will be dictating this exam?->CRC FINDINGS: LIVER:  The liver is enlarged with increased echogenicity without evidence of intrahepatic biliary ductal dilatation. BILIARY SYSTEM:  Gallbladder is suboptimally distended without evidence of pericholecystic fluid, or stones. The gallbladder wall measures up to 5.8 mm. Negative sonographic Whittaker's sign. Common bile duct is within normal limits measuring 5.3 mm. RIGHT KIDNEY: The right kidney is grossly unremarkable without evidence of hydronephrosis. PANCREAS:  Visualized portions of the pancreas are unremarkable. OTHER: No evidence of right upper quadrant ascites. Enlarged fatty liver.  Mild thickening of the gallbladder wall, with no evidence of gallstones or

## 2021-10-23 NOTE — PROGRESS NOTES
Patient is very upset that she is not discharged yet. Educated patient on awaited lab results and need to improve K level.  Per patient if she is not discharged by 3:00pm she will leave AMA

## 2021-10-25 NOTE — DISCHARGE SUMMARY
Discharge Summary     Patient ID:  Sharee Guallpa  [id number]  62 y.o. 1980 female  Francisca Monsivais,         Admit date: 10/20/2021    Discharge date : 10/23/2021     Activity level:As tolerated  Diet: Low fat  Labs:CMP, Ammonia and CBC   Disposition:Home  Condition on Discharge:Stable  DME: None    Admit Diagnoses:   Patient Active Problem List   Diagnosis    Acute liver failure without hepatic coma    Hyperbilirubinemia    Acute alcoholic hepatitis       Discharge Diagnoses: Active Problems:    Hyperbilirubinemia    Acute alcoholic hepatitis  Resolved Problems:    * No resolved hospital problems. *    1. Alcoholic Hepatitis  2. Hyperbilirubinemia  3. Lactic acidosis   4. Recent Alcohol Cessation   5. Hypokalemia  6. Macrocytic anemia - multifactorial liver disease and folic acid deficiency. 7. UTI     Consults:  IP CONSULT TO GI  IP CONSULT TO INTERNAL MEDICINE    Procedures: None     Hospital Course: Patient is a 42year old who presented to ER with worsening Jaundice and RUQ pain. Patient was discharged on 10/16/2021 after stay for acute alcoholic hepatitis. She has been off of alcohol for 8 days. She states that her yellowing of her eyes and skin have worsened. She had outpatient labs done at her hospital follow up visit and bilirubin went from 11 to 20.5. Patient denies using any acetaminophen. She has had no fevers, chills, or sick contacts. She has noticed her urine went from a clear color to a orange discolored color since discharge. She was seen by GI. She was started on ceftriaxone. She was transitioned to oral macrobid on discharge. Patient had IVF hydration. Her bilirubin improved. She was discharged to home to have close follow up with GI. Discharge Exam:  General Appearance:  Comfortable, well-appearing and in no acute distress. Vital signs: (most recent): Blood pressure 108/69, pulse 89, temperature 97.9 °F (36.6 °C), temperature source Oral, resp.  rate 18, height 5' 2\" (1.575 m), weight 148 lb (67.1 kg), last menstrual period 10/02/2021, SpO2 98 %, not currently breastfeeding. Lungs:  Normal effort and normal respiratory rate. Breath sounds clear to auscultation. No rales or rhonchi. Heart: Normal rate. Regular rhythm. S1 normal and S2 normal.  No friction rub. Abdomen: Abdomen is soft and non-distended. Bowel sounds are normal.   There is no abdominal tenderness. There is no rebound tenderness. There is no guarding. Extremities: Normal range of motion. There is no dependent edema. Neurological: Patient is alert. Patient has normal reflexes. Skin:  Warm and dry. (Jaundiced. )    I/O last 3 completed shifts: In: 3181.3 [P.O.:360; I.V.:2821.3]  Out: -   I/O this shift:  In: 3181.3 [P.O.:360; I.V.:2821.3]  Out: -       LABS:  Recent Labs     10/22/21  0625 10/23/21  0542    135   K 3.6 3.0*    100   CO2 23 24   BUN 3* 4*   CREATININE 0.4* 0.4*   GLUCOSE 93 105*   CALCIUM 8.3* 8.2*       Recent Labs     10/22/21  0625 10/23/21  0542   WBC 8.1 8.3   RBC 3.23* 2.95*   HGB 11.7 10.6*   HCT 34.4 31.3*   .5* 106.1*   MCH 36.2* 35.9*   MCHC 34.0 33.9   RDW 15.4* 15.5*    254   MPV 10.3 10.2       No results for input(s): GLUMET in the last 72 hours. Imaging:  CT ABDOMEN PELVIS W IV CONTRAST Additional Contrast? None    Result Date: 10/20/2021  EXAMINATION: CT OF THE ABDOMEN AND PELVIS WITH CONTRAST 10/20/2021 9:19 pm TECHNIQUE: CT of the abdomen and pelvis was performed with the administration of intravenous contrast. Multiplanar reformatted images are provided for review. Dose modulation, iterative reconstruction, and/or weight based adjustment of the mA/kV was utilized to reduce the radiation dose to as low as reasonably achievable.  COMPARISON: October 14, 2021 HISTORY: ORDERING SYSTEM PROVIDED HISTORY: jaundice, abdominal pain, acute liver failure TECHNOLOGIST PROVIDED HISTORY: Additional Contrast?->None Reason for exam:->jaundice, abdominal pain, acute liver failure Decision Support Exception - unselect if not a suspected or confirmed emergency medical condition->Emergency Medical Condition (MA) FINDINGS: Lower Chest: No infiltrates or pleural effusion. Organs: Enlarged fatty liver with no focal lesions or biliary ductal dilatation. Gallbladder is present. Spleen is not enlarged. Pancreas demonstrates normal contour. Adrenal glands appear unremarkable. There is symmetric enhancement of the kidneys with no hydronephrosis. There is a 6 mm nonobstructing right renal stone. GI/Bowel: No obstructing or constricting lesions. Appendix is normal. Pelvis: Bladder is suboptimally distended. The uterus is present. There is no significant free fluid. Peritoneum/Retroperitoneum: No free air or significant adenopathy. Bones/Soft Tissues: Unremarkable. Redemonstration of enlarged fatty liver. There is no evidence of biliary ductal dilatation. Please note jaundice may be caused by steatohepatitis. 6 mm nonobstructing right renal stone. US GALLBLADDER RUQ    Result Date: 10/20/2021  EXAMINATION: RIGHT UPPER QUADRANT ULTRASOUND 10/20/2021 7:12 pm COMPARISON: None. HISTORY: ORDERING SYSTEM PROVIDED HISTORY: liver failure TECHNOLOGIST PROVIDED HISTORY: Reason for exam:->liver failure What reading provider will be dictating this exam?->CRC FINDINGS: LIVER:  The liver is enlarged with increased echogenicity without evidence of intrahepatic biliary ductal dilatation. BILIARY SYSTEM:  Gallbladder is suboptimally distended without evidence of pericholecystic fluid, or stones. The gallbladder wall measures up to 5.8 mm. Negative sonographic Whittaker's sign. Common bile duct is within normal limits measuring 5.3 mm. RIGHT KIDNEY: The right kidney is grossly unremarkable without evidence of hydronephrosis. PANCREAS:  Visualized portions of the pancreas are unremarkable. OTHER: No evidence of right upper quadrant ascites. Enlarged fatty liver.  Mild thickening of the gallbladder wall, with no evidence of gallstones or pericholecystic fluid collection.        Patient Instructions:   Discharge Medication List as of 10/23/2021  3:18 PM      START taking these medications    Details   predniSONE (DELTASONE) 10 MG tablet Take 1 tablet by mouth daily for 10 days Take 2 tablets (20MG) by mouth daily for 5 days, then take 1 tablet (10MG) by mouth daily for 5 days then stop., Disp-15 tablet, [id number]      folic acid (FOLVITE) 1 MG tablet Take 1 tablet by mouth daily, Disp-30 tablet, R-3Normal      potassium chloride (KLOR-CON M) 20 MEQ extended release tablet Take 1 tablet by mouth daily, Disp-60 tablet, R-0Normal      nitrofurantoin, macrocrystal-monohydrate, (MACROBID) 100 MG capsule Take 1 capsule by mouth every 12 hours for 10 doses, Disp-10 capsule, R-0Normal      nicotine (NICODERM CQ) 21 MG/24HR Place 1 patch onto the skin daily, Disp-30 patch, R-3Normal         CONTINUE these medications which have CHANGED    Details   lactulose (CHRONULAC) 10 GM/15ML solution Take 30 mLs by mouth 2 times daily, Disp-1 each, R-1Normal         CONTINUE these medications which have NOT CHANGED    Details   famotidine (PEPCID) 20 MG tablet Take 40 mg by mouth dailyHistorical Med      thiamine 100 MG tablet Take 1 tablet by mouth daily, Disp-30 tablet, R-3Normal      esomeprazole (NEXIUM) 20 MG delayed release capsule Take 20 mg by mouth daily Historical Med               Note that more than 30 minutes was spent in preparing discharge papers, discussing discharge with patient, medication review, etc.      Signed:    Geoffrey Bertrand DO     Electronically signed by Geoffrey Bertrand DO on 10/25/2021 at 5:54 AM

## 2021-10-29 NOTE — ADT AUTH CERT
atient Demographics    Name Patient ID SSN Gender Identity Birth Date   Waldemar Joshi [id number]  Female [birth year] (41 yrs)   Address Phone Email Employer    [employer]yd [employer] [phone] () [phone] [email or web address] [facility] Race Occupation Emp Status     White (non-)  Full Time    Reg Status PCP Date Last Verified Next Review Date    Verified Salvador Sandhu DO  [phone] 10/14/21 11/13/21    Admission Date Discharge Date Admitting Provider     10/20/21 10/23/21 Salvador Sandhu DO     Marital Status Episcopal       None      Emergency Contact 1 Emergency Contact 2 Emergency Contact 3 Emergency Contact 4   Micah Michaud [phone] [facility]   [phone] (R)   [phone] [facility] [phone] 609 [id number] [place] [address]   [phone] ()   [phone] ([address] [place] [address]) Jeff Montiel (C)   [phone] (H) Uriah Ray (0)   [phone] (H)   Subscriber Details  Hospital Account [de-identified]  CVG Subscriber Name/Sex/Relation Subscriber  Subscriber Address/Phone Subscriber Emp/Emp Phone   1. Chong [address]   [id number] [address] - Female   (Self) 1980 [facility] [address] [facility]   [phone](P) OTHER    2.  Barnie Card   [id number] Je Countess - Male   (Spouse) [address] [facility] [address]   [phone](G) OTHER    Utilization Reviews       Liver Disease Complications - Care Day 4 (10/23/2021) by Giuliano Dominguez RN       Review Status Review Entered   Completed 10/29/2021 09:14      Criteria Review      Care Day: 4 Care Date: 10/23/2021 Level of Care: Inpatient Floor    Guideline Day 2    Level Of Care    (X) Floor    Clinical Status    (X) * Hypoxemia absent    (X) * Hemodynamic stability    (X) * Ascites absent, stable, or improved    (X) * Mental status at baseline or improved    Activity    (X) Activity as tolerated    Routes    (X) * Clear liquid diet    (X) IV fluids and medication    * Milestone   Additional Notes   10/23/21      /69   Pulse 89   Temp 97.9 °F (36.6 °C) (Oral)   Resp 18   SpO2 98%   BMI 27.07 kg/m²       lab   Sodium: 135   Potassium: 3.0 (L)   Chloride: 100   CO2: 24   BUN: 4 (L)   Creatinine: 0.4 (L)   Anion Gap: 11   GFR Non-: >60   GFR African American: >60   Magnesium: 2.0   Glucose: 105 (H)   Calcium: 8.2 (L)   Total Protein: 4.4 (L)   Albumin: 2.4 (L)   Alk Phos: 197 (H)   ALT: 38 (H)   Ammonia: 49.3   AST: 87 (H)   Bilirubin: 16.5 (H)   WBC: 8.3   RBC: 2.95 (L)   Hemoglobin Quant: 10.6 (L)   Hematocrit: 31.3 (L)   MCV: 106.1 (H)   MCH: 35.9 (H)   MCHC: 33.9   MPV: 10.2   RDW: 15.5 (H)   Platelet Count: 052   Neutrophils %: 73.1   Immature Granulocytes %: 1.6   Lymphocyte %: 13.3 (L)   Monocytes %: 10.8   Eosinophils %: 0.8   Basophils %: 0.4   Neutrophils Absolute: 6.07   Immature Granulocytes #: 0.13   Lymphocytes Absolute: 1.10 (L)   Monocytes Absolute: 0.90   Eosinophils Absolute: 0.07   Basophils Absolute: 0.03   Prothrombin Time: 12.9 (H)   INR: 1.2      Meds   0.9 % sodium chloride infusion    Rate: 50 mL/hr   potassium chloride 10 mEq/100 mL IVPB (Peripheral Line)  x6   hiamine tablet 100 mg po qd   pantoprazole (PROTONIX) tablet 40 mg po BID   lactulose (CHRONULAC) 10 GM/15ML solution 20 g po bid   folic acid (FOLVITE) tablet 1 mg po qd   cefTRIAXone (ROCEPHIN) 1,000 mg in sterile water 10 mL IV syringe q24h           Per GI   Plan:   ? Continue Protonix 40 mg daily   ? Continue Lactulose to 20 g twice daily   ? Continue IV Solu-Medrol 20 mg daily while inpatient will order taper dose of prednisone 20 MG daily X 5 days, 10 MG daily X 5 days then stop    ? IV fluids as ordered per PCP   ? Diet as tolerated   ? Monitor CBC, CMP, ammonia and INR daily   ? Medical management per PCP   ? Potassium management per PCP   ? Supportive Care   ?  Continue to monitor   ? Discharge per PCP, ok from GI POV with outpatient follow up visit. Currently has follow up appt scheduled for this Monday 10/25/21. Labs printed and to be given to patient upon discharge.        Per IM      Assessment & Plan   1. Alcoholic Hepatitis   2. Hyperbilirubinemia   3. Lactic acidosis    4. Recent Alcohol Cessation    5. Hypokalemia   6. Macrocytic anemia - multifactorial liver disease and folic acid deficiency. 7. UTI             Continue on steroids per their recommendation. Await am labs. Continue to replace electrolytes. Continue on folic acid. Urine culture showing E coli.  Awaiting sensitivity for oral conversion and possible discharge later today depending on progress.

## 2022-04-08 ENCOUNTER — HOSPITAL ENCOUNTER (INPATIENT)
Age: 42
LOS: 3 days | Discharge: HOME | DRG: 378 | End: 2022-04-11
Attending: EMERGENCY MEDICINE | Admitting: INTERNAL MEDICINE
Payer: COMMERCIAL

## 2022-04-08 DIAGNOSIS — F10.939 ALCOHOL WITHDRAWAL SYNDROME WITH COMPLICATION (HCC): ICD-10-CM

## 2022-04-08 DIAGNOSIS — N93.9 VAGINAL BLEEDING: Primary | ICD-10-CM

## 2022-04-08 DIAGNOSIS — K92.0 HEMATEMESIS WITH NAUSEA: ICD-10-CM

## 2022-04-08 LAB
ABO/RH: NORMAL
ALBUMIN SERPL-MCNC: 4.8 G/DL (ref 3.5–5.2)
ALP BLD-CCNC: 152 U/L (ref 35–104)
ALT SERPL-CCNC: 70 U/L (ref 0–32)
AMMONIA: 41.4 UMOL/L (ref 11–51)
ANION GAP SERPL CALCULATED.3IONS-SCNC: 27 MMOL/L (ref 7–16)
ANISOCYTOSIS: ABNORMAL
ANTIBODY SCREEN: NORMAL
APTT: 31.9 SEC (ref 24.5–35.1)
AST SERPL-CCNC: 185 U/L (ref 0–31)
BACTERIA: ABNORMAL /HPF
BASOPHILS ABSOLUTE: 0.03 E9/L (ref 0–0.2)
BASOPHILS RELATIVE PERCENT: 0.5 % (ref 0–2)
BILIRUB SERPL-MCNC: 2.6 MG/DL (ref 0–1.2)
BILIRUBIN URINE: NEGATIVE
BLOOD, URINE: ABNORMAL
BUN BLDV-MCNC: 7 MG/DL (ref 6–20)
CALCIUM SERPL-MCNC: 9.3 MG/DL (ref 8.6–10.2)
CHLORIDE BLD-SCNC: 95 MMOL/L (ref 98–107)
CLARITY: ABNORMAL
CO2: 14 MMOL/L (ref 22–29)
COLOR: YELLOW
CREAT SERPL-MCNC: 0.4 MG/DL (ref 0.5–1)
EOSINOPHILS ABSOLUTE: 0.22 E9/L (ref 0.05–0.5)
EOSINOPHILS RELATIVE PERCENT: 3.5 % (ref 0–6)
EPITHELIAL CELLS, UA: ABNORMAL /HPF
ETHANOL: <10 MG/DL (ref 0–0.08)
GFR AFRICAN AMERICAN: >60
GFR NON-AFRICAN AMERICAN: >60 ML/MIN/1.73
GLUCOSE BLD-MCNC: 80 MG/DL (ref 74–99)
GLUCOSE URINE: NEGATIVE MG/DL
HCG(URINE) PREGNANCY TEST: NEGATIVE
HCT VFR BLD CALC: 42.7 % (ref 34–48)
HEMOGLOBIN: 14 G/DL (ref 11.5–15.5)
HYPOCHROMIA: ABNORMAL
IMMATURE GRANULOCYTES #: 0.05 E9/L
IMMATURE GRANULOCYTES %: 0.8 % (ref 0–5)
INR BLD: 1.1
KETONES, URINE: >=80 MG/DL
LACTIC ACID: 1.2 MMOL/L (ref 0.5–2.2)
LEUKOCYTE ESTERASE, URINE: NEGATIVE
LYMPHOCYTES ABSOLUTE: 0.39 E9/L (ref 1.5–4)
LYMPHOCYTES RELATIVE PERCENT: 6.3 % (ref 20–42)
MCH RBC QN AUTO: 30.2 PG (ref 26–35)
MCHC RBC AUTO-ENTMCNC: 32.8 % (ref 32–34.5)
MCV RBC AUTO: 92.2 FL (ref 80–99.9)
MONOCYTES ABSOLUTE: 0.29 E9/L (ref 0.1–0.95)
MONOCYTES RELATIVE PERCENT: 4.7 % (ref 2–12)
NEUTROPHILS ABSOLUTE: 5.23 E9/L (ref 1.8–7.3)
NEUTROPHILS RELATIVE PERCENT: 84.2 % (ref 43–80)
NITRITE, URINE: POSITIVE
PDW BLD-RTO: 16.8 FL (ref 11.5–15)
PH UA: 6 (ref 5–9)
PLATELET # BLD: 83 E9/L (ref 130–450)
PLATELET CONFIRMATION: NORMAL
PMV BLD AUTO: 11 FL (ref 7–12)
POTASSIUM REFLEX MAGNESIUM: 4 MMOL/L (ref 3.5–5)
PROTEIN UA: 100 MG/DL
PROTHROMBIN TIME: 11.7 SEC (ref 9.3–12.4)
RBC # BLD: 4.63 E12/L (ref 3.5–5.5)
RBC UA: >20 /HPF (ref 0–2)
SODIUM BLD-SCNC: 136 MMOL/L (ref 132–146)
SPECIFIC GRAVITY UA: >=1.03 (ref 1–1.03)
TOTAL PROTEIN: 7.7 G/DL (ref 6.4–8.3)
UROBILINOGEN, URINE: 0.2 E.U./DL
WBC # BLD: 6.2 E9/L (ref 4.5–11.5)
WBC UA: ABNORMAL /HPF (ref 0–5)

## 2022-04-08 PROCEDURE — 87088 URINE BACTERIA CULTURE: CPT

## 2022-04-08 PROCEDURE — 6360000002 HC RX W HCPCS: Performed by: EMERGENCY MEDICINE

## 2022-04-08 PROCEDURE — 2580000003 HC RX 258: Performed by: EMERGENCY MEDICINE

## 2022-04-08 PROCEDURE — 81001 URINALYSIS AUTO W/SCOPE: CPT

## 2022-04-08 PROCEDURE — 96374 THER/PROPH/DIAG INJ IV PUSH: CPT

## 2022-04-08 PROCEDURE — A4216 STERILE WATER/SALINE, 10 ML: HCPCS | Performed by: EMERGENCY MEDICINE

## 2022-04-08 PROCEDURE — 81025 URINE PREGNANCY TEST: CPT

## 2022-04-08 PROCEDURE — 86901 BLOOD TYPING SEROLOGIC RH(D): CPT

## 2022-04-08 PROCEDURE — 86850 RBC ANTIBODY SCREEN: CPT

## 2022-04-08 PROCEDURE — 96361 HYDRATE IV INFUSION ADD-ON: CPT

## 2022-04-08 PROCEDURE — 85730 THROMBOPLASTIN TIME PARTIAL: CPT

## 2022-04-08 PROCEDURE — C9113 INJ PANTOPRAZOLE SODIUM, VIA: HCPCS | Performed by: EMERGENCY MEDICINE

## 2022-04-08 PROCEDURE — 93005 ELECTROCARDIOGRAM TRACING: CPT | Performed by: EMERGENCY MEDICINE

## 2022-04-08 PROCEDURE — 86900 BLOOD TYPING SEROLOGIC ABO: CPT

## 2022-04-08 PROCEDURE — 99284 EMERGENCY DEPT VISIT MOD MDM: CPT

## 2022-04-08 PROCEDURE — 80053 COMPREHEN METABOLIC PANEL: CPT

## 2022-04-08 PROCEDURE — G0378 HOSPITAL OBSERVATION PER HR: HCPCS

## 2022-04-08 PROCEDURE — 96375 TX/PRO/DX INJ NEW DRUG ADDON: CPT

## 2022-04-08 PROCEDURE — 82077 ASSAY SPEC XCP UR&BREATH IA: CPT

## 2022-04-08 PROCEDURE — 85610 PROTHROMBIN TIME: CPT

## 2022-04-08 PROCEDURE — 85025 COMPLETE CBC W/AUTO DIFF WBC: CPT

## 2022-04-08 PROCEDURE — 2060000000 HC ICU INTERMEDIATE R&B

## 2022-04-08 PROCEDURE — 83605 ASSAY OF LACTIC ACID: CPT

## 2022-04-08 PROCEDURE — 96376 TX/PRO/DX INJ SAME DRUG ADON: CPT

## 2022-04-08 PROCEDURE — 2580000003 HC RX 258: Performed by: INTERNAL MEDICINE

## 2022-04-08 PROCEDURE — 82140 ASSAY OF AMMONIA: CPT

## 2022-04-08 PROCEDURE — 87186 SC STD MICRODIL/AGAR DIL: CPT

## 2022-04-08 RX ORDER — LORAZEPAM 1 MG/1
4 TABLET ORAL
Status: DISCONTINUED | OUTPATIENT
Start: 2022-04-08 | End: 2022-04-11 | Stop reason: HOSPADM

## 2022-04-08 RX ORDER — 0.9 % SODIUM CHLORIDE 0.9 %
1000 INTRAVENOUS SOLUTION INTRAVENOUS ONCE
Status: COMPLETED | OUTPATIENT
Start: 2022-04-08 | End: 2022-04-08

## 2022-04-08 RX ORDER — LORAZEPAM 2 MG/ML
4 INJECTION INTRAMUSCULAR
Status: DISCONTINUED | OUTPATIENT
Start: 2022-04-08 | End: 2022-04-11 | Stop reason: HOSPADM

## 2022-04-08 RX ORDER — ESOMEPRAZOLE MAGNESIUM 20 MG/1
20 FOR SUSPENSION ORAL DAILY
Status: ON HOLD | COMMUNITY
End: 2022-04-11 | Stop reason: HOSPADM

## 2022-04-08 RX ORDER — LORAZEPAM 1 MG/1
2 TABLET ORAL
Status: DISCONTINUED | OUTPATIENT
Start: 2022-04-08 | End: 2022-04-11 | Stop reason: HOSPADM

## 2022-04-08 RX ORDER — OCTREOTIDE ACETATE 50 UG/ML
50 INJECTION, SOLUTION INTRAVENOUS; SUBCUTANEOUS ONCE
Status: COMPLETED | OUTPATIENT
Start: 2022-04-08 | End: 2022-04-08

## 2022-04-08 RX ORDER — SODIUM CHLORIDE 9 MG/ML
1000 INJECTION, SOLUTION INTRAVENOUS CONTINUOUS
Status: DISCONTINUED | OUTPATIENT
Start: 2022-04-08 | End: 2022-04-09

## 2022-04-08 RX ORDER — LORAZEPAM 1 MG/1
3 TABLET ORAL
Status: DISCONTINUED | OUTPATIENT
Start: 2022-04-08 | End: 2022-04-11 | Stop reason: HOSPADM

## 2022-04-08 RX ORDER — LORAZEPAM 2 MG/ML
1 INJECTION INTRAMUSCULAR ONCE
Status: COMPLETED | OUTPATIENT
Start: 2022-04-08 | End: 2022-04-08

## 2022-04-08 RX ORDER — SODIUM CHLORIDE 9 MG/ML
INJECTION, SOLUTION INTRAVENOUS CONTINUOUS
Status: DISCONTINUED | OUTPATIENT
Start: 2022-04-08 | End: 2022-04-09

## 2022-04-08 RX ORDER — ONDANSETRON 2 MG/ML
4 INJECTION INTRAMUSCULAR; INTRAVENOUS ONCE
Status: COMPLETED | OUTPATIENT
Start: 2022-04-08 | End: 2022-04-08

## 2022-04-08 RX ORDER — SODIUM CHLORIDE 9 MG/ML
INJECTION, SOLUTION INTRAVENOUS PRN
Status: DISCONTINUED | OUTPATIENT
Start: 2022-04-08 | End: 2022-04-11 | Stop reason: HOSPADM

## 2022-04-08 RX ORDER — ESOMEPRAZOLE MAGNESIUM 20 MG/1
40 FOR SUSPENSION ORAL DAILY
Status: DISCONTINUED | OUTPATIENT
Start: 2022-04-08 | End: 2022-04-08 | Stop reason: CLARIF

## 2022-04-08 RX ORDER — LORAZEPAM 1 MG/1
1 TABLET ORAL
Status: DISCONTINUED | OUTPATIENT
Start: 2022-04-08 | End: 2022-04-11 | Stop reason: HOSPADM

## 2022-04-08 RX ORDER — SODIUM CHLORIDE 0.9 % (FLUSH) 0.9 %
5-40 SYRINGE (ML) INJECTION PRN
Status: DISCONTINUED | OUTPATIENT
Start: 2022-04-08 | End: 2022-04-11 | Stop reason: HOSPADM

## 2022-04-08 RX ORDER — SODIUM CHLORIDE 0.9 % (FLUSH) 0.9 %
5-40 SYRINGE (ML) INJECTION EVERY 12 HOURS SCHEDULED
Status: DISCONTINUED | OUTPATIENT
Start: 2022-04-08 | End: 2022-04-11 | Stop reason: HOSPADM

## 2022-04-08 RX ORDER — LORAZEPAM 2 MG/ML
2 INJECTION INTRAMUSCULAR
Status: DISCONTINUED | OUTPATIENT
Start: 2022-04-08 | End: 2022-04-11 | Stop reason: HOSPADM

## 2022-04-08 RX ORDER — LORAZEPAM 2 MG/ML
3 INJECTION INTRAMUSCULAR
Status: DISCONTINUED | OUTPATIENT
Start: 2022-04-08 | End: 2022-04-11 | Stop reason: HOSPADM

## 2022-04-08 RX ORDER — LANSOPRAZOLE
30 KIT
Status: DISCONTINUED | OUTPATIENT
Start: 2022-04-09 | End: 2022-04-11 | Stop reason: ALTCHOICE

## 2022-04-08 RX ORDER — LORAZEPAM 2 MG/ML
1 INJECTION INTRAMUSCULAR
Status: DISCONTINUED | OUTPATIENT
Start: 2022-04-08 | End: 2022-04-11 | Stop reason: HOSPADM

## 2022-04-08 RX ADMIN — SODIUM CHLORIDE 80 MG: 9 INJECTION, SOLUTION INTRAMUSCULAR; INTRAVENOUS; SUBCUTANEOUS at 13:16

## 2022-04-08 RX ADMIN — SODIUM CHLORIDE 1000 ML: 9 INJECTION, SOLUTION INTRAVENOUS at 17:02

## 2022-04-08 RX ADMIN — OCTREOTIDE ACETATE 50 MCG: 50 INJECTION, SOLUTION INTRAVENOUS; SUBCUTANEOUS at 15:02

## 2022-04-08 RX ADMIN — LORAZEPAM 2 MG: 2 INJECTION INTRAMUSCULAR; INTRAVENOUS at 13:34

## 2022-04-08 RX ADMIN — SODIUM CHLORIDE: 9 INJECTION, SOLUTION INTRAVENOUS at 21:08

## 2022-04-08 RX ADMIN — SODIUM CHLORIDE 1000 ML: 9 INJECTION, SOLUTION INTRAVENOUS at 13:12

## 2022-04-08 RX ADMIN — OCTREOTIDE ACETATE 50 MCG/HR: 500 INJECTION, SOLUTION INTRAVENOUS; SUBCUTANEOUS at 21:01

## 2022-04-08 RX ADMIN — OCTREOTIDE ACETATE 50 MCG/HR: 500 INJECTION, SOLUTION INTRAVENOUS; SUBCUTANEOUS at 13:26

## 2022-04-08 RX ADMIN — ONDANSETRON 4 MG: 2 INJECTION INTRAMUSCULAR; INTRAVENOUS at 13:16

## 2022-04-08 RX ADMIN — SODIUM CHLORIDE, PRESERVATIVE FREE 10 ML: 5 INJECTION INTRAVENOUS at 21:10

## 2022-04-08 RX ADMIN — LORAZEPAM 1 MG: 2 INJECTION INTRAMUSCULAR; INTRAVENOUS at 16:30

## 2022-04-08 ASSESSMENT — PAIN SCALES - GENERAL: PAINLEVEL_OUTOF10: 0

## 2022-04-08 NOTE — PROGRESS NOTES
Database initiated pharmacy and medications verified with the patient. She is A&O from home with . She admits to daily alcohol consumption and she aware she may start go into withdrawals.

## 2022-04-08 NOTE — ED PROVIDER NOTES
HPI:  4/8/22,   Time: 11:50 AM EDT       Rupali Mckenzie is a 36 y.o. female presenting to the ED for n/v/vag bleeding, beginning 1 day ago. The complaint has been persistent, moderate in severity, and worsened by nothing. 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 Vag bleeding yesterday, no time for menses. Pos abd pain, upper. Emesis multi times today, coffee ground. Nothing makes better. No fever/chills/sweats/cough/congestion. Hx multi admits for liver failure in past.  Pt states is tryin to stop using etoh    Review of Systems:   Pertinent positives and negatives are stated within HPI, all other systems reviewed and are negative.          --------------------------------------------- PAST HISTORY ---------------------------------------------  Past Medical History:  has a past medical history of Cooper-Danlos disease. Past Surgical History:  has a past surgical history that includes Tonsillectomy and Upper gastrointestinal endoscopy (N/A, 4/11/2022). Social History:  reports that she has been smoking. She has been smoking about 1.00 pack per day. She has never used smokeless tobacco. She reports previous alcohol use. She reports that she does not use drugs. Family History: family history is not on file. The patients home medications have been reviewed. Allergies: Penicillins and Erythromycin        ---------------------------------------------------PHYSICAL EXAM--------------------------------------    Constitutional/General: Alert and oriented x3, chronically ill appearing  Head: Normocephalic and atraumatic  Eyes: PERRL, EOMI, conjunctive normal, clera icteric  Mouth: Oropharynx clear, handling secretions,   Neck: Supple, full ROM,   Respiratory: Lungs clear to auscultation bilaterally, no wheezes, rales, or rhonchi. Not in respiratory distress  Cardiovascular:  Regular rate. Regular rhythm. No murmurs, gallops, or rubs.  2+ distal pulses  Chest: No chest wall tenderness  GI:  Abdomen Soft, upper abd ttp, Non distended. No organomegaly, no palpable masses,  No rebound, guarding, or rigidity. Musculoskeletal: Moves all extremities x 4. Warm and well perfused, no clubbing, cyanosis, or edema. Capillary refill <3 seconds  Integument: skin warm and dry. No rashes. Lymphatic: no lymphadenopathy noted  Neurologic: GCS 15, no focal deficits,   Psychiatric: Normal Affect    -------------------------------------------------- RESULTS -------------------------------------------------  I have personally reviewed all laboratory and imaging results for this patient. Results are listed below.      LABS:  Results for orders placed or performed during the hospital encounter of 04/08/22   Culture, Urine    Specimen: Urine, clean catch   Result Value Ref Range    Organism Escherichia coli (A)     Urine Culture, Routine 50,000 CFU/ml        Susceptibility    Escherichia coli - BACTERIAL SUSCEPTIBILITY PANEL BY JOHN     amoxicillin-clavulanate ^4 Sensitive mcg/mL     ceFAZolin <=^4 Sensitive mcg/mL     cefepime <=^0.12 Sensitive mcg/mL     cefotaxime <=^0.25 Sensitive mcg/mL     cefOXitin <=^4 Sensitive mcg/mL     cefTAZidime-avibactam <=^0.12 Sensitive mcg/mL     gentamicin <=^1 Sensitive mcg/mL     levofloxacin <=^0.12 Sensitive mcg/mL     meropenem <=^0.25 Sensitive mcg/mL     nitrofurantoin <=^16 Sensitive mcg/mL     piperacillin-tazobactam <=^4 Sensitive mcg/mL     trimethoprim-sulfamethoxazole <=^20 Sensitive mcg/mL   CBC with Auto Differential   Result Value Ref Range    WBC 6.2 4.5 - 11.5 E9/L    RBC 4.63 3.50 - 5.50 E12/L    Hemoglobin 14.0 11.5 - 15.5 g/dL    Hematocrit 42.7 34.0 - 48.0 %    MCV 92.2 80.0 - 99.9 fL    MCH 30.2 26.0 - 35.0 pg    MCHC 32.8 32.0 - 34.5 %    RDW 16.8 (H) 11.5 - 15.0 fL    Platelets 83 (L) 646 - 450 E9/L    MPV 11.0 7.0 - 12.0 fL    Neutrophils % 84.2 (H) 43.0 - 80.0 %    Immature Granulocytes % 0.8 0.0 - 5.0 %    Lymphocytes % 6.3 (L) 20.0 - 42.0 %    Monocytes % 4.7 2.0 - 12.0 %    Eosinophils % 3.5 0.0 - 6.0 %    Basophils % 0.5 0.0 - 2.0 %    Neutrophils Absolute 5.23 1.80 - 7.30 E9/L    Immature Granulocytes # 0.05 E9/L    Lymphocytes Absolute 0.39 (L) 1.50 - 4.00 E9/L    Monocytes Absolute 0.29 0.10 - 0.95 E9/L    Eosinophils Absolute 0.22 0.05 - 0.50 E9/L    Basophils Absolute 0.03 0.00 - 0.20 E9/L    Anisocytosis 1+     Hypochromia 1+    Comprehensive Metabolic Panel w/ Reflex to MG   Result Value Ref Range    Sodium 136 132 - 146 mmol/L    Potassium reflex Magnesium 4.0 3.5 - 5.0 mmol/L    Chloride 95 (L) 98 - 107 mmol/L    CO2 14 (L) 22 - 29 mmol/L    Anion Gap 27 (H) 7 - 16 mmol/L    Glucose 80 74 - 99 mg/dL    BUN 7 6 - 20 mg/dL    CREATININE 0.4 (L) 0.5 - 1.0 mg/dL    GFR Non-African American >60 >=60 mL/min/1.73    GFR African American >60     Calcium 9.3 8.6 - 10.2 mg/dL    Total Protein 7.7 6.4 - 8.3 g/dL    Albumin 4.8 3.5 - 5.2 g/dL    Total Bilirubin 2.6 (H) 0.0 - 1.2 mg/dL    Alkaline Phosphatase 152 (H) 35 - 104 U/L    ALT 70 (H) 0 - 32 U/L     (H) 0 - 31 U/L   Protime-INR   Result Value Ref Range    Protime 11.7 9.3 - 12.4 sec    INR 1.1    APTT   Result Value Ref Range    aPTT 31.9 24.5 - 35.1 sec   Urinalysis with Microscopic   Result Value Ref Range    Color, UA Yellow Straw/Yellow    Clarity, UA SL CLOUDY Clear    Glucose, Ur Negative Negative mg/dL    Bilirubin Urine Negative Negative    Ketones, Urine >=80 (A) Negative mg/dL    Specific Gravity, UA >=1.030 1.005 - 1.030    Blood, Urine LARGE (A) Negative    pH, UA 6.0 5.0 - 9.0    Protein,  (A) Negative mg/dL    Urobilinogen, Urine 0.2 <2.0 E.U./dL    Nitrite, Urine POSITIVE (A) Negative    Leukocyte Esterase, Urine Negative Negative    WBC, UA 1-3 0 - 5 /HPF    RBC, UA >20 0 - 2 /HPF    Epithelial Cells, UA MODERATE /HPF    Bacteria, UA MANY (A) None Seen /HPF   Pregnancy, Urine   Result Value Ref Range    HCG(Urine) Pregnancy Test NEGATIVE NEGATIVE Ammonia   Result Value Ref Range    Ammonia 41.4 11.0 - 51.0 umol/L   Platelet Confirmation   Result Value Ref Range    Platelet Confirmation CONFIRMED    Ethanol   Result Value Ref Range    Ethanol Lvl <10 mg/dL   Lactic Acid   Result Value Ref Range    Lactic Acid 1.2 0.5 - 2.2 mmol/L   CBC   Result Value Ref Range    WBC 3.5 (L) 4.5 - 11.5 E9/L    RBC 4.11 3.50 - 5.50 E12/L    Hemoglobin 12.5 11.5 - 15.5 g/dL    Hematocrit 39.8 34.0 - 48.0 %    MCV 96.8 80.0 - 99.9 fL    MCH 30.4 26.0 - 35.0 pg    MCHC 31.4 (L) 32.0 - 34.5 %    RDW 16.3 (H) 11.5 - 15.0 fL    Platelets 69 (L) 441 - 450 E9/L    MPV 11.6 7.0 - 12.0 fL   Comprehensive Metabolic Panel   Result Value Ref Range    Sodium 131 (L) 132 - 146 mmol/L    Potassium 5.0 3.5 - 5.0 mmol/L    Chloride 98 98 - 107 mmol/L    CO2 14 (L) 22 - 29 mmol/L    Anion Gap 19 (H) 7 - 16 mmol/L    Glucose 211 (H) 74 - 99 mg/dL    BUN 5 (L) 6 - 20 mg/dL    CREATININE 0.5 0.5 - 1.0 mg/dL    GFR Non-African American >60 >=60 mL/min/1.73    GFR African American >60     Calcium 8.6 8.6 - 10.2 mg/dL    Total Protein 7.1 6.4 - 8.3 g/dL    Albumin 4.6 3.5 - 5.2 g/dL    Total Bilirubin 2.4 (H) 0.0 - 1.2 mg/dL    Alkaline Phosphatase 129 (H) 35 - 104 U/L    ALT 56 (H) 0 - 32 U/L     (H) 0 - 31 U/L   Platelet Confirmation   Result Value Ref Range    Platelet Confirmation SEE BELOW    Vitamin B12   Result Value Ref Range    Vitamin B-12 318 211 - 946 pg/mL   CBC with Auto Differential   Result Value Ref Range    WBC 4.3 (L) 4.5 - 11.5 E9/L    RBC 4.10 3.50 - 5.50 E12/L    Hemoglobin 12.6 11.5 - 15.5 g/dL    Hematocrit 37.8 34.0 - 48.0 %    MCV 92.2 80.0 - 99.9 fL    MCH 30.7 26.0 - 35.0 pg    MCHC 33.3 32.0 - 34.5 %    RDW 15.6 (H) 11.5 - 15.0 fL    Platelets 72 (L) 674 - 450 E9/L    MPV 11.7 7.0 - 12.0 fL    Neutrophils % 77.6 43.0 - 80.0 %    Immature Granulocytes % 0.7 0.0 - 5.0 %    Lymphocytes % 12.0 (L) 20.0 - 42.0 %    Monocytes % 6.7 2.0 - 12.0 %    Eosinophils % 2.5 0.0 - 6.0 %    Basophils % 0.5 0.0 - 2.0 %    Neutrophils Absolute 3.36 1.80 - 7.30 E9/L    Immature Granulocytes # 0.03 E9/L    Lymphocytes Absolute 0.52 (L) 1.50 - 4.00 E9/L    Monocytes Absolute 0.29 0.10 - 0.95 E9/L    Eosinophils Absolute 0.11 0.05 - 0.50 E9/L    Basophils Absolute 0.02 0.00 - 0.20 E9/L   Comprehensive Metabolic Panel   Result Value Ref Range    Sodium 132 132 - 146 mmol/L    Potassium 3.6 3.5 - 5.0 mmol/L    Chloride 99 98 - 107 mmol/L    CO2 22 22 - 29 mmol/L    Anion Gap 11 7 - 16 mmol/L    Glucose 238 (H) 74 - 99 mg/dL    BUN 7 6 - 20 mg/dL    CREATININE 0.4 (L) 0.5 - 1.0 mg/dL    GFR Non-African American >60 >=60 mL/min/1.73    GFR African American >60     Calcium 9.0 8.6 - 10.2 mg/dL    Total Protein 6.8 6.4 - 8.3 g/dL    Albumin 4.1 3.5 - 5.2 g/dL    Total Bilirubin 1.7 (H) 0.0 - 1.2 mg/dL    Alkaline Phosphatase 131 (H) 35 - 104 U/L    ALT 60 (H) 0 - 32 U/L     (H) 0 - 31 U/L   Platelet Confirmation   Result Value Ref Range    Platelet Confirmation CONFIRMED    CBC with Auto Differential   Result Value Ref Range    WBC 3.1 (L) 4.5 - 11.5 E9/L    RBC 4.02 3.50 - 5.50 E12/L    Hemoglobin 12.4 11.5 - 15.5 g/dL    Hematocrit 37.4 34.0 - 48.0 %    MCV 93.0 80.0 - 99.9 fL    MCH 30.8 26.0 - 35.0 pg    MCHC 33.2 32.0 - 34.5 %    RDW 15.7 (H) 11.5 - 15.0 fL    Platelets 69 (L) 620 - 450 E9/L    MPV 12.0 7.0 - 12.0 fL    Neutrophils % 58.6 43.0 - 80.0 %    Immature Granulocytes % 0.6 0.0 - 5.0 %    Lymphocytes % 30.6 20.0 - 42.0 %    Monocytes % 6.1 2.0 - 12.0 %    Eosinophils % 3.8 0.0 - 6.0 %    Basophils % 0.3 0.0 - 2.0 %    Neutrophils Absolute 1.84 1.80 - 7.30 E9/L    Immature Granulocytes # 0.02 E9/L    Lymphocytes Absolute 0.96 (L) 1.50 - 4.00 E9/L    Monocytes Absolute 0.19 0.10 - 0.95 E9/L    Eosinophils Absolute 0.12 0.05 - 0.50 E9/L    Basophils Absolute 0.01 0.00 - 0.20 E9/L   Comprehensive Metabolic Panel   Result Value Ref Range    Sodium 138 132 - 146 mmol/L    Potassium 3.9 3.5 - 5.0 mmol/L    Chloride 101 98 - 107 mmol/L    CO2 26 22 - 29 mmol/L    Anion Gap 11 7 - 16 mmol/L    Glucose 149 (H) 74 - 99 mg/dL    BUN 8 6 - 20 mg/dL    CREATININE 0.5 0.5 - 1.0 mg/dL    GFR Non-African American >60 >=60 mL/min/1.73    GFR African American >60     Calcium 9.5 8.6 - 10.2 mg/dL    Total Protein 6.1 (L) 6.4 - 8.3 g/dL    Albumin 3.9 3.5 - 5.2 g/dL    Total Bilirubin 1.6 (H) 0.0 - 1.2 mg/dL    Alkaline Phosphatase 132 (H) 35 - 104 U/L     (H) 0 - 32 U/L     (H) 0 - 31 U/L   Protime-INR   Result Value Ref Range    Protime 12.7 (H) 9.3 - 12.4 sec    INR 1.2    Platelet Confirmation   Result Value Ref Range    Platelet Confirmation CONFIRMED    Hemoglobin A1C   Result Value Ref Range    Hemoglobin A1C 4.4 4.0 - 5.6 %   EKG 12 Lead   Result Value Ref Range    Ventricular Rate 105 BPM    Atrial Rate 105 BPM    P-R Interval 132 ms    QRS Duration 74 ms    Q-T Interval 350 ms    QTc Calculation (Bazett) 462 ms    P Axis 58 degrees    R Axis 36 degrees    T Axis 64 degrees   TYPE AND SCREEN   Result Value Ref Range    ABO/Rh O POS     Antibody Screen NEG        RADIOLOGY:  Interpreted by Radiologist.  US ABDOMEN LIMITED   Final Result   Hepatic steatosis. Mild hepatomegaly of 19 cm. Trace gallbladder sludge. No sonographic evidence of cholelithiasis. Normal   caliber CBD. EKG:  This EKG is signed and interpreted by the EP. Time: 1220  Rate: 105  Rhythm: Sinus  Interpretation: sinus tachycardia  Comparison: None      ------------------------- NURSING NOTES AND VITALS REVIEWED ---------------------------   The nursing notes within the ED encounter and vital signs as below have been reviewed by myself.   /86   Pulse 77   Temp 97.9 °F (36.6 °C) (Axillary)   Resp 15   Ht 5' 2\" (1.575 m)   Wt 137 lb (62.1 kg)   SpO2 100%   BMI 25.06 kg/m²   Oxygen Saturation Interpretation: Normal    The patients available past medical records and past encounters were reviewed. ------------------------------ ED COURSE/MEDICAL DECISION MAKING----------------------  Medications   0.9 % sodium chloride bolus (0 mLs IntraVENous Stopped 4/8/22 1501)   octreotide (SANDOSTATIN) injection 50 mcg (50 mcg IntraVENous Given 4/8/22 1502)   ondansetron (ZOFRAN) injection 4 mg (4 mg IntraVENous Given 4/8/22 1316)   pantoprazole (PROTONIX) 80 mg in sodium chloride (PF) 20 mL injection (80 mg IntraVENous Given 4/8/22 1316)   LORazepam (ATIVAN) injection 1 mg (1 mg IntraVENous Given 4/8/22 1630)   0.9 % sodium chloride bolus (0 mLs IntraVENous Stopped 4/8/22 2012)         ED COURSE:       Medical Decision Making:    ciwa >8, ativan ordered, pt hematemesis with withdraw sx, bp stable in ed, labs noted, ivf given, admit for further care      This patient's ED course included: a personal history and physicial examination    This patient has remained hemodynamically stable during their ED course. Re-Evaluations:             Re-evaluation. Patients symptoms are improving    Re-examination  4/8/22   2 p EDT          Vital Signs:   Vitals:    04/11/22 1255 04/11/22 1310 04/11/22 1325 04/11/22 1354   BP: 110/79 118/76 133/66 123/86   Pulse: 89 80 81 77   Resp: 16 16 15    Temp:    97.9 °F (36.6 °C)   TempSrc:    Axillary   SpO2: 96% 98% 100% 100%   Weight:       Height:                 Consultations: On call pcp    Critical Care: 35 min        Counseling: The emergency provider has spoken with the patient and discussed todays results, in addition to providing specific details for the plan of care and counseling regarding the diagnosis and prognosis. Questions are answered at this time and they are agreeable with the plan.       --------------------------------- IMPRESSION AND DISPOSITION ---------------------------------    IMPRESSION  1. Vaginal bleeding    2. Alcohol withdrawal syndrome with complication ([facility] 75.)    3.  Hematemesis with nausea        DISPOSITION  Disposition: Admit to telemetry  Patient condition is stable    NOTE: This report was transcribed using voice recognition software.  Every effort was made to ensure accuracy; however, inadvertent computerized transcription errors may be present        Juan Desouza MD  04/11/22 1239

## 2022-04-08 NOTE — CARE COORDINATION
Peer Support met with pt while  was in the room. Pt admits to relapsing (alcohol)  after quitting her job. Pt plans to continue going to [facility] for counseling. Pt is interested in learning more about the Vivitrol shot. Information was given and AA meetings were recommended.

## 2022-04-09 ENCOUNTER — APPOINTMENT (OUTPATIENT)
Dept: ULTRASOUND IMAGING | Age: 42
DRG: 378 | End: 2022-04-09
Payer: COMMERCIAL

## 2022-04-09 LAB
ALBUMIN SERPL-MCNC: 4.6 G/DL (ref 3.5–5.2)
ALP BLD-CCNC: 129 U/L (ref 35–104)
ALT SERPL-CCNC: 56 U/L (ref 0–32)
ANION GAP SERPL CALCULATED.3IONS-SCNC: 19 MMOL/L (ref 7–16)
AST SERPL-CCNC: 125 U/L (ref 0–31)
BILIRUB SERPL-MCNC: 2.4 MG/DL (ref 0–1.2)
BUN BLDV-MCNC: 5 MG/DL (ref 6–20)
CALCIUM SERPL-MCNC: 8.6 MG/DL (ref 8.6–10.2)
CHLORIDE BLD-SCNC: 98 MMOL/L (ref 98–107)
CO2: 14 MMOL/L (ref 22–29)
CREAT SERPL-MCNC: 0.5 MG/DL (ref 0.5–1)
EKG ATRIAL RATE: 105 BPM
EKG P AXIS: 58 DEGREES
EKG P-R INTERVAL: 132 MS
EKG Q-T INTERVAL: 350 MS
EKG QRS DURATION: 74 MS
EKG QTC CALCULATION (BAZETT): 462 MS
EKG R AXIS: 36 DEGREES
EKG T AXIS: 64 DEGREES
EKG VENTRICULAR RATE: 105 BPM
GFR AFRICAN AMERICAN: >60
GFR NON-AFRICAN AMERICAN: >60 ML/MIN/1.73
GLUCOSE BLD-MCNC: 211 MG/DL (ref 74–99)
HCT VFR BLD CALC: 39.8 % (ref 34–48)
HEMOGLOBIN: 12.5 G/DL (ref 11.5–15.5)
MCH RBC QN AUTO: 30.4 PG (ref 26–35)
MCHC RBC AUTO-ENTMCNC: 31.4 % (ref 32–34.5)
MCV RBC AUTO: 96.8 FL (ref 80–99.9)
PDW BLD-RTO: 16.3 FL (ref 11.5–15)
PLATELET # BLD: 69 E9/L (ref 130–450)
PLATELET CONFIRMATION: NORMAL
PMV BLD AUTO: 11.6 FL (ref 7–12)
POTASSIUM SERPL-SCNC: 5 MMOL/L (ref 3.5–5)
RBC # BLD: 4.11 E12/L (ref 3.5–5.5)
SODIUM BLD-SCNC: 131 MMOL/L (ref 132–146)
TOTAL PROTEIN: 7.1 G/DL (ref 6.4–8.3)
VITAMIN B-12: 318 PG/ML (ref 211–946)
WBC # BLD: 3.5 E9/L (ref 4.5–11.5)

## 2022-04-09 PROCEDURE — 85027 COMPLETE CBC AUTOMATED: CPT

## 2022-04-09 PROCEDURE — 2580000003 HC RX 258: Performed by: EMERGENCY MEDICINE

## 2022-04-09 PROCEDURE — 76705 ECHO EXAM OF ABDOMEN: CPT

## 2022-04-09 PROCEDURE — 96376 TX/PRO/DX INJ SAME DRUG ADON: CPT

## 2022-04-09 PROCEDURE — 6370000000 HC RX 637 (ALT 250 FOR IP): Performed by: NURSE PRACTITIONER

## 2022-04-09 PROCEDURE — G0378 HOSPITAL OBSERVATION PER HR: HCPCS

## 2022-04-09 PROCEDURE — 80053 COMPREHEN METABOLIC PANEL: CPT

## 2022-04-09 PROCEDURE — 6370000000 HC RX 637 (ALT 250 FOR IP): Performed by: EMERGENCY MEDICINE

## 2022-04-09 PROCEDURE — 36415 COLL VENOUS BLD VENIPUNCTURE: CPT

## 2022-04-09 PROCEDURE — 93010 ELECTROCARDIOGRAM REPORT: CPT | Performed by: INTERNAL MEDICINE

## 2022-04-09 PROCEDURE — 6370000000 HC RX 637 (ALT 250 FOR IP): Performed by: INTERNAL MEDICINE

## 2022-04-09 PROCEDURE — 6360000002 HC RX W HCPCS: Performed by: EMERGENCY MEDICINE

## 2022-04-09 PROCEDURE — 82607 VITAMIN B-12: CPT

## 2022-04-09 PROCEDURE — 2060000000 HC ICU INTERMEDIATE R&B

## 2022-04-09 RX ORDER — LACTULOSE 10 G/15ML
20 SOLUTION ORAL 2 TIMES DAILY
Status: DISCONTINUED | OUTPATIENT
Start: 2022-04-09 | End: 2022-04-11 | Stop reason: HOSPADM

## 2022-04-09 RX ORDER — NICOTINE 21 MG/24HR
1 PATCH, TRANSDERMAL 24 HOURS TRANSDERMAL DAILY
Status: DISCONTINUED | OUTPATIENT
Start: 2022-04-09 | End: 2022-04-11 | Stop reason: HOSPADM

## 2022-04-09 RX ADMIN — OCTREOTIDE ACETATE 50 MCG/HR: 500 INJECTION, SOLUTION INTRAVENOUS; SUBCUTANEOUS at 19:31

## 2022-04-09 RX ADMIN — SODIUM CHLORIDE, PRESERVATIVE FREE 10 ML: 5 INJECTION INTRAVENOUS at 20:20

## 2022-04-09 RX ADMIN — LORAZEPAM 1 MG: 1 TABLET ORAL at 13:53

## 2022-04-09 RX ADMIN — LANSOPRAZOLE 30 MG: KIT at 06:40

## 2022-04-09 RX ADMIN — LACTULOSE 20 G: 20 SOLUTION ORAL at 20:21

## 2022-04-09 RX ADMIN — SODIUM CHLORIDE, PRESERVATIVE FREE 10 ML: 5 INJECTION INTRAVENOUS at 09:45

## 2022-04-09 RX ADMIN — OCTREOTIDE ACETATE 50 MCG/HR: 500 INJECTION, SOLUTION INTRAVENOUS; SUBCUTANEOUS at 09:46

## 2022-04-09 ASSESSMENT — PAIN SCALES - GENERAL
PAINLEVEL_OUTOF10: 0

## 2022-04-09 NOTE — CONSULTS
Gastroenterology Consult Note   Aislinn LAKE NP-C with Kaela Hinojosa M.D. Consult Note        Date of Service: 4/9/2022  Reason for Consult: liver failure/thrombocytopenia/hematemesis  Requesting Physician: [address]:  Center abdominal pain    History Obtained From:  Patient, EMR    HISTORY OF PRESENT ILLNESS:       Ivette Nathan is a 36 y.o. female with significant past medical history of  alcohol abuse and Cooper-Danlos disease admitted via ED for weakness & fatigue. Pt reports to relapsing with her alcohol abuse. Patient reports her  drinks alcohol as well, so states she \"just can't seem too quit\". Started drinking again in January, and recently has progressed back to 1 pint of Tequila daily. With epigastric pain, \"sharp\" denies any radiation of the pain. Rated at a 8/10. Hurts when she tries to lay flat. Nausea with vomiting. Describes emesis as \"coffee ground, dark\". Chilling episodes at times. Denies any fevers. With a poor appetite most recently, denies any weight loss. Patient has never had a colonoscopy. Last BM was 4 days ago, \"soft brown\". Patient report she isn't eating well so attributes that to irregular BMs. +flatus. EGD 1/28/21 with Dr. Jerald Raymundo: GERD, gastritis, no esophageal varices. Admission labs chloride 95, CO2 14, creatinine 0.4, anion gap 27, , ALT 70, , bili 2.6, RDW 16.8, plat 83, neutro 84.2%, lymph 6.3%, abso lymph 0.39. Consultation for  liver failure/thrombocytopenia/hematemesis. Pt is  known to Dr. Jerald Raymundo, last seen with prior hospitalization in 2021. Currently, pt reports to feeling better since being admitted.   Labs today , CO2 14, BUN 5, anion gap 19, , , ALT 56, , bili 2.4, WBC 3.5, MCHC 31.4, RDW 16.3, plat 69    Past Medical History:        Diagnosis Date    Cooper-Danlos disease      Past Surgical History:        Procedure Laterality Date    TONSILLECTOMY       Current Medications:    Current Facility-Administered Medications: octreotide (SANDOSTATIN) 500 mcg in sodium chloride 0.9 % 100 mL infusion, 50 mcg/hr, IntraVENous, Continuous  sodium chloride flush 0.9 % injection 5-40 mL, 5-40 mL, IntraVENous, 2 times per day  sodium chloride flush 0.9 % injection 5-40 mL, 5-40 mL, IntraVENous, PRN  0.9 % sodium chloride infusion, , IntraVENous, PRN  LORazepam (ATIVAN) tablet 1 mg, 1 mg, Oral, Q1H PRN **OR** LORazepam (ATIVAN) injection 1 mg, 1 mg, IntraVENous, Q1H PRN **OR** LORazepam (ATIVAN) tablet 2 mg, 2 mg, Oral, Q1H PRN **OR** LORazepam (ATIVAN) injection 2 mg, 2 mg, IntraVENous, Q1H PRN **OR** LORazepam (ATIVAN) tablet 3 mg, 3 mg, Oral, Q1H PRN **OR** LORazepam (ATIVAN) injection 3 mg, 3 mg, IntraVENous, Q1H PRN **OR** LORazepam (ATIVAN) tablet 4 mg, 4 mg, Oral, Q1H PRN **OR** LORazepam (ATIVAN) injection 4 mg, 4 mg, IntraVENous, Q1H PRN  sertraline (ZOLOFT) tablet 50 mg, 50 mg, Oral, Daily  lansoprazole suspension SUSP 30 mg, 30 mg, Oral, QAM AC    Allergies:  Penicillins and Erythromycin    Social History:    Tobacco:  Pt reports she smokes cigarettes of < 1 ppd for 24 yrs. Alcohol:  Pt reports daily consumption of a pint of Tequila  Illicit Drugs: Pt denies.     Family History: Mother living with diverticulosis, diverticulitis, and bowel resections due to diverticulitis. She has Cooper-Danlos disease. Father living, DM 1.  1 sister living with thyroid disease. 1 brother living with seizures. 1 child with diabetes mellitus type 1.  1 child living and healthy. REVIEW OF SYSTEMS:    Aside from what was mentioned in the PMH and HPI, essentially unremarkable, all others negative.     PHYSICAL EXAM:      Vitals:    BP (!) 136/93   Pulse 86   Temp 98 °F (36.7 °C) (Oral)   Resp 18   Ht 5' 2\" (1.575 m)   Wt 137 lb (62.1 kg)   SpO2 100%   BMI 25.06 kg/m²       CONSTITUTIONAL:  awake, alert, cooperative, no apparent distress, and appears stated age  EYES:  pupils equal, round and reactive to light, sclera icteric and conjunctiva normal  ENT:  normocephalic, oral pharynx with moist mucous membranes  NECK:  supple   LUNGS:   clear to auscultation bilaterally.   CARDIOVASCULAR:  regular rate and rhythm, no murmur noted; 2+ pulses; no edema  ABDOMEN:   normal bowel sounds, soft, non-distended, non-tender, no masses palpated,   MUSCULOSKELETAL:  full range of motion noted  motor strength is 5 out of 5 all extremities bilaterally  NEUROLOGIC:  Mental Status Exam:  Level of Alertness:   awake  Orientation:   person, place, time  Motor Exam:  Motor exam is symmetrical 5 out of 5 all extremities bilaterally  SKIN:  Slight jaundice noted, normal skin color, texture, turgor    DATA:    CBC with Differential:    Lab Results   Component Value Date    WBC 3.5 04/09/2022    RBC 4.11 04/09/2022    HGB 12.5 04/09/2022    HCT 39.8 04/09/2022    PLT 69 04/09/2022    MCV 96.8 04/09/2022    MCH 30.4 04/09/2022    MCHC 31.4 04/09/2022    RDW 16.3 04/09/2022    LYMPHOPCT 6.3 04/08/2022    MONOPCT 4.7 04/08/2022    BASOPCT 0.5 04/08/2022    MONOSABS 0.29 04/08/2022    LYMPHSABS 0.39 04/08/2022    EOSABS 0.22 04/08/2022    BASOSABS 0.03 04/08/2022     CMP:    Lab Results   Component Value Date     04/09/2022    K 5.0 04/09/2022    K 4.0 04/08/2022    CL 98 04/09/2022    CO2 14 04/09/2022    BUN 5 04/09/2022    CREATININE 0.5 04/09/2022    GFRAA >60 04/09/2022    LABGLOM >60 04/09/2022    GLUCOSE 211 04/09/2022    PROT 7.1 04/09/2022    LABALBU 4.6 04/09/2022    CALCIUM 8.6 04/09/2022    BILITOT 2.4 04/09/2022    ALKPHOS 129 04/09/2022     04/09/2022    ALT 56 04/09/2022     Hepatic Function Panel:    Lab Results   Component Value Date    ALKPHOS 129 04/09/2022    ALT 56 04/09/2022     04/09/2022    PROT 7.1 04/09/2022    BILITOT 2.4 04/09/2022    BILIDIR 8.6 10/16/2021    IBILI 2.4 10/16/2021    LABALBU 4.6 04/09/2022     PT/INR:    Lab Results   Component Value Date    PROTIME 11.7 04/08/2022    INR 1.1 04/08/2022     PTT:    Lab Results   Component Value Date    APTT 31.9 04/08/2022   [APTT}  Last 3 Troponin:  No results found for: TROPONINI  TSH:    Lab Results   Component Value Date    TSH 2.180 10/15/2021     VITAMIN B12:   Lab Results   Component Value Date    AKDPOHBU10 276 10/21/2021     FOLATE:    Lab Results   Component Value Date    FOLATE 3.8 10/21/2021     IRON:    Lab Results   Component Value Date    IRON 123 10/15/2021     Iron Saturation:    Lab Results   Component Value Date    LABIRON 122 10/15/2021     TIBC:    Lab Results   Component Value Date    TIBC 101 10/15/2021     FERRITIN:    Lab Results   Component Value Date    FERRITIN 750 10/15/2021     HIV:  No results found for: HIV  MARKIE:    Lab Results   Component Value Date    MARKIE NEGATIVE 10/15/2021         No results found. IMPRESSION:    ? Elevated LFTs  ? Alcoholic fibrosis   ? Cooper-Danlos disease   ? Hx GERD, gastritis, small hiatal hernia   ? Daily ETOH use/ abuse  ? Hematemesis   ? Thrombocytopenia   ? Constipation       RECOMMENDATIONS:      ? US liver  ? Lactulose 20 GM TID  ? AFP today  ? Liver serology negative  in October last year; including hepatitis panel  ? EGD on Monday with Dr. Steve Sosa. Procedure details for EGD discussed in detail. Complications including but not limited to, perforation, bleeding and infection were discussed in great detail. Risks, benefits, and alternatives explained. Pt has understood the information and has agreed to proceed. ? Monitor for DTs, CIWA as ordered per PCP  ? Protonix as ordered  ? Continue IV fluids as ordered per PCP  ? Low fat diet, as tolerated  ? Medical management per Primary Care,including pain management   ? Monitor CMP, CBC, INR daily  ? Alcohol cessation  ? Supportive care  ? Continue to monitor      Thank you very much for your consultation. We will follow closely with you.     Discussed with Dr. Linda Quiroz developed by Dr. Dayan Christian, NP-TIFFANY 4/9/2022 1:30 PM for Dr. Saravia

## 2022-04-09 NOTE — PROGRESS NOTES
PROGRESS NOTE    Patient Presents with/Seen in Consultation For      *Reason for Consult: liver failure/thrombocytopenia/hematemesis     CHIEF COMPLAINT:  Center abdominal pain    Subjective:     Patient seen laying in bed. States she is feeling good today. Denies any N/V, or abdominal pain. Tolerating meals. BM today. EGD for tomorrow reviewed with the patient, all questions answered. Review of Systems  Aside from what was mentioned in the PMH and HPI, essentially unremarkable, all others negative. Objective:     /85   Pulse 94   Temp 98.3 °F (36.8 °C) (Oral)   Resp 18   Ht 5' 2\" (1.575 m)   Wt 137 lb (62.1 kg)   SpO2 100%   BMI 25.06 kg/m²      General appearance: alert, awake, laying in bed, and cooperative  Eyes: conjunctivae/corneas clear. PERRL.   Lungs: clear to auscultation bilaterally  Heart: regular rate and rhythm, no murmur, 2+ pulses;  without edema  Abdomen: soft, non-tender; bowel sounds normal; no masses,  no organomegaly  Extremities: extremities without edema  Pulses: 2+ and symmetric  Skin: Skin color, texture, turgor normal.   Neurologic: Grossly normal    lactulose (CHRONULAC) 10 GM/15ML solution 20 g, BID  nicotine (NICODERM CQ) 14 MG/24HR 1 patch, Daily  octreotide (SANDOSTATIN) 500 mcg in sodium chloride 0.9 % 100 mL infusion, Continuous  sodium chloride flush 0.9 % injection 5-40 mL, 2 times per day  sodium chloride flush 0.9 % injection 5-40 mL, PRN  0.9 % sodium chloride infusion, PRN  LORazepam (ATIVAN) tablet 1 mg, Q1H PRN   Or  LORazepam (ATIVAN) injection 1 mg, Q1H PRN   Or  LORazepam (ATIVAN) tablet 2 mg, Q1H PRN   Or  LORazepam (ATIVAN) injection 2 mg, Q1H PRN   Or  LORazepam (ATIVAN) tablet 3 mg, Q1H PRN   Or  LORazepam (ATIVAN) injection 3 mg, Q1H PRN   Or  LORazepam (ATIVAN) tablet 4 mg, Q1H PRN   Or  LORazepam (ATIVAN) injection 4 mg, Q1H PRN  sertraline (ZOLOFT) tablet 50 mg, Daily  lansoprazole suspension SUSP 30 mg, QAM AC         Data Review  CBC:   Lab Results   Component Value Date    WBC 3.5 04/09/2022    RBC 4.11 04/09/2022    HGB 12.5 04/09/2022    HCT 39.8 04/09/2022    MCV 96.8 04/09/2022    MCH 30.4 04/09/2022    MCHC 31.4 04/09/2022    RDW 16.3 04/09/2022    PLT 69 04/09/2022    MPV 11.6 04/09/2022     CMP:    Lab Results   Component Value Date     04/09/2022    K 5.0 04/09/2022    K 4.0 04/08/2022    CL 98 04/09/2022    CO2 14 04/09/2022    BUN 5 04/09/2022    CREATININE 0.5 04/09/2022    GFRAA >60 04/09/2022    LABGLOM >60 04/09/2022    GLUCOSE 211 04/09/2022    PROT 7.1 04/09/2022    LABALBU 4.6 04/09/2022    CALCIUM 8.6 04/09/2022    BILITOT 2.4 04/09/2022    ALKPHOS 129 04/09/2022     04/09/2022    ALT 56 04/09/2022     Hepatic Function Panel:    Lab Results   Component Value Date    ALKPHOS 129 04/09/2022    ALT 56 04/09/2022     04/09/2022    PROT 7.1 04/09/2022    BILITOT 2.4 04/09/2022    BILIDIR 8.6 10/16/2021    IBILI 2.4 10/16/2021    LABALBU 4.6 04/09/2022       PT/INR:    Lab Results   Component Value Date    PROTIME 11.7 04/08/2022    INR 1.1 04/08/2022     IRON:    Lab Results   Component Value Date    IRON 123 10/15/2021     Iron Saturation:  No components found for: PERCENTFE  FERRITIN:    Lab Results   Component Value Date    FERRITIN 935 10/15/2021     US ABDOMEN LIMITED    Result Date: 4/9/2022  EXAMINATION: RIGHT UPPER QUADRANT ULTRASOUND 4/9/2022 2:37 pm COMPARISON: None. HISTORY: ORDERING SYSTEM PROVIDED HISTORY: Assess liver TECHNOLOGIST PROVIDED HISTORY: Reason for exam:->Assess liver What reading provider will be dictating this exam?->CRC FINDINGS: Pancreas: Unremarkable. Liver: Heterogeneously echogenic liver. Mild hepatomegaly of 19 cm. Hepatopetal flow in the main portal vein. Biliary: Trace nonshadowing gallbladder sludge. Negative sonographic Whittaker's sign per technologist notes. Gallbladder wall thickness 3 mm. .  CBD diameter 3 mm. Right Kidney: No hydronephrosis of the right kidney.  No free fluid is seen in the right upper quadrant. Hepatic steatosis. Mild hepatomegaly of 19 cm. Trace gallbladder sludge. No sonographic evidence of cholelithiasis. Normal caliber CBD. Assessment:     Principal Problem:  ? Elevated LFTs  ? Alcoholic fibrosis   ? Cooper-Danlos disease   ? Hx GERD, gastritis, small hiatal hernia   ? Daily ETOH use/ abuse  ? Hematemesis   ? Thrombocytopenia   ? Constipation       Plan:     ? US liver- noted  ? Lactulose 20 GM TID  ? AFP- pending  ? Liver serology negative  in October last year; including hepatitis panel  ? EGD tomorrow with Dr. Margarito El. All additional questions answered  ? NPO P MN  ? Monitor for DTs, CIWA as ordered per PCP  ? Protonix as ordered  ? Continue IV fluids as ordered per PCP  ? Low fat diet, as tolerated  ? Medical management per Primary Care,including pain management   ? Monitor CMP, CBC, INR daily  ? Alcohol cessation  ? Supportive care  ?  Continue to monitor      Discussed with Dr. Caio Gonzalez per Dr. Pal Levy, NP-C 4/10/2022 10:45 AM For Dr. Margarito El

## 2022-04-09 NOTE — PROGRESS NOTES
Paged  Dr Mihir Sen who is covering for Dr Antonio Castaneda for   admission orders. New orders received.

## 2022-04-09 NOTE — H&P
[address] [address]                              HISTORY AND PHYSICAL    PATIENT NAME: Boo Murdock                  :        1980  MED REC NO:   [id number]                            ROOM:       0410  ACCOUNT NO:   [de-identified]                           ADMIT DATE: 2022  PROVIDER:     Chrissy West MD    DATE OF ADMISSION:  2022. CHIEF COMPLAINT:  Weakness, hematemesis, and vaginal bleeding. HISTORY OF PRESENT ILLNESS:  This is a 18-year-old woman with known  history of alcoholic liver disease, who presented to the emergency room  with complaint of generalized weakness and fatigue. She is also  complaining of several bouts of vomiting, some of which had dark brown  to blackish fluid with it. She also recounts an episode or two, where  she appeared to have some vaginal bleeding. She has not had any prior  history of vaginal bleeding. This has not continued or recurred since  she has been at the hospital.  She did report having several bouts of  vomiting with what appears to be some blood-tinged or coffee-ground  emesis. Again, this has not recurred or continued since being in the  hospital.  She denies any abdominal pain or significant nausea or  vomiting. She denies any melena or hematochezia. She does have known  history of alcoholic liver disease and is [facility] for  this. She has a long history of significant alcohol use. She reports  that she stopped drinking alcohol purposefully the day before admission. Her last drink was the evening of Thursday before 07:00. She states  that she was drinking to a pint _____ of hard liquor daily before that. States she did feel some agitation and irritability yesterday, which  were treated with some Ativan in the emergency room.   Since that time,  the patient denies any problems of feeling agitated or irritable and has  not demonstrated any problems with fluctuating vital signs. Presently,  she is alert and oriented x3. She is calm and not agitated in anyway. She is appropriate all her conversation and does not appear to be in any  sort of distress at this time. Again since admission, she has not had  any further coffee-ground emesis or vaginal bleeding. PAST MEDICAL HISTORY:  Significant for alcoholic liver disease,  depression, and Cooper-Danlos syndrome. She appears to have her  symptoms from Copoer-Danlos are limited to some hyperflexibility of her  joints. PAST SURGICAL HISTORY:  Limited to a tonsillectomy. MEDICATIONS:  Include Nexium 20 mg daily and sertraline 50 mg daily. FAMILY MEDICAL HISTORY:  Noncontributory. SOCIAL HISTORY:  She denies any current smoking history. Alcohol  history as noted. REVIEW OF SYSTEMS:  SKIN:  Reveals no new or changing moles, rashes, or lesions. She does  not have any evidence of obvious jaundice. LUNGS:  No shortness of breath, cough, or wheezing. CARDIOVASCULAR:  No chest pain, palpitations, short of breath, leg  swelling, orthopnea, or PND. GI:  As above. :  No dysuria, hematuria, frequency, or problems with recurrent UTIs. MUSCULOSKELETAL:  She denies any significant osteoarthritis complaints. She does report hypermobile joints due to her history of Cooper-Danlos,  but has not had any significant complications from that otherwise. Denies any claudication. NEUROLOGIC:  No history of CVA or TIA symptoms. No paresthesias. No  headaches. PHYSICAL EXAMINATION:  GENERAL:  This is an alert and oriented woman who is presently in no  acute distress. VITAL SIGNS:  Are stable. She is afebrile. HEENT:  Head:  Normocephalic and atraumatic. Eyes:  PERRLA. Extraocular movements intact without nystagmus. Throat:  Oral and  buccal mucosa are moist without oral ulcer, exudate, or lesion. NECK:  No goiter, bruits, or lymphadenopathy.   CHEST:  Symmetrical excursions with inspiration. BACK:  No CVA or spine tenderness. HEART:  Has a regular rate and rhythm without murmur, rub, gallop, or  JVD. LUNGS:  Clear to auscultation and percussion bilaterally with no  wheezes, rubs, rales, or use of accessory respiratory muscles. ABDOMEN:  Soft, nontender. Positive bowel sounds in all four quadrants. Liver edge is palpable below the right costal margin by a centimeter or  two. It is mildly tender. She has not had any significant  midepigastric tenderness. No masses otherwise were appreciated. EXTREMITIES:  No clubbing, cyanosis, or edema. 2+ pulses in all  peripheral sites with a full range of motion of all her extremities. NEUROLOGIC:  Cranial nerves II through XII are grossly intact with a  grossly normal sensory exam.  Her gait was not assessed at this time as  she reports no gait difficulties. ASSESSMENT AND PLAN:  1. Acute alcohol withdrawal.  The patient presented with one-day  history of no alcohol use with some agitation and irritability. She has  not shown any fluctuations in her heart rates or blood pressures and  does not appear to be in any significant alcoholic withdrawal.  She did  get a dose of Ativan in the emergency room and she says she feels quite  good this morning with no evidence of any significant alcohol withdrawal  symptoms. She is entering day two from previous alcohol use. So, we  will continue to observe her closely. 2.  Hematemesis. The patient does have several episodes of dark brown  to black vomitus. She states that the last time she vomited, which was  prior to admission, it was pink-tinged. She has not had any further  vomiting or hematemesis since admission. She denies any increased  abdominal pain. She has not had any decline in her hemoglobin. I  increased her Nexium from 20 to 40 mg daily _____ her liver disease and  this present GI complaint, we will ask Dr. Scarlett Ferrer to evaluate the  patient. 3.  Vaginal bleeding. The patient reports episodes of vaginal bleeding,  which is a new finding. She states Dr. Dao Richards does her GYN care and so  we defer further evaluation to an outpatient basis with Dr. Dao Richards since  the patient is presently having no ongoing vaginal bleeding. 4.  Thrombocytopenia. The patient has not had a history of  thrombocytopenia, but is noted to be mildly thrombocytopenic with  platelet counts in the 80s. She is also noted to have some mild  neutropenia with a white count around in the low 3s. This is new from  previous _____. This is presumably related to chronic liver disease. We will get input from her liver specialist and if this continues to  progress, she may need evaluation from Hematology. 5.  Hyponatremia. The patient's sodium is currently 131. We will  discontinue her IV fluids and follow this.         Verner Cords, MD    D: 04/09/2022 8:55:08       T: 04/09/2022 9:00:42     [id number]  Job#: [id number]     Doc#: [id number]    CC:  Bella Evans DO

## 2022-04-10 ENCOUNTER — ANESTHESIA EVENT (OUTPATIENT)
Dept: ENDOSCOPY | Age: 42
DRG: 378 | End: 2022-04-10
Payer: COMMERCIAL

## 2022-04-10 ENCOUNTER — ANESTHESIA (OUTPATIENT)
Dept: ENDOSCOPY | Age: 42
DRG: 378 | End: 2022-04-10
Payer: COMMERCIAL

## 2022-04-10 LAB
ALBUMIN SERPL-MCNC: 4.1 G/DL (ref 3.5–5.2)
ALP BLD-CCNC: 131 U/L (ref 35–104)
ALT SERPL-CCNC: 60 U/L (ref 0–32)
ANION GAP SERPL CALCULATED.3IONS-SCNC: 11 MMOL/L (ref 7–16)
AST SERPL-CCNC: 111 U/L (ref 0–31)
BASOPHILS ABSOLUTE: 0.02 E9/L (ref 0–0.2)
BASOPHILS RELATIVE PERCENT: 0.5 % (ref 0–2)
BILIRUB SERPL-MCNC: 1.7 MG/DL (ref 0–1.2)
BUN BLDV-MCNC: 7 MG/DL (ref 6–20)
CALCIUM SERPL-MCNC: 9 MG/DL (ref 8.6–10.2)
CHLORIDE BLD-SCNC: 99 MMOL/L (ref 98–107)
CO2: 22 MMOL/L (ref 22–29)
CREAT SERPL-MCNC: 0.4 MG/DL (ref 0.5–1)
EOSINOPHILS ABSOLUTE: 0.11 E9/L (ref 0.05–0.5)
EOSINOPHILS RELATIVE PERCENT: 2.5 % (ref 0–6)
GFR AFRICAN AMERICAN: >60
GFR NON-AFRICAN AMERICAN: >60 ML/MIN/1.73
GLUCOSE BLD-MCNC: 238 MG/DL (ref 74–99)
HCT VFR BLD CALC: 37.8 % (ref 34–48)
HEMOGLOBIN: 12.6 G/DL (ref 11.5–15.5)
IMMATURE GRANULOCYTES #: 0.03 E9/L
IMMATURE GRANULOCYTES %: 0.7 % (ref 0–5)
LYMPHOCYTES ABSOLUTE: 0.52 E9/L (ref 1.5–4)
LYMPHOCYTES RELATIVE PERCENT: 12 % (ref 20–42)
MCH RBC QN AUTO: 30.7 PG (ref 26–35)
MCHC RBC AUTO-ENTMCNC: 33.3 % (ref 32–34.5)
MCV RBC AUTO: 92.2 FL (ref 80–99.9)
MONOCYTES ABSOLUTE: 0.29 E9/L (ref 0.1–0.95)
MONOCYTES RELATIVE PERCENT: 6.7 % (ref 2–12)
NEUTROPHILS ABSOLUTE: 3.36 E9/L (ref 1.8–7.3)
NEUTROPHILS RELATIVE PERCENT: 77.6 % (ref 43–80)
ORGANISM: ABNORMAL
PDW BLD-RTO: 15.6 FL (ref 11.5–15)
PLATELET # BLD: 72 E9/L (ref 130–450)
PLATELET CONFIRMATION: NORMAL
PMV BLD AUTO: 11.7 FL (ref 7–12)
POTASSIUM SERPL-SCNC: 3.6 MMOL/L (ref 3.5–5)
RBC # BLD: 4.1 E12/L (ref 3.5–5.5)
SODIUM BLD-SCNC: 132 MMOL/L (ref 132–146)
TOTAL PROTEIN: 6.8 G/DL (ref 6.4–8.3)
URINE CULTURE, ROUTINE: ABNORMAL
WBC # BLD: 4.3 E9/L (ref 4.5–11.5)

## 2022-04-10 PROCEDURE — 6370000000 HC RX 637 (ALT 250 FOR IP): Performed by: INTERNAL MEDICINE

## 2022-04-10 PROCEDURE — 85025 COMPLETE CBC W/AUTO DIFF WBC: CPT

## 2022-04-10 PROCEDURE — 82105 ALPHA-FETOPROTEIN SERUM: CPT

## 2022-04-10 PROCEDURE — 6360000002 HC RX W HCPCS: Performed by: EMERGENCY MEDICINE

## 2022-04-10 PROCEDURE — 2580000003 HC RX 258: Performed by: EMERGENCY MEDICINE

## 2022-04-10 PROCEDURE — 96376 TX/PRO/DX INJ SAME DRUG ADON: CPT

## 2022-04-10 PROCEDURE — G0378 HOSPITAL OBSERVATION PER HR: HCPCS

## 2022-04-10 PROCEDURE — 36415 COLL VENOUS BLD VENIPUNCTURE: CPT

## 2022-04-10 PROCEDURE — 2060000000 HC ICU INTERMEDIATE R&B

## 2022-04-10 PROCEDURE — 80053 COMPREHEN METABOLIC PANEL: CPT

## 2022-04-10 RX ADMIN — OCTREOTIDE ACETATE 50 MCG/HR: 500 INJECTION, SOLUTION INTRAVENOUS; SUBCUTANEOUS at 07:21

## 2022-04-10 RX ADMIN — SODIUM CHLORIDE, PRESERVATIVE FREE 10 ML: 5 INJECTION INTRAVENOUS at 20:01

## 2022-04-10 RX ADMIN — OCTREOTIDE ACETATE 50 MCG/HR: 500 INJECTION, SOLUTION INTRAVENOUS; SUBCUTANEOUS at 17:28

## 2022-04-10 RX ADMIN — SODIUM CHLORIDE, PRESERVATIVE FREE 10 ML: 5 INJECTION INTRAVENOUS at 08:25

## 2022-04-10 RX ADMIN — LANSOPRAZOLE 30 MG: KIT at 05:50

## 2022-04-10 ASSESSMENT — PAIN SCALES - GENERAL
PAINLEVEL_OUTOF10: 0

## 2022-04-10 ASSESSMENT — LIFESTYLE VARIABLES: SMOKING_STATUS: 1

## 2022-04-10 NOTE — PROGRESS NOTES
Subjective: The patient is awake and alert. No problems overnight. Denies chest pain, angina, and dyspnea. Denies abdominal pain. Tolerating diet. No nausea or vomiting. No further hematemesis. No further vaginal bleeding. Scheduled for EGD in am,. Had a BM this am-- no melena or blood noted. No agitation, irritability, or fluctuations in vitals to suggest alcohol withdrawal syndrome. Morning labs still pending    Objective:    /85   Pulse 94   Temp 98.3 °F (36.8 °C) (Oral)   Resp 18   Ht 5' 2\" (1.575 m)   Wt 137 lb (62.1 kg)   SpO2 100%   BMI 25.06 kg/m²   Neck: No goiter, bruit, or LA  Heart:  RRR, no murmurs, gallops, or rubs.   Lungs:  CTA bilaterally, no wheeze, rales or rhonchi  Abd: bowel sounds present, nontender, nondistended, no masses  Extrem:  No clubbing, cyanosis, or edema, 2+ peripheral pulses, FROM    CBC:   Lab Results   Component Value Date    WBC 3.5 04/09/2022    RBC 4.11 04/09/2022    HGB 12.5 04/09/2022    HCT 39.8 04/09/2022    MCV 96.8 04/09/2022    MCH 30.4 04/09/2022    MCHC 31.4 04/09/2022    RDW 16.3 04/09/2022    PLT 69 04/09/2022    MPV 11.6 04/09/2022     CMP:    Lab Results   Component Value Date     04/09/2022    K 5.0 04/09/2022    K 4.0 04/08/2022    CL 98 04/09/2022    CO2 14 04/09/2022    BUN 5 04/09/2022    CREATININE 0.5 04/09/2022    GFRAA >60 04/09/2022    LABGLOM >60 04/09/2022    GLUCOSE 211 04/09/2022    PROT 7.1 04/09/2022    LABALBU 4.6 04/09/2022    CALCIUM 8.6 04/09/2022    BILITOT 2.4 04/09/2022    ALKPHOS 129 04/09/2022     04/09/2022    ALT 56 04/09/2022          Current Facility-Administered Medications:     lactulose (CHRONULAC) 10 GM/15ML solution 20 g, 20 g, Oral, BID, Aislinn A Sugar, APRN - CNP, 20 g at 04/09/22 2021    nicotine (NICODERM CQ) 14 MG/24HR 1 patch, 1 patch, TransDERmal, Daily, Lazarus Neat, MD, 1 patch at 04/10/22 0825    octreotide (SANDOSTATIN) 500 mcg in sodium chloride 0.9 % 100 mL infusion, 50 mcg/hr, IntraVENous, Continuous, Samuel Lott MD, Last Rate: 10 mL/hr at 04/10/22 0721, 50 mcg/hr at 04/10/22 0721    sodium chloride flush 0.9 % injection 5-40 mL, 5-40 mL, IntraVENous, 2 times per day, Samuel Lott MD, 10 mL at 04/10/22 0825    sodium chloride flush 0.9 % injection 5-40 mL, 5-40 mL, IntraVENous, PRN, Samuel Lott MD    0.9 % sodium chloride infusion, , IntraVENous, PRN, Samuel Lott MD    LORazepam (ATIVAN) tablet 1 mg, 1 mg, Oral, Q1H PRN, 1 mg at 04/09/22 1353 **OR** LORazepam (ATIVAN) injection 1 mg, 1 mg, IntraVENous, Q1H PRN **OR** LORazepam (ATIVAN) tablet 2 mg, 2 mg, Oral, Q1H PRN **OR** LORazepam (ATIVAN) injection 2 mg, 2 mg, IntraVENous, Q1H PRN, 2 mg at 04/08/22 1334 **OR** LORazepam (ATIVAN) tablet 3 mg, 3 mg, Oral, Q1H PRN **OR** LORazepam (ATIVAN) injection 3 mg, 3 mg, IntraVENous, Q1H PRN **OR** LORazepam (ATIVAN) tablet 4 mg, 4 mg, Oral, Q1H PRN **OR** LORazepam (ATIVAN) injection 4 mg, 4 mg, IntraVENous, Q1H PRN, Samuel Lott MD    sertraline (ZOLOFT) tablet 50 mg, 50 mg, Oral, Daily, Vic Jauregui MD    lansoprazole suspension SUSP 30 mg, 30 mg, Oral, QAM AC, Vic Jauregui MD, 30 mg at 04/10/22 0550    Assessment:    Patient Active Problem List   Diagnosis    Acute liver failure without hepatic coma    Hyperbilirubinemia    Acute alcoholic hepatitis    Hematemesis       Plan:  1. Alcoholic liver disease- she is currently alcohol free x 2-3 days, though she had resumed heavy drinking leading up to this admission. Increase in LFT's, thrombocytopenia likely reflecting liver disease. U/s showed hepatic steatosis and mild hepatomegaly but no other abnormalities. She is certainly aware that she cannot drink any alcohol moving forward. GI on case  2. UGI bleed- pt presented with coffee ground emesis. No recurrence since admission. For EGD in am. On octreotide and PPI. 3. No signs of alcohol withdrawal thus far  4. Vaginal bleeding- no recurrence.  Will need outpt evaluation  5. Hyponatremia- no signs of volume overload. AM sodium level is pending  5. Thrombocytopenia- due to liver disease. Follow.          Dieudonne Vidal MD  10:01 AM  4/10/2022

## 2022-04-10 NOTE — ANESTHESIA PRE PROCEDURE
Department of Anesthesiology  Preprocedure Note       Name:  Hilary Soares   Age:  36 y.o.  :  1980                                          MRN:  [id number]         Date:  4/10/2022      Surgeon: Mechelle Brice    Procedure: EGD    Medications prior to admission:   Prior to Admission medications    Medication Sig Start Date End Date Taking?  Authorizing Provider   esomeprazole Magnesium (NEXIUM) 20 MG PACK Take 20 mg by mouth daily   Yes Historical Provider, MD   sertraline (ZOLOFT) 50 MG tablet Take 50 mg by mouth daily Indications: patient only took one dose of this didnt like how she felt and stopped taking it   Yes Historical Provider, MD       Current medications:    Current Facility-Administered Medications   Medication Dose Route Frequency Provider Last Rate Last Admin    lactulose (CHRONULAC) 10 GM/15ML solution 20 g  20 g Oral BID Aislinn A Sugar, APRN - CNP   20 g at 22    nicotine (NICODERM CQ) 14 MG/24HR 1 patch  1 patch TransDERmal Daily Onel Son MD   1 patch at 22    octreotide (SANDOSTATIN) 500 mcg in sodium chloride 0.9 % 100 mL infusion  50 mcg/hr IntraVENous Continuous Chase Santos MD 10 mL/hr at 22 50 mcg/hr at 22    sodium chloride flush 0.9 % injection 5-40 mL  5-40 mL IntraVENous 2 times per day Chase Santos MD   10 mL at 22    sodium chloride flush 0.9 % injection 5-40 mL  5-40 mL IntraVENous PRN Chase Santos MD        0.9 % sodium chloride infusion   IntraVENous PRN Chase Santos MD        LORazepam (ATIVAN) tablet 1 mg  1 mg Oral Q1H PRN Chase Santos MD   1 mg at 22 1353    Or    LORazepam (ATIVAN) injection 1 mg  1 mg IntraVENous Q1H PRN Chase Santos MD        Or    LORazepam (ATIVAN) tablet 2 mg  2 mg Oral Q1H PRN Chase Santos MD        Or    LORazepam (ATIVAN) injection 2 mg  2 mg IntraVENous Q1H PRN Chase Santos MD   2 mg at 22 1334    Or    LORazepam (ATIVAN) tablet 3 mg  3 mg Oral Q1H PRN Tang Medina Shahram Novoa MD        Or    LORazepam (ATIVAN) injection 3 mg  3 mg IntraVENous Q1H PRN Sofia Barrientos MD        Or    LORazepam (ATIVAN) tablet 4 mg  4 mg Oral Q1H PRN Sofia Barrientos MD        Or    LORazepam (ATIVAN) injection 4 mg  4 mg IntraVENous Q1H PRN Sofia Barrientos MD        sertraline (ZOLOFT) tablet 50 mg  50 mg Oral Daily Candido Kraft MD        lansoprazole suspension SUSP 30 mg  30 mg Oral QAM AC Candido Kraft MD   30 mg at 04/10/22 0550       Allergies:     Allergies   Allergen Reactions    Penicillins Anaphylaxis    Erythromycin Hives       Problem List:    Patient Active Problem List   Diagnosis Code    Acute liver failure without hepatic coma K72.00    Hyperbilirubinemia E80.6    Acute alcoholic hepatitis L93.18    Hematemesis K92.0       Past Medical History:        Diagnosis Date    Cooper-Danlos disease        Past Surgical History:        Procedure Laterality Date    TONSILLECTOMY         Social History:    Social History     Tobacco Use    Smoking status: Current Every Day Smoker     Packs/day: 1.00    Smokeless tobacco: Never Used   Substance Use Topics    Alcohol use: Not Currently     Comment: not for 7 days as of 10/20/21                                Ready to quit: Not Answered  Counseling given: Not Answered      Vital Signs (Current):   Vitals:    04/09/22 0930 04/09/22 1545 04/09/22 2015 04/10/22 0355   BP: (!) 136/93 (!) 148/96 125/85 (!) 133/90   Pulse: 86 94 86 97   Resp: 18 18 18 18   Temp: 98 °F (36.7 °C) 98.1 °F (36.7 °C) 98.4 °F (36.9 °C) 98.3 °F (36.8 °C)   TempSrc: Oral Oral Oral Oral   SpO2: 100% 100% 98% 99%   Weight:       Height:                                                  BP Readings from Last 3 Encounters:   04/10/22 (!) 133/90   10/23/21 108/69   10/16/21 (!) 123/92       NPO Status:                                                                                 BMI:   Wt Readings from Last 3 Encounters:   04/08/22 137 lb (62.1 kg)   10/20/21 148 lb (67.1 kg)   10/15/21 145 lb 8 oz (66 kg)     Body mass index is 25.06 kg/m². CBC:   Lab Results   Component Value Date    WBC 3.5 04/09/2022    RBC 4.11 04/09/2022    HGB 12.5 04/09/2022    HCT 39.8 04/09/2022    MCV 96.8 04/09/2022    RDW 16.3 04/09/2022    PLT 69 04/09/2022       CMP:   Lab Results   Component Value Date     04/09/2022    K 5.0 04/09/2022    K 4.0 04/08/2022    CL 98 04/09/2022    CO2 14 04/09/2022    BUN 5 04/09/2022    CREATININE 0.5 04/09/2022    GFRAA >60 04/09/2022    LABGLOM >60 04/09/2022    GLUCOSE 211 04/09/2022    PROT 7.1 04/09/2022    CALCIUM 8.6 04/09/2022    BILITOT 2.4 04/09/2022    ALKPHOS 129 04/09/2022     04/09/2022    ALT 56 04/09/2022       POC Tests: No results for input(s): POCGLU, POCNA, POCK, POCCL, POCBUN, POCHEMO, POCHCT in the last 72 hours. Coags:   Lab Results   Component Value Date    PROTIME 11.7 04/08/2022    INR 1.1 04/08/2022    APTT 31.9 04/08/2022       HCG (If Applicable):   Lab Results   Component Value Date    PREGTESTUR NEGATIVE 04/08/2022        ABGs: No results found for: PHART, PO2ART, MTF1MTB, IEY3CHQ, BEART, R0YOHRCR     Type & Screen (If Applicable):  No results found for: LABABO, LABRH    Drug/Infectious Status (If Applicable):  No results found for: HIV, HEPCAB    COVID-19 Screening (If Applicable): No results found for: COVID19        Anesthesia Evaluation  Patient summary reviewed and Nursing notes reviewed no history of anesthetic complications:   Airway: Mallampati: III  TM distance: >3 FB   Neck ROM: full   Dental:          Pulmonary: breath sounds clear to auscultation  (+) current smoker          Patient did not smoke on day of surgery.                  Cardiovascular:  Exercise tolerance: good (>4 METS),           Rhythm: regular  Rate: normal           Beta Blocker:  Not on Beta Blocker         Neuro/Psych:   Negative Neuro/Psych ROS  (+) depression/anxiety             GI/Hepatic/Renal:   (+) GERD:, hepatitis (Acute alcoholic hepatitis with hematemesis):, liver disease (alcoholic liver disease):,          ROS comment: Hyperbilirubinemia  Acute alcoholic hepatitis  Hematemesis    . Endo/Other:              Pt had no PAT visit        ROS comment: Cooper-Danlos syndrome  ETOH abuse (former) Abdominal:         (-) obese       Vascular: negative vascular ROS. Other Findings:           Anesthesia Plan      MAC     ASA 4       Induction: intravenous. Anesthetic plan and risks discussed with patient. Plan discussed with CRNA. Hailey Kay MD   4/10/2022      DOS STAFF ADDENDUM:    Patient seen and examined, physical exam updated as needed, chart reviewed. NPO status confirmed. Anesthetic options and risks discussed with patient/legal guardian. Patient/legal guardian verbalized understanding and agrees to proceed. Hailey Kay MD  Staff Anesthesiologist  April 10, 2022  7:17 AM      DOS STAFF ADDENDUM:    Patient seen and chart reviewed on DOS. No interval change in history or exam.   I agree with the anesthesia pre-operative assessment written above and have made the appropriate addendums and/or changes. Anesthesia plan discussed and risks/benefits addressed. Patient's concerns and questions answered. NPO >8 hours.      Gerda Dancer, DO  April 11, 2022  12:25 PM

## 2022-04-11 VITALS
DIASTOLIC BLOOD PRESSURE: 69 MMHG | RESPIRATION RATE: 19 BRPM | OXYGEN SATURATION: 100 % | SYSTOLIC BLOOD PRESSURE: 102 MMHG

## 2022-04-11 VITALS
DIASTOLIC BLOOD PRESSURE: 86 MMHG | HEIGHT: 62 IN | TEMPERATURE: 97.9 F | SYSTOLIC BLOOD PRESSURE: 123 MMHG | RESPIRATION RATE: 15 BRPM | HEART RATE: 77 BPM | BODY MASS INDEX: 25.21 KG/M2 | WEIGHT: 137 LBS | OXYGEN SATURATION: 100 %

## 2022-04-11 PROBLEM — K92.0 HEMATEMESIS: Status: RESOLVED | Noted: 2022-04-08 | Resolved: 2022-04-11

## 2022-04-11 LAB
ALBUMIN SERPL-MCNC: 3.9 G/DL (ref 3.5–5.2)
ALP BLD-CCNC: 132 U/L (ref 35–104)
ALT SERPL-CCNC: 105 U/L (ref 0–32)
ANION GAP SERPL CALCULATED.3IONS-SCNC: 11 MMOL/L (ref 7–16)
AST SERPL-CCNC: 295 U/L (ref 0–31)
BASOPHILS ABSOLUTE: 0.01 E9/L (ref 0–0.2)
BASOPHILS RELATIVE PERCENT: 0.3 % (ref 0–2)
BILIRUB SERPL-MCNC: 1.6 MG/DL (ref 0–1.2)
BUN BLDV-MCNC: 8 MG/DL (ref 6–20)
CALCIUM SERPL-MCNC: 9.5 MG/DL (ref 8.6–10.2)
CHLORIDE BLD-SCNC: 101 MMOL/L (ref 98–107)
CO2: 26 MMOL/L (ref 22–29)
CREAT SERPL-MCNC: 0.5 MG/DL (ref 0.5–1)
EOSINOPHILS ABSOLUTE: 0.12 E9/L (ref 0.05–0.5)
EOSINOPHILS RELATIVE PERCENT: 3.8 % (ref 0–6)
GFR AFRICAN AMERICAN: >60
GFR NON-AFRICAN AMERICAN: >60 ML/MIN/1.73
GLUCOSE BLD-MCNC: 149 MG/DL (ref 74–99)
HBA1C MFR BLD: 4.4 % (ref 4–5.6)
HCT VFR BLD CALC: 37.4 % (ref 34–48)
HEMOGLOBIN: 12.4 G/DL (ref 11.5–15.5)
IMMATURE GRANULOCYTES #: 0.02 E9/L
IMMATURE GRANULOCYTES %: 0.6 % (ref 0–5)
INR BLD: 1.2
LYMPHOCYTES ABSOLUTE: 0.96 E9/L (ref 1.5–4)
LYMPHOCYTES RELATIVE PERCENT: 30.6 % (ref 20–42)
MCH RBC QN AUTO: 30.8 PG (ref 26–35)
MCHC RBC AUTO-ENTMCNC: 33.2 % (ref 32–34.5)
MCV RBC AUTO: 93 FL (ref 80–99.9)
MONOCYTES ABSOLUTE: 0.19 E9/L (ref 0.1–0.95)
MONOCYTES RELATIVE PERCENT: 6.1 % (ref 2–12)
NEUTROPHILS ABSOLUTE: 1.84 E9/L (ref 1.8–7.3)
NEUTROPHILS RELATIVE PERCENT: 58.6 % (ref 43–80)
PDW BLD-RTO: 15.7 FL (ref 11.5–15)
PLATELET # BLD: 69 E9/L (ref 130–450)
PLATELET CONFIRMATION: NORMAL
PMV BLD AUTO: 12 FL (ref 7–12)
POTASSIUM SERPL-SCNC: 3.9 MMOL/L (ref 3.5–5)
PROTHROMBIN TIME: 12.7 SEC (ref 9.3–12.4)
RBC # BLD: 4.02 E12/L (ref 3.5–5.5)
SODIUM BLD-SCNC: 138 MMOL/L (ref 132–146)
TOTAL PROTEIN: 6.1 G/DL (ref 6.4–8.3)
WBC # BLD: 3.1 E9/L (ref 4.5–11.5)

## 2022-04-11 PROCEDURE — 87081 CULTURE SCREEN ONLY: CPT

## 2022-04-11 PROCEDURE — 85025 COMPLETE CBC W/AUTO DIFF WBC: CPT

## 2022-04-11 PROCEDURE — 6360000002 HC RX W HCPCS: Performed by: NURSE ANESTHETIST, CERTIFIED REGISTERED

## 2022-04-11 PROCEDURE — 2580000003 HC RX 258: Performed by: ANESTHESIOLOGY

## 2022-04-11 PROCEDURE — 0DB68ZX EXCISION OF STOMACH, VIA NATURAL OR ARTIFICIAL OPENING ENDOSCOPIC, DIAGNOSTIC: ICD-10-PCS | Performed by: INTERNAL MEDICINE

## 2022-04-11 PROCEDURE — 6370000000 HC RX 637 (ALT 250 FOR IP): Performed by: INTERNAL MEDICINE

## 2022-04-11 PROCEDURE — 7100000011 HC PHASE II RECOVERY - ADDTL 15 MIN: Performed by: INTERNAL MEDICINE

## 2022-04-11 PROCEDURE — 36415 COLL VENOUS BLD VENIPUNCTURE: CPT

## 2022-04-11 PROCEDURE — 3700000000 HC ANESTHESIA ATTENDED CARE: Performed by: INTERNAL MEDICINE

## 2022-04-11 PROCEDURE — 2580000003 HC RX 258: Performed by: EMERGENCY MEDICINE

## 2022-04-11 PROCEDURE — 6360000002 HC RX W HCPCS: Performed by: EMERGENCY MEDICINE

## 2022-04-11 PROCEDURE — 85610 PROTHROMBIN TIME: CPT

## 2022-04-11 PROCEDURE — 80053 COMPREHEN METABOLIC PANEL: CPT

## 2022-04-11 PROCEDURE — 7100000010 HC PHASE II RECOVERY - FIRST 15 MIN: Performed by: INTERNAL MEDICINE

## 2022-04-11 PROCEDURE — 2709999900 HC NON-CHARGEABLE SUPPLY: Performed by: INTERNAL MEDICINE

## 2022-04-11 PROCEDURE — 83036 HEMOGLOBIN GLYCOSYLATED A1C: CPT

## 2022-04-11 PROCEDURE — G0378 HOSPITAL OBSERVATION PER HR: HCPCS

## 2022-04-11 PROCEDURE — 3609012400 HC EGD TRANSORAL BIOPSY SINGLE/MULTIPLE: Performed by: INTERNAL MEDICINE

## 2022-04-11 RX ORDER — MEPERIDINE HYDROCHLORIDE 25 MG/ML
12.5 INJECTION INTRAMUSCULAR; INTRAVENOUS; SUBCUTANEOUS EVERY 5 MIN PRN
Status: DISCONTINUED | OUTPATIENT
Start: 2022-04-11 | End: 2022-04-11 | Stop reason: HOSPADM

## 2022-04-11 RX ORDER — LACTULOSE 10 G/15ML
20 SOLUTION ORAL 2 TIMES DAILY
Qty: 1800 ML | Refills: 0 | Status: SHIPPED | OUTPATIENT
Start: 2022-04-11 | End: 2022-04-11

## 2022-04-11 RX ORDER — NICOTINE 21 MG/24HR
1 PATCH, TRANSDERMAL 24 HOURS TRANSDERMAL DAILY
Qty: 30 PATCH | Refills: 3 | Status: SHIPPED | OUTPATIENT
Start: 2022-04-12 | End: 2022-04-11

## 2022-04-11 RX ORDER — SODIUM CHLORIDE 0.9 % (FLUSH) 0.9 %
5-40 SYRINGE (ML) INJECTION EVERY 12 HOURS SCHEDULED
Status: DISCONTINUED | OUTPATIENT
Start: 2022-04-11 | End: 2022-04-11 | Stop reason: SDUPTHER

## 2022-04-11 RX ORDER — PANTOPRAZOLE SODIUM 40 MG/1
40 TABLET, DELAYED RELEASE ORAL
Status: DISCONTINUED | OUTPATIENT
Start: 2022-04-11 | End: 2022-04-11 | Stop reason: HOSPADM

## 2022-04-11 RX ORDER — SODIUM CHLORIDE 9 MG/ML
INJECTION, SOLUTION INTRAVENOUS PRN
Status: DISCONTINUED | OUTPATIENT
Start: 2022-04-11 | End: 2022-04-11 | Stop reason: HOSPADM

## 2022-04-11 RX ORDER — PANTOPRAZOLE SODIUM 40 MG/1
40 TABLET, DELAYED RELEASE ORAL
Qty: 30 TABLET | Refills: 3 | Status: SHIPPED | OUTPATIENT
Start: 2022-04-11

## 2022-04-11 RX ORDER — NICOTINE 21 MG/24HR
1 PATCH, TRANSDERMAL 24 HOURS TRANSDERMAL DAILY
Qty: 30 PATCH | Refills: 3 | Status: SHIPPED | OUTPATIENT
Start: 2022-04-12

## 2022-04-11 RX ORDER — DIPHENHYDRAMINE HYDROCHLORIDE 50 MG/ML
12.5 INJECTION INTRAMUSCULAR; INTRAVENOUS
Status: DISCONTINUED | OUTPATIENT
Start: 2022-04-11 | End: 2022-04-11 | Stop reason: HOSPADM

## 2022-04-11 RX ORDER — SODIUM CHLORIDE 0.9 % (FLUSH) 0.9 %
5-40 SYRINGE (ML) INJECTION PRN
Status: DISCONTINUED | OUTPATIENT
Start: 2022-04-11 | End: 2022-04-11 | Stop reason: SDUPTHER

## 2022-04-11 RX ORDER — PROPOFOL 10 MG/ML
INJECTION, EMULSION INTRAVENOUS PRN
Status: DISCONTINUED | OUTPATIENT
Start: 2022-04-11 | End: 2022-04-11 | Stop reason: SDUPTHER

## 2022-04-11 RX ORDER — LACTULOSE 10 G/15ML
20 SOLUTION ORAL 2 TIMES DAILY
Qty: 1800 ML | Refills: 0 | Status: SHIPPED | OUTPATIENT
Start: 2022-04-11 | End: 2022-05-11

## 2022-04-11 RX ORDER — FENTANYL CITRATE 50 UG/ML
25 INJECTION, SOLUTION INTRAMUSCULAR; INTRAVENOUS EVERY 5 MIN PRN
Status: DISCONTINUED | OUTPATIENT
Start: 2022-04-11 | End: 2022-04-11 | Stop reason: HOSPADM

## 2022-04-11 RX ORDER — PANTOPRAZOLE SODIUM 40 MG/1
40 TABLET, DELAYED RELEASE ORAL
Qty: 30 TABLET | Refills: 3 | Status: SHIPPED | OUTPATIENT
Start: 2022-04-11 | End: 2022-04-11

## 2022-04-11 RX ADMIN — SODIUM CHLORIDE: 9 INJECTION, SOLUTION INTRAVENOUS at 12:39

## 2022-04-11 RX ADMIN — OCTREOTIDE ACETATE 50 MCG/HR: 500 INJECTION, SOLUTION INTRAVENOUS; SUBCUTANEOUS at 13:11

## 2022-04-11 RX ADMIN — OCTREOTIDE ACETATE 50 MCG/HR: 500 INJECTION, SOLUTION INTRAVENOUS; SUBCUTANEOUS at 03:36

## 2022-04-11 RX ADMIN — PROPOFOL 220 MG: 10 INJECTION, EMULSION INTRAVENOUS at 12:40

## 2022-04-11 ASSESSMENT — ENCOUNTER SYMPTOMS
SHORTNESS OF BREATH: 0
NAUSEA: 0
DIARRHEA: 0
VOMITING: 0
COUGH: 0

## 2022-04-11 ASSESSMENT — PAIN SCALES - GENERAL
PAINLEVEL_OUTOF10: 0
PAINLEVEL_OUTOF10: 0

## 2022-04-11 NOTE — PROGRESS NOTES
Patient for EGD with possible banding today with Dr. Saravia for hematemesis, none since Friday, acute alcoholic hepatitis, ETOH; hx GERD, gastritis, sm HH, thrombocytopenia and Cooper-Danlos disease  Additional questions and concerns addressed  Labs/chart reviewed  Consent signed  Miryam Duet to proceed.      Nayeli Officer, APRN - CNP 4/11/2022 9:52 AM

## 2022-04-11 NOTE — BRIEF OP NOTE
Brief Postoperative Note    Hilary Soares  YOB: 1980  [id number]    Procedure: EGD with biopsy    Anesthesia: [facility]    Surgeon:  Gissell Bucio MD    Findings:       Esophagus:  GERD.  NO VARICES      Stomach:  Gastritis bx done to rule out H. Pylori      Duodenum:  Duodenitis , small ulcer in bulb     Complications: None      Estimated blood loss: none      Alen Brown MD

## 2022-04-11 NOTE — DISCHARGE SUMMARY
Discharge Summary     Patient ID:  [facility]  [id number]  1 y.o. 1980 female  Grey Monsivais DO        Admit date: 4/8/2022    Discharge date and time:  4/11/2022  2:29 PM      Activity level: As tolerated  Diet: Low fat diet obstain from alcohol. Labs:None   Disposition: Home   Condition on Discharge: Stable  DME: None     Admit Diagnoses:   Patient Active Problem List   Diagnosis    Acute liver failure without hepatic coma    Hyperbilirubinemia    Acute alcoholic hepatitis       Discharge Diagnoses: Principal Problem (Resolved):    Hematemesis  Active Problems:    * No active hospital problems. *      Consults:  IP CONSULT TO SOCIAL WORK  IP CONSULT TO INTERNAL MEDICINE  IP CONSULT TO GI  IP CONSULT TO GI    Procedures:  EGD on 4/11/22    Hospital Course: Patient is a 36year old female with alcohol addiction who presented to er due to weakness and fatigue. She had several bouts of vomiting with dark brown to black fluid in it. She had a couple of episodes of vaginal bleeding. Patient was admitted and started on PPI and octreotide. She was seen by GI. Her hemoglobin remained stable. She had a thrombocytopenia which is new and thought to be secondary to alcoholic liver disease. She was taken for EGD on Monday which revealed no varices and gastritis. Her PPI was changed to protonix bid. For vaginal bleeding she will need transvaginal ultrasound as outpatient. Patient was discharged home in stable condition. She will follow up in 1 week and follow up with GI at [facility]. Discharge Exam:  General Appearance:  Comfortable, well-appearing and in no acute distress. Vital signs: (most recent): Blood pressure 124/89, pulse 82, temperature 97.8 °F (36.6 °C), temperature source Oral, resp. rate 18, height 5' 2\" (1.575 m), weight 137 lb (62.1 kg), SpO2 99 %, not currently breastfeeding. Lungs:  Normal effort and normal respiratory rate. Breath sounds clear to auscultation. No rales or rhonchi. Heart: Normal rate. Regular rhythm. S1 normal and S2 normal.  No friction rub. Abdomen: Abdomen is soft and non-distended. Bowel sounds are normal.   There is no abdominal tenderness. There is no rebound tenderness. There is no guarding. Neurological: Patient is alert. Skin:  Warm and dry.       No intake/output data recorded. I/O this shift:  In: 100 [I.V.:100]  Out: -       LABS:  Recent Labs     04/09/22  0254 04/10/22  1013 04/11/22  0250   * 132 138   K 5.0 3.6 3.9   CL 98 99 101   CO2 14* 22 26   BUN 5* 7 8   CREATININE 0.5 0.4* 0.5   GLUCOSE 211* 238* 149*   CALCIUM 8.6 9.0 9.5       Recent Labs     04/09/22  0254 04/10/22  1013 04/11/22  0250   WBC 3.5* 4.3* 3.1*   RBC 4.11 4.10 4.02   HGB 12.5 12.6 12.4   HCT 39.8 37.8 37.4   MCV 96.8 92.2 93.0   MCH 30.4 30.7 30.8   MCHC 31.4* 33.3 33.2   RDW 16.3* 15.6* 15.7*   PLT 69* 72* 69*   MPV 11.6 11.7 12.0       No results for input(s): GLUMET in the last 72 hours. Imaging:  US ABDOMEN LIMITED    Result Date: 4/9/2022  EXAMINATION: RIGHT UPPER QUADRANT ULTRASOUND 4/9/2022 2:37 pm COMPARISON: None. HISTORY: ORDERING SYSTEM PROVIDED HISTORY: Assess liver TECHNOLOGIST PROVIDED HISTORY: Reason for exam:->Assess liver What reading provider will be dictating this exam?->CRC FINDINGS: Pancreas: Unremarkable. Liver: Heterogeneously echogenic liver. Mild hepatomegaly of 19 cm. Hepatopetal flow in the main portal vein. Biliary: Trace nonshadowing gallbladder sludge. Negative sonographic Whittaker's sign per technologist notes. Gallbladder wall thickness 3 mm. .  CBD diameter 3 mm. Right Kidney: No hydronephrosis of the right kidney. No free fluid is seen in the right upper quadrant. Hepatic steatosis. Mild hepatomegaly of 19 cm. Trace gallbladder sludge. No sonographic evidence of cholelithiasis. Normal caliber CBD.        Patient Instructions:   Current Discharge Medication List      START taking these medications    Details   lactulose (CHRONULAC) 10 GM/15ML solution Take 30 mLs by mouth 2 times daily  Qty: 1800 mL, Refills: 0      nicotine (NICODERM CQ) 14 MG/24HR Place 1 patch onto the skin daily  Qty: 30 patch, Refills: 3      pantoprazole (PROTONIX) 40 MG tablet Take 1 tablet by mouth 2 times daily (before meals)  Qty: 30 tablet, Refills: 3         CONTINUE these medications which have NOT CHANGED    Details   sertraline (ZOLOFT) 50 MG tablet Take 50 mg by mouth daily Indications: patient only took one dose of this didnt like how she felt and stopped taking it         STOP taking these medications       esomeprazole Magnesium (NEXIUM) 20 MG PACK Comments:   Reason for Stopping:                 Note that more than 30 minutes was spent in preparing discharge papers, discussing discharge with patient, medication review, etc.      Signed:    Paul Donohue DO    Electronically signed by Paul Donohue DO on 4/11/2022 at 2:29 PM

## 2022-04-11 NOTE — OP NOTE
[address] [address]                                OPERATIVE REPORT    PATIENT NAME: Luna Garcia                  :        1980  MED REC NO:   [id number]                            ROOM:       0410  ACCOUNT NO:   [de-identified]                           ADMIT DATE: 2022  PROVIDER:     Dony Chicas MD    DATE OF PROCEDURE:  2022    PREOPERATIVE DIAGNOSIS:  Evaluation for hematemesis in a patient with a  long history of alcohol use. POSTOPERATIVE DIAGNOSES:  Grade B LA classification GERD. No varices  seen. Stomach showed antritis. Biopsied to rule out H. pylori  infection. Duodenum was unremarkable. No gastric varices seen. OPERATION:  Upper endoscopy with biopsy. SURGEON:  Dony Chicas M.D. ANESTHESIA:  Local monitored anesthesia care. NOTE:  Prior to the procedure an informed consent was obtained from the  patient after explaining the benefits as well as the risks,  alternatives, and complications of the procedure to the patient, who  understood and agreed. DESCRIPTION OF PROCEDURE:  With the patient in the left lateral  decubitus position, the Olympus GIF-100 forward-viewing videoscope was  introduced into the esophagus, the evaluation of which showed grade B LA  classification GERD and no hiatal hernia was seen. No varices seen. The scope was then advanced through the gastroesophageal junction into  the gastric body, along the greater curvature. Evaluation of the body  of the stomach showed gastritis. Biopsies were taken from this area to  rule out Helicobacter pylori infection. No gastric varices seen. The scope was then advanced through the pylorus into the duodenal bulb  and second portion of the duodenum, both of which appeared to be  unremarkable.   The scope was then retrieved and retroflexed in the  prepyloric antrum, with thorough evaluation of the cardiac and fundal  portions of the stomach, which appeared to be within normal limits. The scope was then straightened, the area deflated, and the procedure  was terminated by withdrawing the scope and conducting a second look on  the way out, which was essentially the same. The patient tolerated the procedure well.         Yoav Johnson MD    D: 04/11/2022 13:32:24       T: 04/11/2022 13:35:55     [email or web address]  Job#: [id number]     Doc#: [id number]    CC:  Bipin Joya MD

## 2022-04-11 NOTE — CARE COORDINATION
4/11/2022. Social Work Discharge Planning: EGD today. This worker met with Pt to discuss  role and transition of care/discharge planning. Pt is from home with her spouse. Pt resides in a 2 story home and is independent with no DME. Room air. Pt sees a counselor via [address] [place] [address] and is declining PRS. Pt is also going to look into free behavioral health counseling through her insurance. Electronically signed by LADARIUS Haywood on 4/11/2022 at 11:26 AM

## 2022-04-11 NOTE — ANESTHESIA POSTPROCEDURE EVALUATION
Department of Anesthesiology  Postprocedure Note    Patient: Flo Lopez  MRN: [id number]  YOB: 1980  Date of evaluation: 4/11/2022  Time:  1:10 PM     Procedure Summary     Date: 04/11/22 Room / Location: [facility] 03 / [facility]    Anesthesia Start: 0868 Anesthesia Stop: 8664    Procedures:       EGD BIOPSY (N/A )      Procedure Not Yet Scheduled Diagnosis: (/)    Surgeons: Bipin Joya MD Responsible Provider: Titus Owens DO    Anesthesia Type: MAC ASA Status: 4          Anesthesia Type: MAC    Viraj Phase I: Viraj Score: 10    Viraj Phase II:      Last vitals: Reviewed and per EMR flowsheets. Anesthesia Post Evaluation    Patient location during evaluation: bedside  Patient participation: complete - patient participated  Level of consciousness: awake  Pain score: 0  Airway patency: patent  Nausea & Vomiting: no vomiting and no nausea  Complications: no  Cardiovascular status: hemodynamically stable  Respiratory status: acceptable  Hydration status: stable  Comments: Seen and examined. Progressing well. Questions answered.

## 2022-04-11 NOTE — PROGRESS NOTES
Progress Note  Date:2022       Room:0410/0410-  Patient Xander Proctor     YOB: 1980     Age:42 y.o. Patient seen for follow up of hematemesis. Patient states she did not sleep well last night. She admits that her latest alcohol relapse was secondary to many different events in her life. She states that her  and herself were doing well but then her  started drinking again. They filed were going to file for divorce but decided to just keep the paperwork. She had been doing well with her counseling but states that her work started to get on her about missing to go to appointments with her counselor so she quit her job. She was supposed to go to visit family in [place] for a weekend trip and missed the plane by 2-3 minutes. She states she knows she needs to quit drinking. She actually is scheduled for an appointment with her counselor today and set up for an ultrasound of her liver through [facility] scheduled for tomorrow. She is not having any shakiness or sweatiness. She is feeling well. She has not had any further nausea or vomiting. She has not had any more vaginal bleeding. She states her last menstrual period was 2 weeks ago and had changed to only 3 days and was light prior to this bleed. Subjective    Subjective:  Symptoms:  No shortness of breath, cough, chest pain, headache, chest pressure, diarrhea or anxiety. Diet:  NPO. No nausea or vomiting. Review of Systems   Respiratory: Negative for cough and shortness of breath. Cardiovascular: Negative for chest pain. Gastrointestinal: Negative for diarrhea, nausea and vomiting.      Objective         Vitals Last 24 Hours:  TEMPERATURE:  Temp  Av.3 °F (36.8 °C)  Min: 97.8 °F (36.6 °C)  Max: 98.8 °F (37.1 °C)  RESPIRATIONS RANGE: Resp  Av  Min: 18  Max: 18  PULSE OXIMETRY RANGE: SpO2  Av.3 %  Min: 98 %  Max: 100 %  PULSE RANGE: Pulse  Av.8  Min: 82  Max: 94  BLOOD PRESSURE RANGE: Systolic (24hrs), Av , Min:111 , RUC:824   ; Diastolic (01POC), OYD:82, Min:72, Max:89    I/O (24Hr): No intake or output data in the 24 hours ending 22  Objective:  General Appearance:  Comfortable, well-appearing and in no acute distress. Vital signs: (most recent): Blood pressure 124/89, pulse 82, temperature 97.8 °F (36.6 °C), temperature source Oral, resp. rate 18, height 5' 2\" (1.575 m), weight 137 lb (62.1 kg), SpO2 99 %, not currently breastfeeding. Lungs:  Normal effort and normal respiratory rate. Breath sounds clear to auscultation. No rales or rhonchi. Heart: Normal rate. Regular rhythm. S1 normal and S2 normal.  No friction rub. Abdomen: Abdomen is soft and non-distended. Bowel sounds are normal.   There is no abdominal tenderness. There is no rebound tenderness. There is no guarding. Neurological: Patient is alert. Skin:  Warm and dry. Labs/Imaging/Diagnostics    Labs:  CBC:Recent Labs     22  0254 04/10/22  1013 22  0250   WBC 3.5* 4.3* 3.1*   RBC 4.11 4.10 4.02   HGB 12.5 12.6 12.4   HCT 39.8 37.8 37.4   MCV 96.8 92.2 93.0   RDW 16.3* 15.6* 15.7*   PLT 69* 72* 69*     CHEMISTRIES:  Recent Labs     22  0254 04/10/22  1013 22  0250   * 132 138   K 5.0 3.6 3.9   CL 98 99 101   CO2 14* 22 26   BUN 5* 7 8   CREATININE 0.5 0.4* 0.5   GLUCOSE 211* 238* 149*     PT/INR:  Recent Labs     22  1302 22  0250   PROTIME 11.7 12.7*   INR 1.1 1.2     APTT:  Recent Labs     22  1302   APTT 31.9     LIVER PROFILE:  Recent Labs     22  0254 04/10/22  1013 22  0250   * 111* 295*   ALT 56* 60* 105*   BILITOT 2.4* 1.7* 1.6*   ALKPHOS 129* 131* 132*       Imaging Last 24 Hours:  US ABDOMEN LIMITED    Result Date: 2022  EXAMINATION: RIGHT UPPER QUADRANT ULTRASOUND 2022 2:37 pm COMPARISON: None.  HISTORY: ORDERING SYSTEM PROVIDED HISTORY: Assess liver TECHNOLOGIST PROVIDED HISTORY: Reason for exam:->Assess liver What reading provider will be dictating this exam?->CRC FINDINGS: Pancreas: Unremarkable. Liver: Heterogeneously echogenic liver. Mild hepatomegaly of 19 cm. Hepatopetal flow in the main portal vein. Biliary: Trace nonshadowing gallbladder sludge. Negative sonographic Whittaker's sign per technologist notes. Gallbladder wall thickness 3 mm. .  CBD diameter 3 mm. Right Kidney: No hydronephrosis of the right kidney. No free fluid is seen in the right upper quadrant. Hepatic steatosis. Mild hepatomegaly of 19 cm. Trace gallbladder sludge. No sonographic evidence of cholelithiasis. Normal caliber CBD. Assessment//Plan           Hospital Problems           Last Modified POA    * (Principal) Hematemesis 4/8/2022 Yes        Assessment & Plan  1. Alcoholic liver disease  2. Elevated lfts  3. Hematemesis  4. Thrombocytopenia  5. Irregular vaginal bleeding     For EGD Today with GI. Continue on octreotide and ppi. No signs of alcohol withdrawal. Will continue to monitor. Platelets and blood counts stable since admission. Will need transvaginal US as outpatient.  Sodium level back to normal.     Carlos Neves DO    Electronically signed by Carlos Neves DO on 4/11/22 at 6:22 AM EDT

## 2022-04-12 LAB
AFP-TUMOR MARKER: 6 NG/ML (ref 0–9)
CLOTEST: NORMAL

## 2022-07-12 DIAGNOSIS — U07.1 COVID: ICD-10-CM

## 2022-07-12 RX ORDER — SODIUM CHLORIDE 9 MG/ML
INJECTION, SOLUTION INTRAVENOUS CONTINUOUS
OUTPATIENT
Start: 2022-07-13

## 2022-07-12 RX ORDER — DIPHENHYDRAMINE HYDROCHLORIDE 50 MG/ML
50 INJECTION INTRAMUSCULAR; INTRAVENOUS
OUTPATIENT
Start: 2022-07-13

## 2022-07-12 RX ORDER — HEPARIN SODIUM (PORCINE) LOCK FLUSH IV SOLN 100 UNIT/ML 100 UNIT/ML
500 SOLUTION INTRAVENOUS PRN
OUTPATIENT
Start: 2022-07-13

## 2022-07-12 RX ORDER — ACETAMINOPHEN 325 MG/1
650 TABLET ORAL
OUTPATIENT
Start: 2022-07-13

## 2022-07-12 RX ORDER — BEBTELOVIMAB 87.5 MG/ML
175 INJECTION, SOLUTION INTRAVENOUS ONCE
Status: CANCELLED | OUTPATIENT
Start: 2022-07-13 | End: 2022-07-13

## 2022-07-12 RX ORDER — ONDANSETRON 2 MG/ML
8 INJECTION INTRAMUSCULAR; INTRAVENOUS
OUTPATIENT
Start: 2022-07-13

## 2022-07-12 RX ORDER — SODIUM CHLORIDE 9 MG/ML
5-250 INJECTION, SOLUTION INTRAVENOUS PRN
OUTPATIENT
Start: 2022-07-13

## 2022-07-12 RX ORDER — ALBUTEROL SULFATE 90 UG/1
4 AEROSOL, METERED RESPIRATORY (INHALATION) PRN
OUTPATIENT
Start: 2022-07-13

## 2022-07-12 RX ORDER — SODIUM CHLORIDE 0.9 % (FLUSH) 0.9 %
5-40 SYRINGE (ML) INJECTION PRN
Status: CANCELLED | OUTPATIENT
Start: 2022-07-13

## 2022-07-12 RX ORDER — EPINEPHRINE 1 MG/ML
0.3 INJECTION, SOLUTION, CONCENTRATE INTRAVENOUS PRN
OUTPATIENT
Start: 2022-07-13

## 2022-07-13 ENCOUNTER — HOSPITAL ENCOUNTER (OUTPATIENT)
Dept: INFUSION THERAPY | Age: 42
Setting detail: INFUSION SERIES
Discharge: HOME | End: 2022-07-13
Payer: COMMERCIAL

## 2022-07-13 VITALS
OXYGEN SATURATION: 95 % | RESPIRATION RATE: 18 BRPM | TEMPERATURE: 98.1 F | SYSTOLIC BLOOD PRESSURE: 143 MMHG | DIASTOLIC BLOOD PRESSURE: 96 MMHG | HEART RATE: 100 BPM

## 2022-07-13 DIAGNOSIS — U07.1 COVID: Primary | ICD-10-CM

## 2022-07-13 PROCEDURE — M0222 HC BEBTELOVIMAB INJECTION: HCPCS

## 2022-07-13 PROCEDURE — 6360000002 HC RX W HCPCS: Performed by: NURSE PRACTITIONER

## 2022-07-13 RX ORDER — ACETAMINOPHEN 325 MG/1
650 TABLET ORAL
OUTPATIENT
Start: 2022-07-13

## 2022-07-13 RX ORDER — SODIUM CHLORIDE 9 MG/ML
INJECTION, SOLUTION INTRAVENOUS CONTINUOUS
OUTPATIENT
Start: 2022-07-13

## 2022-07-13 RX ORDER — DIPHENHYDRAMINE HYDROCHLORIDE 50 MG/ML
50 INJECTION INTRAMUSCULAR; INTRAVENOUS
OUTPATIENT
Start: 2022-07-13

## 2022-07-13 RX ORDER — SODIUM CHLORIDE 9 MG/ML
5-250 INJECTION, SOLUTION INTRAVENOUS PRN
OUTPATIENT
Start: 2022-07-13

## 2022-07-13 RX ORDER — BEBTELOVIMAB 87.5 MG/ML
175 INJECTION, SOLUTION INTRAVENOUS ONCE
Status: COMPLETED | OUTPATIENT
Start: 2022-07-13 | End: 2022-07-13

## 2022-07-13 RX ORDER — SODIUM CHLORIDE 0.9 % (FLUSH) 0.9 %
5-40 SYRINGE (ML) INJECTION PRN
Status: DISCONTINUED | OUTPATIENT
Start: 2022-07-13 | End: 2022-07-14 | Stop reason: HOSPADM

## 2022-07-13 RX ORDER — HEPARIN SODIUM (PORCINE) LOCK FLUSH IV SOLN 100 UNIT/ML 100 UNIT/ML
500 SOLUTION INTRAVENOUS PRN
OUTPATIENT
Start: 2022-07-13

## 2022-07-13 RX ORDER — ONDANSETRON 2 MG/ML
8 INJECTION INTRAMUSCULAR; INTRAVENOUS
OUTPATIENT
Start: 2022-07-13

## 2022-07-13 RX ORDER — EPINEPHRINE 1 MG/ML
0.3 INJECTION, SOLUTION, CONCENTRATE INTRAVENOUS PRN
OUTPATIENT
Start: 2022-07-13

## 2022-07-13 RX ORDER — SODIUM CHLORIDE 0.9 % (FLUSH) 0.9 %
5-40 SYRINGE (ML) INJECTION PRN
Status: CANCELLED | OUTPATIENT
Start: 2022-07-13

## 2022-07-13 RX ORDER — ALBUTEROL SULFATE 90 UG/1
4 AEROSOL, METERED RESPIRATORY (INHALATION) PRN
OUTPATIENT
Start: 2022-07-13

## 2022-07-13 RX ORDER — BEBTELOVIMAB 87.5 MG/ML
175 INJECTION, SOLUTION INTRAVENOUS ONCE
Status: CANCELLED | OUTPATIENT
Start: 2022-07-13 | End: 2022-07-13

## 2022-07-13 RX ADMIN — BEBTELOVIMAB 175 MG: 87.5 INJECTION, SOLUTION INTRAVENOUS at 14:15

## 2022-07-13 NOTE — PROGRESS NOTES
Pt observed for 1 hour post infusion of Bebtelovimab VS are WNL. Pt given discharge instructions including signs & symptoms of allergic reaction and when to seek emergency medical care. Pt verbalized understanding. All questions answered.   Pt discharged without issue

## 2022-07-13 NOTE — PROGRESS NOTES
Pt given educational information on Bebtelovimab. All questions answered. Pt gives verbal consent to receive medications.

## 2022-07-16 ENCOUNTER — HOSPITAL ENCOUNTER (EMERGENCY)
Age: 42
Discharge: LEFT BEFORE DISCHARGE | End: 2022-07-16
Payer: COMMERCIAL

## 2022-07-16 VITALS
WEIGHT: 140 LBS | HEART RATE: 105 BPM | HEIGHT: 62 IN | BODY MASS INDEX: 25.76 KG/M2 | TEMPERATURE: 97.9 F | DIASTOLIC BLOOD PRESSURE: 100 MMHG | RESPIRATION RATE: 20 BRPM | SYSTOLIC BLOOD PRESSURE: 144 MMHG | OXYGEN SATURATION: 90 %

## 2022-07-16 PROCEDURE — 99283 EMERGENCY DEPT VISIT LOW MDM: CPT

## 2022-07-16 PROCEDURE — 9990000010 HC NO CHARGE VISIT

## 2022-07-16 RX ORDER — ONDANSETRON 4 MG/1
4 TABLET, FILM COATED ORAL EVERY 8 HOURS PRN
Status: ON HOLD | COMMUNITY
End: 2022-07-26 | Stop reason: HOSPADM

## 2022-07-17 NOTE — ED NOTES
Patient reports she is not waiting any longer. No IV was established.      Beto Sotelo RN  07/16/22 [id number]

## 2022-07-19 DIAGNOSIS — E86.0 DEHYDRATION: ICD-10-CM

## 2022-07-19 RX ORDER — SODIUM CHLORIDE 0.9 % (FLUSH) 0.9 %
5-40 SYRINGE (ML) INJECTION PRN
Start: 2022-07-19

## 2022-07-19 RX ORDER — SODIUM CHLORIDE 9 MG/ML
INJECTION, SOLUTION INTRAVENOUS CONTINUOUS
Start: 2022-07-19

## 2022-07-19 RX ORDER — HEPARIN SODIUM (PORCINE) LOCK FLUSH IV SOLN 100 UNIT/ML 100 UNIT/ML
500 SOLUTION INTRAVENOUS PRN
OUTPATIENT
Start: 2022-07-19

## 2022-07-24 ENCOUNTER — HOSPITAL ENCOUNTER (INPATIENT)
Age: 42
LOS: 2 days | Discharge: HOME | DRG: 177 | End: 2022-07-26
Attending: STUDENT IN AN ORGANIZED HEALTH CARE EDUCATION/TRAINING PROGRAM | Admitting: INTERNAL MEDICINE
Payer: COMMERCIAL

## 2022-07-24 ENCOUNTER — APPOINTMENT (OUTPATIENT)
Dept: CT IMAGING | Age: 42
DRG: 177 | End: 2022-07-24
Payer: COMMERCIAL

## 2022-07-24 DIAGNOSIS — U07.1 COVID: Primary | ICD-10-CM

## 2022-07-24 DIAGNOSIS — R06.02 SHORTNESS OF BREATH: ICD-10-CM

## 2022-07-24 DIAGNOSIS — F10.939 ALCOHOL WITHDRAWAL SYNDROME WITH COMPLICATION (HCC): ICD-10-CM

## 2022-07-24 DIAGNOSIS — E87.6 HYPOKALEMIA: ICD-10-CM

## 2022-07-24 PROBLEM — J96.00 ACUTE RESPIRATORY FAILURE DUE TO COVID-19 (HCC): Status: ACTIVE | Noted: 2022-07-24

## 2022-07-24 LAB
ACETAMINOPHEN LEVEL: <5 MCG/ML (ref 10–30)
ALBUMIN SERPL-MCNC: 3.7 G/DL (ref 3.5–5.2)
ALP BLD-CCNC: 373 U/L (ref 35–104)
ALT SERPL-CCNC: 84 U/L (ref 0–32)
ANION GAP SERPL CALCULATED.3IONS-SCNC: 23 MMOL/L (ref 7–16)
AST SERPL-CCNC: 359 U/L (ref 0–31)
BACTERIA: ABNORMAL /HPF
BASOPHILS ABSOLUTE: 0.06 E9/L (ref 0–0.2)
BASOPHILS RELATIVE PERCENT: 0.9 % (ref 0–2)
BILIRUB SERPL-MCNC: 4.4 MG/DL (ref 0–1.2)
BILIRUBIN URINE: ABNORMAL
BLOOD, URINE: NEGATIVE
BUN BLDV-MCNC: 10 MG/DL (ref 6–20)
C-REACTIVE PROTEIN: 0.3 MG/DL (ref 0–0.4)
CALCIUM SERPL-MCNC: 7.9 MG/DL (ref 8.6–10.2)
CHLORIDE BLD-SCNC: 95 MMOL/L (ref 98–107)
CLARITY: CLEAR
CO2: 25 MMOL/L (ref 22–29)
COLOR: ABNORMAL
CREAT SERPL-MCNC: 0.4 MG/DL (ref 0.5–1)
D DIMER: <200 NG/ML DDU
EKG ATRIAL RATE: 84 BPM
EKG P AXIS: 57 DEGREES
EKG P-R INTERVAL: 132 MS
EKG Q-T INTERVAL: 426 MS
EKG QRS DURATION: 72 MS
EKG QTC CALCULATION (BAZETT): 503 MS
EKG R AXIS: 41 DEGREES
EKG T AXIS: 58 DEGREES
EKG VENTRICULAR RATE: 84 BPM
EOSINOPHILS ABSOLUTE: 0.01 E9/L (ref 0.05–0.5)
EOSINOPHILS RELATIVE PERCENT: 0.1 % (ref 0–6)
EPITHELIAL CELLS, UA: ABNORMAL /HPF
ETHANOL: 144 MG/DL (ref 0–0.08)
FERRITIN: 617 NG/ML
GFR AFRICAN AMERICAN: >60
GFR NON-AFRICAN AMERICAN: >60 ML/MIN/1.73
GLUCOSE BLD-MCNC: 86 MG/DL (ref 74–99)
GLUCOSE URINE: 250 MG/DL
HCG, URINE, POC: NEGATIVE
HCT VFR BLD CALC: 39.6 % (ref 34–48)
HEMOGLOBIN: 13.6 G/DL (ref 11.5–15.5)
IMMATURE GRANULOCYTES #: 0.04 E9/L
IMMATURE GRANULOCYTES %: 0.6 % (ref 0–5)
KETONES, URINE: 15 MG/DL
LACTATE DEHYDROGENASE: 370 U/L (ref 135–214)
LACTIC ACID, SEPSIS: 4.8 MMOL/L (ref 0.5–1.9)
LACTIC ACID: 4.7 MMOL/L (ref 0.5–2.2)
LACTIC ACID: 5 MMOL/L (ref 0.5–2.2)
LEUKOCYTE ESTERASE, URINE: NEGATIVE
LIPASE: 34 U/L (ref 13–60)
LYMPHOCYTES ABSOLUTE: 1.03 E9/L (ref 1.5–4)
LYMPHOCYTES RELATIVE PERCENT: 15 % (ref 20–42)
Lab: NORMAL
MAGNESIUM: 1.2 MG/DL (ref 1.6–2.6)
MCH RBC QN AUTO: 32.8 PG (ref 26–35)
MCHC RBC AUTO-ENTMCNC: 34.3 % (ref 32–34.5)
MCV RBC AUTO: 95.4 FL (ref 80–99.9)
MONOCYTES ABSOLUTE: 0.66 E9/L (ref 0.1–0.95)
MONOCYTES RELATIVE PERCENT: 9.6 % (ref 2–12)
NEGATIVE QC PASS/FAIL: NORMAL
NEUTROPHILS ABSOLUTE: 5.07 E9/L (ref 1.8–7.3)
NEUTROPHILS RELATIVE PERCENT: 73.8 % (ref 43–80)
NITRITE, URINE: POSITIVE
PDW BLD-RTO: 19.2 FL (ref 11.5–15)
PH UA: 6 (ref 5–9)
PLATELET # BLD: 152 E9/L (ref 130–450)
PMV BLD AUTO: 10.3 FL (ref 7–12)
POSITIVE QC PASS/FAIL: NORMAL
POTASSIUM REFLEX MAGNESIUM: 3 MMOL/L (ref 3.5–5)
PROCALCITONIN: 0.12 NG/ML (ref 0–0.08)
PROTEIN UA: 30 MG/DL
RBC # BLD: 4.15 E12/L (ref 3.5–5.5)
RBC UA: ABNORMAL /HPF (ref 0–2)
SALICYLATE, SERUM: <0.3 MG/DL (ref 0–30)
SARS-COV-2, NAAT: NOT DETECTED
SODIUM BLD-SCNC: 143 MMOL/L (ref 132–146)
SPECIFIC GRAVITY UA: 1.02 (ref 1–1.03)
TOTAL PROTEIN: 6.7 G/DL (ref 6.4–8.3)
TRICYCLIC ANTIDEPRESSANTS SCREEN SERUM: NEGATIVE NG/ML
TROPONIN, HIGH SENSITIVITY: 6 NG/L (ref 0–9)
UROBILINOGEN, URINE: >=8 E.U./DL
WBC # BLD: 6.9 E9/L (ref 4.5–11.5)
WBC UA: ABNORMAL /HPF (ref 0–5)

## 2022-07-24 PROCEDURE — 96365 THER/PROPH/DIAG IV INF INIT: CPT

## 2022-07-24 PROCEDURE — 80053 COMPREHEN METABOLIC PANEL: CPT

## 2022-07-24 PROCEDURE — 2580000003 HC RX 258: Performed by: RADIOLOGY

## 2022-07-24 PROCEDURE — 6370000000 HC RX 637 (ALT 250 FOR IP): Performed by: STUDENT IN AN ORGANIZED HEALTH CARE EDUCATION/TRAINING PROGRAM

## 2022-07-24 PROCEDURE — 6360000004 HC RX CONTRAST MEDICATION: Performed by: RADIOLOGY

## 2022-07-24 PROCEDURE — 6360000002 HC RX W HCPCS: Performed by: STUDENT IN AN ORGANIZED HEALTH CARE EDUCATION/TRAINING PROGRAM

## 2022-07-24 PROCEDURE — 87635 SARS-COV-2 COVID-19 AMP PRB: CPT

## 2022-07-24 PROCEDURE — 83605 ASSAY OF LACTIC ACID: CPT

## 2022-07-24 PROCEDURE — 96366 THER/PROPH/DIAG IV INF ADDON: CPT

## 2022-07-24 PROCEDURE — 83690 ASSAY OF LIPASE: CPT

## 2022-07-24 PROCEDURE — 85378 FIBRIN DEGRADE SEMIQUANT: CPT

## 2022-07-24 PROCEDURE — 82728 ASSAY OF FERRITIN: CPT

## 2022-07-24 PROCEDURE — 86140 C-REACTIVE PROTEIN: CPT

## 2022-07-24 PROCEDURE — 84145 PROCALCITONIN (PCT): CPT

## 2022-07-24 PROCEDURE — 2700000000 HC OXYGEN THERAPY PER DAY

## 2022-07-24 PROCEDURE — 80307 DRUG TEST PRSMV CHEM ANLYZR: CPT

## 2022-07-24 PROCEDURE — 2580000003 HC RX 258: Performed by: STUDENT IN AN ORGANIZED HEALTH CARE EDUCATION/TRAINING PROGRAM

## 2022-07-24 PROCEDURE — 96361 HYDRATE IV INFUSION ADD-ON: CPT

## 2022-07-24 PROCEDURE — 87186 SC STD MICRODIL/AGAR DIL: CPT

## 2022-07-24 PROCEDURE — 2500000003 HC RX 250 WO HCPCS: Performed by: STUDENT IN AN ORGANIZED HEALTH CARE EDUCATION/TRAINING PROGRAM

## 2022-07-24 PROCEDURE — 83735 ASSAY OF MAGNESIUM: CPT

## 2022-07-24 PROCEDURE — 96375 TX/PRO/DX INJ NEW DRUG ADDON: CPT

## 2022-07-24 PROCEDURE — 80179 DRUG ASSAY SALICYLATE: CPT

## 2022-07-24 PROCEDURE — 83615 LACTATE (LD) (LDH) ENZYME: CPT

## 2022-07-24 PROCEDURE — 85025 COMPLETE CBC W/AUTO DIFF WBC: CPT

## 2022-07-24 PROCEDURE — 87040 BLOOD CULTURE FOR BACTERIA: CPT

## 2022-07-24 PROCEDURE — 81001 URINALYSIS AUTO W/SCOPE: CPT

## 2022-07-24 PROCEDURE — 71275 CT ANGIOGRAPHY CHEST: CPT

## 2022-07-24 PROCEDURE — 2060000000 HC ICU INTERMEDIATE R&B

## 2022-07-24 PROCEDURE — 80143 DRUG ASSAY ACETAMINOPHEN: CPT

## 2022-07-24 PROCEDURE — 87088 URINE BACTERIA CULTURE: CPT

## 2022-07-24 PROCEDURE — 99285 EMERGENCY DEPT VISIT HI MDM: CPT

## 2022-07-24 PROCEDURE — 93005 ELECTROCARDIOGRAM TRACING: CPT | Performed by: STUDENT IN AN ORGANIZED HEALTH CARE EDUCATION/TRAINING PROGRAM

## 2022-07-24 PROCEDURE — 82077 ASSAY SPEC XCP UR&BREATH IA: CPT

## 2022-07-24 PROCEDURE — 84484 ASSAY OF TROPONIN QUANT: CPT

## 2022-07-24 RX ORDER — LORAZEPAM 1 MG/1
1 TABLET ORAL ONCE
Status: COMPLETED | OUTPATIENT
Start: 2022-07-24 | End: 2022-07-24

## 2022-07-24 RX ORDER — SODIUM CHLORIDE 0.9 % (FLUSH) 0.9 %
5-40 SYRINGE (ML) INJECTION EVERY 12 HOURS SCHEDULED
Status: DISCONTINUED | OUTPATIENT
Start: 2022-07-24 | End: 2022-07-25 | Stop reason: SDUPTHER

## 2022-07-24 RX ORDER — LORAZEPAM 2 MG/ML
3 INJECTION INTRAMUSCULAR
Status: DISCONTINUED | OUTPATIENT
Start: 2022-07-24 | End: 2022-07-26 | Stop reason: HOSPADM

## 2022-07-24 RX ORDER — MAGNESIUM SULFATE IN WATER 40 MG/ML
2000 INJECTION, SOLUTION INTRAVENOUS ONCE
Status: COMPLETED | OUTPATIENT
Start: 2022-07-24 | End: 2022-07-24

## 2022-07-24 RX ORDER — NICOTINE 21 MG/24HR
1 PATCH, TRANSDERMAL 24 HOURS TRANSDERMAL DAILY
Status: DISCONTINUED | OUTPATIENT
Start: 2022-07-24 | End: 2022-07-26 | Stop reason: HOSPADM

## 2022-07-24 RX ORDER — LORAZEPAM 1 MG/1
3 TABLET ORAL
Status: DISCONTINUED | OUTPATIENT
Start: 2022-07-24 | End: 2022-07-26 | Stop reason: HOSPADM

## 2022-07-24 RX ORDER — SODIUM CHLORIDE 9 MG/ML
INJECTION, SOLUTION INTRAVENOUS PRN
Status: DISCONTINUED | OUTPATIENT
Start: 2022-07-24 | End: 2022-07-25 | Stop reason: SDUPTHER

## 2022-07-24 RX ORDER — DEXAMETHASONE SODIUM PHOSPHATE 10 MG/ML
6 INJECTION INTRAMUSCULAR; INTRAVENOUS ONCE
Status: COMPLETED | OUTPATIENT
Start: 2022-07-24 | End: 2022-07-24

## 2022-07-24 RX ORDER — LORAZEPAM 2 MG/ML
2 INJECTION INTRAMUSCULAR
Status: DISCONTINUED | OUTPATIENT
Start: 2022-07-24 | End: 2022-07-26 | Stop reason: HOSPADM

## 2022-07-24 RX ORDER — POTASSIUM CHLORIDE 20 MEQ/1
40 TABLET, EXTENDED RELEASE ORAL ONCE
Status: COMPLETED | OUTPATIENT
Start: 2022-07-24 | End: 2022-07-24

## 2022-07-24 RX ORDER — GRANULES FOR ORAL 3 G/1
3 POWDER ORAL ONCE
Status: DISCONTINUED | OUTPATIENT
Start: 2022-07-24 | End: 2022-07-25

## 2022-07-24 RX ORDER — LORAZEPAM 1 MG/1
1 TABLET ORAL
Status: DISCONTINUED | OUTPATIENT
Start: 2022-07-24 | End: 2022-07-26 | Stop reason: HOSPADM

## 2022-07-24 RX ORDER — LORAZEPAM 1 MG/1
4 TABLET ORAL
Status: DISCONTINUED | OUTPATIENT
Start: 2022-07-24 | End: 2022-07-26 | Stop reason: HOSPADM

## 2022-07-24 RX ORDER — LORAZEPAM 1 MG/1
2 TABLET ORAL
Status: DISCONTINUED | OUTPATIENT
Start: 2022-07-24 | End: 2022-07-26 | Stop reason: HOSPADM

## 2022-07-24 RX ORDER — 0.9 % SODIUM CHLORIDE 0.9 %
1000 INTRAVENOUS SOLUTION INTRAVENOUS ONCE
Status: COMPLETED | OUTPATIENT
Start: 2022-07-24 | End: 2022-07-24

## 2022-07-24 RX ORDER — SODIUM CHLORIDE 0.9 % (FLUSH) 0.9 %
5-40 SYRINGE (ML) INJECTION PRN
Status: DISCONTINUED | OUTPATIENT
Start: 2022-07-24 | End: 2022-07-25 | Stop reason: SDUPTHER

## 2022-07-24 RX ORDER — LORAZEPAM 2 MG/ML
4 INJECTION INTRAMUSCULAR
Status: DISCONTINUED | OUTPATIENT
Start: 2022-07-24 | End: 2022-07-26 | Stop reason: HOSPADM

## 2022-07-24 RX ORDER — LORAZEPAM 2 MG/ML
1 INJECTION INTRAMUSCULAR
Status: DISCONTINUED | OUTPATIENT
Start: 2022-07-24 | End: 2022-07-26 | Stop reason: HOSPADM

## 2022-07-24 RX ORDER — FOLIC ACID 5 MG/ML
1 INJECTION, SOLUTION INTRAMUSCULAR; INTRAVENOUS; SUBCUTANEOUS ONCE
Status: COMPLETED | OUTPATIENT
Start: 2022-07-24 | End: 2022-07-24

## 2022-07-24 RX ORDER — LANOLIN ALCOHOL/MO/W.PET/CERES
100 CREAM (GRAM) TOPICAL DAILY
Status: DISCONTINUED | OUTPATIENT
Start: 2022-07-24 | End: 2022-07-26 | Stop reason: HOSPADM

## 2022-07-24 RX ORDER — SODIUM CHLORIDE 0.9 % (FLUSH) 0.9 %
10 SYRINGE (ML) INJECTION PRN
Status: COMPLETED | OUTPATIENT
Start: 2022-07-24 | End: 2022-07-24

## 2022-07-24 RX ORDER — THIAMINE HYDROCHLORIDE 100 MG/ML
100 INJECTION, SOLUTION INTRAMUSCULAR; INTRAVENOUS ONCE
Status: COMPLETED | OUTPATIENT
Start: 2022-07-24 | End: 2022-07-24

## 2022-07-24 RX ADMIN — SODIUM CHLORIDE, PRESERVATIVE FREE 10 ML: 5 INJECTION INTRAVENOUS at 21:02

## 2022-07-24 RX ADMIN — Medication 100 MG: at 16:19

## 2022-07-24 RX ADMIN — THIAMINE HYDROCHLORIDE 100 MG: 100 INJECTION, SOLUTION INTRAMUSCULAR; INTRAVENOUS at 14:56

## 2022-07-24 RX ADMIN — SODIUM CHLORIDE 1000 ML: 9 INJECTION, SOLUTION INTRAVENOUS at 14:56

## 2022-07-24 RX ADMIN — MAGNESIUM SULFATE HEPTAHYDRATE 2000 MG: 40 INJECTION, SOLUTION INTRAVENOUS at 16:08

## 2022-07-24 RX ADMIN — POTASSIUM CHLORIDE 40 MEQ: 20 TABLET, EXTENDED RELEASE ORAL at 16:05

## 2022-07-24 RX ADMIN — DEXAMETHASONE SODIUM PHOSPHATE 6 MG: 10 INJECTION INTRAMUSCULAR; INTRAVENOUS at 16:05

## 2022-07-24 RX ADMIN — FOLIC ACID 1 MG: 5 INJECTION, SOLUTION INTRAMUSCULAR; INTRAVENOUS; SUBCUTANEOUS at 16:07

## 2022-07-24 RX ADMIN — LORAZEPAM 1 MG: 1 TABLET ORAL at 14:56

## 2022-07-24 RX ADMIN — LORAZEPAM 1 MG: 2 INJECTION INTRAMUSCULAR; INTRAVENOUS at 21:35

## 2022-07-24 RX ADMIN — SODIUM CHLORIDE, PRESERVATIVE FREE 10 ML: 5 INJECTION INTRAVENOUS at 16:45

## 2022-07-24 RX ADMIN — IOPAMIDOL 50 ML: 755 INJECTION, SOLUTION INTRAVENOUS at 16:47

## 2022-07-24 ASSESSMENT — ENCOUNTER SYMPTOMS
SHORTNESS OF BREATH: 1
DIARRHEA: 0
CHEST TIGHTNESS: 0
VOMITING: 0
COUGH: 0
ABDOMINAL DISTENTION: 0
PHOTOPHOBIA: 0
ABDOMINAL PAIN: 0
NAUSEA: 0

## 2022-07-24 ASSESSMENT — PAIN DESCRIPTION - PAIN TYPE: TYPE: ACUTE PAIN

## 2022-07-24 ASSESSMENT — PAIN SCALES - GENERAL: PAINLEVEL_OUTOF10: 2

## 2022-07-24 ASSESSMENT — PAIN DESCRIPTION - DESCRIPTORS: DESCRIPTORS: ACHING

## 2022-07-24 ASSESSMENT — PAIN - FUNCTIONAL ASSESSMENT: PAIN_FUNCTIONAL_ASSESSMENT: ACTIVITIES ARE NOT PREVENTED

## 2022-07-24 ASSESSMENT — PAIN DESCRIPTION - LOCATION: LOCATION: HEAD

## 2022-07-24 ASSESSMENT — PAIN DESCRIPTION - FREQUENCY: FREQUENCY: INTERMITTENT

## 2022-07-24 ASSESSMENT — PAIN DESCRIPTION - ORIENTATION: ORIENTATION: MID

## 2022-07-24 NOTE — ED NOTES
Spoke with [facility] on 6W. Received SBAR. No further questions at this time.       Yan Bo RN  07/24/22 1944

## 2022-07-24 NOTE — ED NOTES
Pt states that she has tested positive for covd on Monday, she states that she is unable to get over it, she has been having shortness of breath and states that she has been coughing up thick white sputum, her lungs are clear diminished very poor air exchange, her abdomen is soft with bowel sounds times 4, she states that she does have nausea off and on, she admits that she drinks a pint of tequilla everyday, last drink was last night.       Bouchra Hebert RN  07/24/22 2519

## 2022-07-24 NOTE — ED NOTES
Lab called with a critical lactic acid result of 5.0. Rn and  notified.      Mounika Held  07/24/22 1915

## 2022-07-24 NOTE — ED PROVIDER NOTES
Ruchi Ang is a 36year old female with [address] of alcohol abuse who presented to ED with shortness of breath. Patient has had increasing shortness of breath since diagnosed with COVID about 15 days ago. Patient has been monitoring her oxygen levels and today her oxygen decreased to 85%. Patient was confirmed to be 86% on room air at time of arrival to emergency department. Patient denies any history of VTE. Patient does have a history of alcohol abuse and drinks alcohol daily. Patient is starting to have symptoms of alcohol withdrawal.  Patient denies fever, chills, chest pain, nausea, vomiting, abdominal pain nothing make symptoms better or worse symptoms are moderate in severity and constant    The history is provided by the patient and medical records. Review of Systems   Constitutional:  Positive for fatigue. Negative for chills, diaphoresis and fever. Eyes:  Negative for photophobia and visual disturbance. Respiratory:  Positive for shortness of breath. Negative for cough and chest tightness. Cardiovascular:  Negative for chest pain, palpitations and leg swelling. Gastrointestinal:  Negative for abdominal distention, abdominal pain, diarrhea, nausea and vomiting. Genitourinary:  Negative for dysuria. Musculoskeletal:  Negative for neck pain and neck stiffness. Skin:  Negative for pallor and rash. Neurological:  Negative for headaches. Psychiatric/Behavioral:  Negative for confusion. Physical Exam  Vitals and nursing note reviewed. Constitutional:       General: She is not in acute distress. Appearance: Normal appearance. She is ill-appearing. HENT:      Head: Normocephalic and atraumatic. Eyes:      General: No scleral icterus. Conjunctiva/sclera: Conjunctivae normal.      Pupils: Pupils are equal, round, and reactive to light. Cardiovascular:      Rate and Rhythm: Regular rhythm. Tachycardia present.    Pulmonary:      Effort: Pulmonary effort is normal. Index  [SS] Zach Padilla MD        ED Course as of 07/24/22 2110   Sun Jul 24, 2022   1608 EKG: This EKG is signed and interpreted by me. 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 Past Surgical History:  has a past surgical history that includes Tonsillectomy and Upper gastrointestinal endoscopy (N/A, 4/11/2022). Social History:  reports that she has been smoking cigarettes. She has been smoking an average of 1 pack per day. She has never used smokeless tobacco. She reports current alcohol use. She reports that she does not use drugs. Family History: family history is not on file. The patients home medications have been reviewed.     Allergies: Penicillins and Erythromycin    -------------------------------------------------- RESULTS -------------------------------------------------    LABS:  Results for orders placed or performed during the hospital encounter of 07/24/22   COVID-19, Rapid    Specimen: Nasopharyngeal Swab   Result Value Ref Range    SARS-CoV-2, NAAT Not Detected Not Detected   CBC with Auto Differential   Result Value Ref Range    WBC 6.9 4.5 - 11.5 E9/L    RBC 4.15 3.50 - 5.50 E12/L    Hemoglobin 13.6 11.5 - 15.5 g/dL    Hematocrit 39.6 34.0 - 48.0 %    MCV 95.4 80.0 - 99.9 fL    MCH 32.8 26.0 - 35.0 pg    MCHC 34.3 32.0 - 34.5 %    RDW 19.2 (H) 11.5 - 15.0 fL    Platelets 970 081 - 845 E9/L    MPV 10.3 7.0 - 12.0 fL    Neutrophils % 73.8 43.0 - 80.0 %    Immature Granulocytes % 0.6 0.0 - 5.0 %    Lymphocytes % 15.0 (L) 20.0 - 42.0 %    Monocytes % 9.6 2.0 - 12.0 %    Eosinophils % 0.1 0.0 - 6.0 % Basophils % 0.9 0.0 - 2.0 %    Neutrophils Absolute 5.07 1.80 - 7.30 E9/L    Immature Granulocytes # 0.04 E9/L    Lymphocytes Absolute 1.03 (L) 1.50 - 4.00 E9/L    Monocytes Absolute 0.66 0.10 - 0.95 E9/L    Eosinophils Absolute 0.01 (L) 0.05 - 0.50 E9/L    Basophils Absolute 0.06 0.00 - 0.20 E9/L   Comprehensive Metabolic Panel w/ Reflex to MG   Result Value Ref Range    Sodium 143 132 - 146 mmol/L    Potassium reflex Magnesium 3.0 (L) 3.5 - 5.0 mmol/L    Chloride 95 (L) 98 - 107 mmol/L    CO2 25 22 - 29 mmol/L    Anion Gap 23 (H) 7 - 16 mmol/L    Glucose 86 74 - 99 mg/dL    BUN 10 6 - 20 mg/dL    Creatinine 0.4 (L) 0.5 - 1.0 mg/dL    GFR Non-African American >60 >=60 mL/min/1.73    GFR African American >60     Calcium 7.9 (L) 8.6 - 10.2 mg/dL    Total Protein 6.7 6.4 - 8.3 g/dL    Albumin 3.7 3.5 - 5.2 g/dL    Total Bilirubin 4.4 (H) 0.0 - 1.2 mg/dL    Alkaline Phosphatase 373 (H) 35 - 104 U/L    ALT 84 (H) 0 - 32 U/L     (H) 0 - 31 U/L   Troponin   Result Value Ref Range    Troponin, High Sensitivity 6 0 - 9 ng/L   Ferritin   Result Value Ref Range    Ferritin 617 ng/mL   C-Reactive Protein   Result Value Ref Range    CRP 0.3 0.0 - 0.4 mg/dL   Lactate Dehydrogenase   Result Value Ref Range     (H) 135 - 214 U/L   D-Dimer, Quantitative   Result Value Ref Range    D-Dimer, Quant <200 ng/mL DDU   Lactate, Sepsis   Result Value Ref Range    Lactic Acid, Sepsis 4.8 (HH) 0.5 - 1.9 mmol/L   Lipase   Result Value Ref Range    Lipase 34 13 - 60 U/L   Serum Drug Screen   Result Value Ref Range    Ethanol Lvl 144 mg/dL    Acetaminophen Level <5.0 (L) 10.0 - 46.4 mcg/mL    Salicylate, Serum <2.0 0.0 - 30.0 mg/dL    TCA Scrn NEGATIVE Cutoff:300 ng/mL   Urinalysis with Microscopic   Result Value Ref Range    Color, UA DARK YELLOW (A) Straw/Yellow    Clarity, UA Clear Clear    Glucose, Ur 250 (A) Negative mg/dL    Bilirubin Urine LARGE (A) Negative    Ketones, Urine 15 (A) Negative mg/dL    Specific Gravity, UA 1.025 1.005 - 1.030    Blood, Urine Negative Negative    pH, UA 6.0 5.0 - 9.0    Protein, UA 30 (A) Negative mg/dL    Urobilinogen, Urine >=8.0 (A) <2.0 E.U./dL    Nitrite, Urine POSITIVE (A) Negative    Leukocyte Esterase, Urine Negative Negative    WBC, UA NONE 0 - 5 /HPF    RBC, UA NONE 0 - 2 /HPF    Epithelial Cells, UA FEW /HPF    Bacteria, UA MANY (A) None Seen /HPF   Procalcitonin   Result Value Ref Range    Procalcitonin 0.12 (H) 0.00 - 0.08 ng/mL   Magnesium   Result Value Ref Range    Magnesium 1.2 (L) 1.6 - 2.6 mg/dL   Lactic Acid   Result Value Ref Range    Lactic Acid 4.7 (HH) 0.5 - 2.2 mmol/L   Lactic Acid   Result Value Ref Range    Lactic Acid 5.0 (HH) 0.5 - 2.2 mmol/L   POC Pregnancy Urine Qual   Result Value Ref Range    HCG, Urine, POC Negative Negative    Lot Number [id number]     Positive QC Pass/Fail Pass     Negative QC Pass/Fail Pass    EKG 12 Lead   Result Value Ref Range    Ventricular Rate 84 BPM    Atrial Rate 84 BPM    P-R Interval 132 ms    QRS Duration 72 ms    Q-T Interval 426 ms    QTc Calculation (Bazett) 503 ms    P Axis 57 degrees    R Axis 41 degrees    T Axis 58 degrees       RADIOLOGY:  CTA PULMONARY W CONTRAST   Final Result   No evidence of pulmonary embolism or acute pulmonary abnormality.                   ------------------------- NURSING NOTES AND VITALS REVIEWED ---------------------------  Date / Time Roomed:  7/24/2022  1:27 PM  ED Bed Assignment:  0603/0603-A    The nursing notes within the ED encounter and vital signs as below have been reviewed.      Patient Vitals for the past 24 hrs:   BP Temp Temp src Pulse Resp SpO2 Height Weight   07/24/22 2040 (!) 145/110 98.2 °F (36.8 °C) Oral (!) 102 20 98 % 5' 2\" (1.575 m) 121 lb 8 oz (55.1 kg)   07/24/22 1825 136/86 98.1 °F (36.7 °C) Oral (!) 104 -- -- -- --   07/24/22 1611 138/83 98.4 °F (36.9 °C) Oral 84 16 98 % -- --   07/24/22 1327 -- -- -- -- -- 98 % -- --   07/24/22 1325 (!) 138/98 98 °F (36.7 °C) -- 99 20 (!) 84 % 5' 2\" (1.575 m) 120 lb (54.4 kg)       Oxygen Saturation Interpretation: Abnormal    ------------------------------------------ PROGRESS NOTES ------------------------------------------  Re-evaluation(s):  Time: 4pm  Patients symptoms show no change  Repeat physical examination is not changed    Counseling:  I have spoken with the patient and discussed todays results, in addition to providing specific details for the plan of care and counseling regarding the diagnosis and prognosis. Their questions are answered at this time and they are agreeable with the plan of admission.    --------------------------------- ADDITIONAL PROVIDER NOTES ---------------------------------  Consultations:  Spoke with Dr. Maco Gonzalez for Dr. Chelsey Jain. Discussed case. They will admit the patient. This patient's ED course included: a personal history and physicial examination, re-evaluation prior to disposition, multiple bedside re-evaluations, IV medications, cardiac monitoring, continuous pulse oximetry, and complex medical decision making and emergency management    This patient has remained hemodynamically stable during their ED course. Please note that the withdrawal or failure to initiate urgent interventions for this patient would likely result in a life threatening deterioration or permanent disability. Accordingly this patient received 30 minutes of critical care time, excluding separately billable procedures. Diagnosis:  1. COVID    2. Shortness of breath    3. Alcohol withdrawal syndrome with complication ([facility] [address]    4. Hypokalemia        Disposition:  Patient's disposition: Admit to telemetry  Patient's condition is stable.          Mame Haines MD  07/24/22 3457

## 2022-07-25 PROBLEM — J96.01 ACUTE RESPIRATORY FAILURE WITH HYPOXIA (HCC): Status: ACTIVE | Noted: 2022-07-25

## 2022-07-25 LAB
ALBUMIN SERPL-MCNC: 3.3 G/DL (ref 3.5–5.2)
ALP BLD-CCNC: 328 U/L (ref 35–104)
ALT SERPL-CCNC: 70 U/L (ref 0–32)
AMMONIA: 53 UMOL/L (ref 11–51)
ANION GAP SERPL CALCULATED.3IONS-SCNC: 19 MMOL/L (ref 7–16)
AST SERPL-CCNC: 246 U/L (ref 0–31)
BASOPHILS ABSOLUTE: 0.03 E9/L (ref 0–0.2)
BASOPHILS RELATIVE PERCENT: 0.6 % (ref 0–2)
BILIRUB SERPL-MCNC: 6.3 MG/DL (ref 0–1.2)
BUN BLDV-MCNC: 7 MG/DL (ref 6–20)
CALCIUM SERPL-MCNC: 7.7 MG/DL (ref 8.6–10.2)
CHLORIDE BLD-SCNC: 93 MMOL/L (ref 98–107)
CO2: 23 MMOL/L (ref 22–29)
CREAT SERPL-MCNC: 0.3 MG/DL (ref 0.5–1)
EOSINOPHILS ABSOLUTE: 0 E9/L (ref 0.05–0.5)
EOSINOPHILS RELATIVE PERCENT: 0 % (ref 0–6)
GFR AFRICAN AMERICAN: >60
GFR NON-AFRICAN AMERICAN: >60 ML/MIN/1.73
GLUCOSE BLD-MCNC: 117 MG/DL (ref 74–99)
HCT VFR BLD CALC: 35.3 % (ref 34–48)
HEMOGLOBIN: 12.2 G/DL (ref 11.5–15.5)
IMMATURE GRANULOCYTES #: 0.02 E9/L
IMMATURE GRANULOCYTES %: 0.4 % (ref 0–5)
INR BLD: 1.3
LYMPHOCYTES ABSOLUTE: 0.86 E9/L (ref 1.5–4)
LYMPHOCYTES RELATIVE PERCENT: 17.8 % (ref 20–42)
MAGNESIUM: 1.6 MG/DL (ref 1.6–2.6)
MAGNESIUM: 1.7 MG/DL (ref 1.6–2.6)
MCH RBC QN AUTO: 33.1 PG (ref 26–35)
MCHC RBC AUTO-ENTMCNC: 34.6 % (ref 32–34.5)
MCV RBC AUTO: 95.7 FL (ref 80–99.9)
MONOCYTES ABSOLUTE: 0.8 E9/L (ref 0.1–0.95)
MONOCYTES RELATIVE PERCENT: 16.5 % (ref 2–12)
NEUTROPHILS ABSOLUTE: 3.13 E9/L (ref 1.8–7.3)
NEUTROPHILS RELATIVE PERCENT: 64.7 % (ref 43–80)
PDW BLD-RTO: 18.9 FL (ref 11.5–15)
PLATELET # BLD: 132 E9/L (ref 130–450)
PMV BLD AUTO: 11.1 FL (ref 7–12)
POTASSIUM REFLEX MAGNESIUM: 3.5 MMOL/L (ref 3.5–5)
PROTHROMBIN TIME: 14.9 SEC (ref 9.3–12.4)
RBC # BLD: 3.69 E12/L (ref 3.5–5.5)
SODIUM BLD-SCNC: 135 MMOL/L (ref 132–146)
TOTAL PROTEIN: 6 G/DL (ref 6.4–8.3)
WBC # BLD: 4.8 E9/L (ref 4.5–11.5)

## 2022-07-25 PROCEDURE — 6360000002 HC RX W HCPCS: Performed by: INTERNAL MEDICINE

## 2022-07-25 PROCEDURE — 83735 ASSAY OF MAGNESIUM: CPT

## 2022-07-25 PROCEDURE — 6370000000 HC RX 637 (ALT 250 FOR IP): Performed by: INTERNAL MEDICINE

## 2022-07-25 PROCEDURE — 82140 ASSAY OF AMMONIA: CPT

## 2022-07-25 PROCEDURE — 2580000003 HC RX 258: Performed by: INTERNAL MEDICINE

## 2022-07-25 PROCEDURE — 36415 COLL VENOUS BLD VENIPUNCTURE: CPT

## 2022-07-25 PROCEDURE — 2700000000 HC OXYGEN THERAPY PER DAY

## 2022-07-25 PROCEDURE — 80053 COMPREHEN METABOLIC PANEL: CPT

## 2022-07-25 PROCEDURE — 85610 PROTHROMBIN TIME: CPT

## 2022-07-25 PROCEDURE — 85025 COMPLETE CBC W/AUTO DIFF WBC: CPT

## 2022-07-25 PROCEDURE — 6370000000 HC RX 637 (ALT 250 FOR IP): Performed by: STUDENT IN AN ORGANIZED HEALTH CARE EDUCATION/TRAINING PROGRAM

## 2022-07-25 PROCEDURE — 2060000000 HC ICU INTERMEDIATE R&B

## 2022-07-25 RX ORDER — PANTOPRAZOLE SODIUM 40 MG/1
40 TABLET, DELAYED RELEASE ORAL
Status: DISCONTINUED | OUTPATIENT
Start: 2022-07-25 | End: 2022-07-26 | Stop reason: HOSPADM

## 2022-07-25 RX ORDER — ACETAMINOPHEN 325 MG/1
650 TABLET ORAL EVERY 6 HOURS PRN
Status: DISCONTINUED | OUTPATIENT
Start: 2022-07-25 | End: 2022-07-25

## 2022-07-25 RX ORDER — SENNA PLUS 8.6 MG/1
1 TABLET ORAL DAILY PRN
Status: DISCONTINUED | OUTPATIENT
Start: 2022-07-25 | End: 2022-07-26 | Stop reason: HOSPADM

## 2022-07-25 RX ORDER — IBUPROFEN 200 MG
200 TABLET ORAL EVERY 8 HOURS PRN
Status: DISCONTINUED | OUTPATIENT
Start: 2022-07-25 | End: 2022-07-26 | Stop reason: HOSPADM

## 2022-07-25 RX ORDER — ONDANSETRON 2 MG/ML
4 INJECTION INTRAMUSCULAR; INTRAVENOUS EVERY 6 HOURS PRN
Status: DISCONTINUED | OUTPATIENT
Start: 2022-07-25 | End: 2022-07-25

## 2022-07-25 RX ORDER — PANTOPRAZOLE SODIUM 40 MG/1
40 TABLET, DELAYED RELEASE ORAL
Status: DISCONTINUED | OUTPATIENT
Start: 2022-07-25 | End: 2022-07-25

## 2022-07-25 RX ORDER — DEXAMETHASONE SODIUM PHOSPHATE 10 MG/ML
6 INJECTION, SOLUTION INTRAMUSCULAR; INTRAVENOUS EVERY 24 HOURS
Status: DISCONTINUED | OUTPATIENT
Start: 2022-07-25 | End: 2022-07-25

## 2022-07-25 RX ORDER — ONDANSETRON 4 MG/1
4 TABLET, ORALLY DISINTEGRATING ORAL EVERY 8 HOURS PRN
Status: DISCONTINUED | OUTPATIENT
Start: 2022-07-25 | End: 2022-07-25

## 2022-07-25 RX ORDER — SODIUM CHLORIDE 0.9 % (FLUSH) 0.9 %
10 SYRINGE (ML) INJECTION EVERY 12 HOURS SCHEDULED
Status: DISCONTINUED | OUTPATIENT
Start: 2022-07-25 | End: 2022-07-26 | Stop reason: HOSPADM

## 2022-07-25 RX ORDER — ALBUTEROL SULFATE 90 UG/1
2 AEROSOL, METERED RESPIRATORY (INHALATION) EVERY 4 HOURS PRN
Status: DISCONTINUED | OUTPATIENT
Start: 2022-07-25 | End: 2022-07-25

## 2022-07-25 RX ORDER — SODIUM CHLORIDE 9 MG/ML
INJECTION, SOLUTION INTRAVENOUS CONTINUOUS
Status: DISCONTINUED | OUTPATIENT
Start: 2022-07-25 | End: 2022-07-26 | Stop reason: HOSPADM

## 2022-07-25 RX ORDER — MAGNESIUM SULFATE IN WATER 40 MG/ML
2000 INJECTION, SOLUTION INTRAVENOUS ONCE
Status: COMPLETED | OUTPATIENT
Start: 2022-07-25 | End: 2022-07-25

## 2022-07-25 RX ORDER — ACETAMINOPHEN 650 MG/1
650 SUPPOSITORY RECTAL EVERY 6 HOURS PRN
Status: DISCONTINUED | OUTPATIENT
Start: 2022-07-25 | End: 2022-07-25

## 2022-07-25 RX ORDER — SODIUM CHLORIDE 9 MG/ML
INJECTION, SOLUTION INTRAVENOUS PRN
Status: DISCONTINUED | OUTPATIENT
Start: 2022-07-25 | End: 2022-07-26 | Stop reason: HOSPADM

## 2022-07-25 RX ORDER — SODIUM CHLORIDE 0.9 % (FLUSH) 0.9 %
10 SYRINGE (ML) INJECTION PRN
Status: DISCONTINUED | OUTPATIENT
Start: 2022-07-25 | End: 2022-07-26 | Stop reason: HOSPADM

## 2022-07-25 RX ORDER — POTASSIUM CHLORIDE 20 MEQ/1
40 TABLET, EXTENDED RELEASE ORAL PRN
Status: DISCONTINUED | OUTPATIENT
Start: 2022-07-25 | End: 2022-07-26 | Stop reason: HOSPADM

## 2022-07-25 RX ORDER — ENOXAPARIN SODIUM 100 MG/ML
40 INJECTION SUBCUTANEOUS DAILY
Status: DISCONTINUED | OUTPATIENT
Start: 2022-07-25 | End: 2022-07-26 | Stop reason: HOSPADM

## 2022-07-25 RX ORDER — POTASSIUM CHLORIDE 7.45 MG/ML
10 INJECTION INTRAVENOUS PRN
Status: DISCONTINUED | OUTPATIENT
Start: 2022-07-25 | End: 2022-07-26 | Stop reason: HOSPADM

## 2022-07-25 RX ORDER — ALBUTEROL SULFATE 2.5 MG/3ML
2.5 SOLUTION RESPIRATORY (INHALATION) EVERY 4 HOURS PRN
Status: DISCONTINUED | OUTPATIENT
Start: 2022-07-25 | End: 2022-07-26 | Stop reason: HOSPADM

## 2022-07-25 RX ADMIN — WATER 1000 MG: 1 INJECTION INTRAMUSCULAR; INTRAVENOUS; SUBCUTANEOUS at 06:17

## 2022-07-25 RX ADMIN — Medication 100 MG: at 08:21

## 2022-07-25 RX ADMIN — ENOXAPARIN SODIUM 40 MG: 100 INJECTION SUBCUTANEOUS at 08:21

## 2022-07-25 RX ADMIN — SODIUM CHLORIDE: 9 INJECTION, SOLUTION INTRAVENOUS at 06:05

## 2022-07-25 RX ADMIN — PANTOPRAZOLE SODIUM 40 MG: 40 TABLET, DELAYED RELEASE ORAL at 06:16

## 2022-07-25 RX ADMIN — LORAZEPAM 1 MG: 1 TABLET ORAL at 08:21

## 2022-07-25 RX ADMIN — LORAZEPAM 2 MG: 1 TABLET ORAL at 21:17

## 2022-07-25 RX ADMIN — POTASSIUM CHLORIDE 40 MEQ: 20 TABLET, EXTENDED RELEASE ORAL at 12:40

## 2022-07-25 RX ADMIN — MAGNESIUM SULFATE HEPTAHYDRATE 2000 MG: 40 INJECTION, SOLUTION INTRAVENOUS at 12:47

## 2022-07-25 RX ADMIN — PANTOPRAZOLE SODIUM 40 MG: 40 TABLET, DELAYED RELEASE ORAL at 15:42

## 2022-07-25 RX ADMIN — SODIUM CHLORIDE: 9 INJECTION, SOLUTION INTRAVENOUS at 15:44

## 2022-07-25 ASSESSMENT — ENCOUNTER SYMPTOMS
CHOKING: 0
VOMITING: 1
PHOTOPHOBIA: 0
ABDOMINAL DISTENTION: 0
CONSTIPATION: 0
WHEEZING: 0
ABDOMINAL PAIN: 0
NAUSEA: 0
DIARRHEA: 0
SHORTNESS OF BREATH: 1
COUGH: 1
CHEST TIGHTNESS: 0

## 2022-07-25 ASSESSMENT — PAIN SCALES - GENERAL: PAINLEVEL_OUTOF10: 0

## 2022-07-25 NOTE — H&P
History & Physicial  07/25/22  Primary Care:  Rimma Hardy, DO  [address] [facility]  [address] / [place] [id number]        Chief Complaint   Patient presents with    Positive For Covid-19    Shortness of Breath     Patient tested positive on the 9th. Was 84% on RA in triage. HPI:  Patient is a 36year old female who presented to ER due to worsening shortness of breath and fatigue. Patient states that she and her whole family developed covid-19 on 7/9/2022. Due to her high risk status and liver disease she was not given paxlovid but referred to infusion center on 7/13/2022 she had  Bebtelovimab infusion. Patient states she continued to have cough and developed thick yellow secretions that were difficulty to cough up and made her vomit. She was feeling dehydrated. She did relapse on her alcohol but will not state when her last drink was to me. She states that she is working with her gastroenterologist at [facility] and is going to be seeing a psychiatrist and a  up there. She was found to be hypoxic in er to 84% on room air. She had CTA which did not reveal a PE. She was admitted for further work up. This am she is laying in bed. She states that her breathing is better but she still has thick secretions when she coughs. She denies any fevers or chills. She does admit to pain in right buttock with radiation down her leg. Prior to Visit Medications    Medication Sig Taking?  Authorizing Provider   ondansetron (ZOFRAN) 4 MG tablet Take 4 mg by mouth every 8 hours as needed for Nausea or Vomiting  Historical Provider, MD   nicotine (NICODERM CQ) 14 MG/24HR Place 1 patch onto the skin daily  Verónica Monsivais, DO   pantoprazole (PROTONIX) 40 MG tablet Take 1 tablet by mouth 2 times daily (before meals)  Verónica Monsivais, DO   sertraline (ZOLOFT) 50 MG tablet Take 50 mg by mouth daily Indications: patient only took one dose of this didnt like how she felt and stopped taking it  Patient not taking: Reported on 7/24/2022  Historical Provider, MD     Social History     Tobacco Use    Smoking status: Every Day     Packs/day: 1.00     Types: Cigarettes    Smokeless tobacco: Never   Vaping Use    Vaping Use: Never used   Substance Use Topics    Alcohol use: Yes     Comment: occ    Drug use: No     No family history on file. Past Surgical History:   Procedure Laterality Date    TONSILLECTOMY      UPPER GASTROINTESTINAL ENDOSCOPY N/A 4/11/2022    EGD BIOPSY performed by Martir Hanson MD at [facility] ENDOSCOPY     Past Medical History:   Diagnosis Date    COVID-19     Cooper-Danlos disease      Review of Systems   Constitutional:  Positive for activity change and fatigue. Negative for chills and fever. HENT:  Positive for congestion. Negative for ear discharge and ear pain. Eyes:  Negative for photophobia and visual disturbance. Respiratory:  Positive for cough and shortness of breath. Negative for choking, chest tightness and wheezing. Cardiovascular:  Negative for chest pain, palpitations and leg swelling. Gastrointestinal:  Positive for vomiting. Negative for abdominal distention, abdominal pain, constipation, diarrhea and nausea. Endocrine: Negative for polydipsia, polyphagia and polyuria. Genitourinary:  Negative for dysuria, frequency and hematuria. Musculoskeletal:  Positive for arthralgias and myalgias. Skin:  Negative for pallor and rash. Allergic/Immunologic: Negative for environmental allergies and food allergies. Neurological:  Negative for dizziness, seizures, light-headedness and headaches. Hematological:  Negative for adenopathy. Does not bruise/bleed easily. Psychiatric/Behavioral:  Negative for agitation and confusion.     Vitals:    07/24/22 1611 07/24/22 1825 07/24/22 2040 07/25/22 0415   BP: 138/83 136/86 (!) 145/110    Pulse: 84 (!) 104 (!) 102    Resp: 16  20    Temp: 98.4 °F (36.9 °C) 98.1 °F (36.7 °C) 98.2 °F (36.8 °C)    TempSrc: Oral Oral Oral    SpO2: 98%  98% Weight:   121 lb 8 oz (55.1 kg) 127 lb 4.8 oz (57.7 kg)   Height:   5' 2\" (1.575 m)        Physical Exam  Constitutional:       General: She is not in acute distress. HENT:      Head: Normocephalic and atraumatic. Right Ear: External ear normal.      Left Ear: External ear normal.      Nose: Nose normal. No congestion. Mouth/Throat:      Mouth: Mucous membranes are dry. Pharynx: Oropharynx is clear. No oropharyngeal exudate. Eyes:      General: Scleral icterus present. Right eye: No discharge. Left eye: No discharge. Extraocular Movements: Extraocular movements intact. Pupils: Pupils are equal, round, and reactive to light. Cardiovascular:      Rate and Rhythm: Normal rate and regular rhythm. Heart sounds:     No gallop. Pulmonary:      Effort: Pulmonary effort is normal. No respiratory distress. Breath sounds: No wheezing, rhonchi or rales. Abdominal:      General: Bowel sounds are normal. There is no distension. Palpations: Abdomen is soft. Tenderness: There is no guarding or rebound. Musculoskeletal:         General: Normal range of motion. Cervical back: Normal range of motion and neck supple. Right lower leg: No edema. Left lower leg: No edema. Skin:     General: Skin is warm and dry. Capillary Refill: Capillary refill takes less than 2 seconds. Neurological:      General: No focal deficit present. Mental Status: She is alert and oriented to person, place, and time. Psychiatric:         Mood and Affect: Mood normal.         Behavior: Behavior normal.       Principal Problem:    Acute respiratory failure due to COVID-19 [facility])  Active Problems:    Acute respiratory failure with hypoxia (HCC)  Resolved Problems:    * No resolved hospital problems.  *  Acute hypoxic respiratory failure  COVID-19 on 7/9/2022   Bronchitis   UTI   Alcoholic hepatitis   High anion gap metabolic Acidosis   Lactic acidosis  Hypomagnesemia  Hypokalemia    Plan:  Consult pulmonary and GI. Await am labs. Start on ceftriaxone. Patient has tolerated previously. Check urine culture. Continue on IVF. Continue on ciwa. Add INR to evaluate MELD Score.      DVT Prophylaxis: Lovenox  Code Status: Full     Laquita Rajput DO      Electronically signed by Laquita Rajput DO on 7/25/2022 at 5:47 AM

## 2022-07-25 NOTE — CONSULTS
Pulmonary Consultation    Admit Date: 7/24/2022  Requesting Physician: Mihir Davila DO    CC:  Acute respiratory failure s/p COVID +    HPI:  Nannette Dueñas is a 36 y.o. 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 She tested because her son tested positive 7/6. She complained of significant fatigue and no appetite. 7/23 She started with sob, cough with white thick sputum that would make her nauseous and she would vomit (no hematemesis). Chest heaviness when lying on her back. She did receive Bebtelovimab infusion on 07/13 due to her liver disease and being high risk, but did not feel it made a difference. She woke up yesterday and felt that she was drowning and decided to come to the ED. Her pulse ox was noted to be 85%. She was placed on O2. Her lactic acid was noted to be 4.8 in the ED with repeat 4.7 then 5.0. Procal was 0.12. CRP was 0.3, , Ferritin 617, D dimer <200, elevated LFT's. COVID was negative, antibody test was negative. +UTI. CTA with no PE or acute pathology. She is resting in bed on 1L nc with pox 99%. Feeling better today. Previous admissions:   10/20-10/23/21 - Jaundice and RUQ pain - GI consulted  04/08-04/11-22 - Jaundice. EGD 4/8 Grade B LA GERD, gastritis - small ulcer in bulb      PMH:    Past Medical History:   Diagnosis Date    COVID-19     Cooper-Danlos disease      PSH:    Past Surgical History:   Procedure Laterality Date    TONSILLECTOMY      UPPER GASTROINTESTINAL ENDOSCOPY N/A 4/11/2022    EGD BIOPSY performed by Richard Bonilla MD at [facility] ENDOSCOPY          Respiratory ROS: sob, chest tightness, poor appetite, fatigue, weakness, cough. Otherwise, a complete review of systems is undertaken and is negative.     Social History:  Social History     Socioeconomic History    Marital status:      Spouse name: Not on file    Number of children: Not on file    Years of education: Not on file    Highest education level: Not on file   Occupational History    Not on file   Tobacco Use    Smoking status: Every Day     Packs/day: 1.00     Types: Cigarettes    Smokeless tobacco: Never   Vaping Use    Vaping Use: Never used   Substance and Sexual Activity    Alcohol use: Yes     Comment: occ    Drug use: No    Sexual activity: Not on file   Other Topics Concern    Not on file   Social History Narrative    Not on file     Social Determinants of Health     Financial Resource Strain: Not on file   Food Insecurity: Not on file   Transportation Needs: Not on file   Physical Activity: Not on file   Stress: Not on file   Social Connections: Not on file   Intimate Partner Violence: Not on file   Housing Stability: Not on file       Family History:  Father - Alive 59 DM1  Mother - Alive 58 - Cooper-Danlos disease  Sister - Alive 27 - OCD, psych  Son 25 A&W  Son 25 Alive DM1    Medications:     sodium chloride 100 mL/hr at 22 3309    sodium chloride        sodium chloride flush  10 mL IntraVENous 2 times per day    enoxaparin  40 mg SubCUTAneous Daily    cefTRIAXone (ROCEPHIN) IV  1,000 mg IntraVENous Q24H    dexamethasone  6 mg IntraVENous Q24H    pantoprazole  40 mg Oral BID AC    thiamine  100 mg Oral Daily    nicotine  1 patch TransDERmal Daily         Vitals:    VITALS:  BP (!) 130/97   Pulse (!) 104   Temp 98.2 °F (36.8 °C) (Oral)   Resp 20   Ht 5' 2\" (1.575 m)   Wt 127 lb 4.8 oz (57.7 kg)   SpO2 99%   BMI 23.28 kg/m²   24HR INTAKE/OUTPUT:  No intake or output data in the 24 hours ending 22 0915  CURRENT PULSE OXIMETRY:  SpO2: 99 %  24HR PULSE OXIMETRY RANGE:  SpO2  Av.4 %  Min: 84 %  Max: 99 %      EXAM:  General: No distress. Eyes: PERRL. +Sclera icterus. No conjunctival injection. ENT: No discharge. Pharynx clear. Leti Carrasco noted. Neck: Trachea midline. Normal thyroid. Resp: No accessory muscle use. No crackles. No wheezing. No rhonchi. Diminished. CV: Regular rate. Regular rhythm. No mumur or rub. ABD: Non-tender. Non-distended. No masses. No organmegaly. Normal bowel sounds. Skin: Warm and dry. No nodule on exposed extremities. No rash on exposed extremities. Lymph: No cervical LAD. No supraclavicular LAD. Ext: No joint deformity. No clubbing. No cyanosis. No edema  Neuro: Awake. Follows commands      Lab Results:  CBC:   Recent Labs     07/24/22  1439 07/25/22  0610   WBC 6.9 4.8   HGB 13.6 12.2   HCT 39.6 35.3   MCV 95.4 95.7    132     BMP:   Recent Labs     07/24/22  1439 07/25/22  0610    135   K 3.0* 3.5   CL 95* 93*   CO2 25 23   BUN 10 7   CREATININE 0.4* 0.3*     LFT:   Recent Labs     07/24/22  1439 07/25/22  0610   ALKPHOS 373* 328*   ALT 84* 70*   * 246*   PROT 6.7 6.0*   BILITOT 4.4* 6.3*   LABALBU 3.7 3.3*     PT/INR: No results for input(s): PROTIME, INR in the last 72 hours. Cultures:  No results for input(s): CULTRESP in the last 72 hours. ABG:   No results for input(s): PH, PO2, PCO2, HCO3, BE, O2SAT in the last 72 hours. Films:  CTA PULMONARY 7/24/2022: Pulmonary Arteries: Pulmonary arteries are adequately opacified for evaluation. No evidence of intraluminal filling defect to suggest pulmonary embolism. Main pulmonary artery is normal in caliber. Mediastinum: No evidence of mediastinal lymphadenopathy. The heart and pericardium demonstrate no acute abnormality. There is no acute abnormality of the thoracic aorta. Lungs/pleura: The lungs are without acute process. No focal consolidation or pulmonary edema. No evidence of pleural effusion or pneumothorax. Upper Abdomen:  Limited images of the upper abdomen demonstrate no acute abnormality. There is hepatic steatosis. There are perfusional abnormalities with areas of capsular retraction, new since the prior examination.  Soft Tissues/Bones: No acute bone or soft tissue abnormality. No evidence of pulmonary embolism or acute pulmonary abnormality. Assessment:  Hypoxemia  COVID + 7/9 - currently covid negative  UTI  Liver failure with elevated LFT's  Cooper- Danlos disease  GERD  Current tobacco abuse      Plan:  Weaned off O2 - room air is baseline. 7/24 CTA with no acute issues or PE. IVF for lactic acidosis - 7/24 lactic acid 5.0  On rocephin for UTI - procal 0.12  Probable does not need dexamethasone - will d/c, inflammatory markers not impressive - CRP 0.3, , Ferritin 617, D dimer <200. Not a candidate for Holli Archibald. S/P  Bebtelovimab   GI consulted - liver enzymes elevated. She has follow up at [facility] tomorrow with GI, psych and social work  CIWA prophylaxis   Nicoderm patch for current smoking  Add albuterol prn  Add IS  DVT lovenox  Outpt PFT's    Long discussion regarding ETOH and smoking - she has a room at her father's house to start detox after discharge. Her  will not stop drinking and that is why she continues to drink, she states. Encouraged smoking cessation. Thank you for allowing us to see this patient in consultation. Please contact us with any questions. Electronically signed by JAYA Adkins - CNP on 7/25/2022 at 9:15 AM      Seen and examined, agree with above. Meds, labs and imaging reviewed. Hypoxemia is resolved and breathing seems fine. Question if she had bronchospasm related to COVID that resolved. Will check ambulatory pulse oximetry and then should be good for dc from pulm,. Suspect lactic acidosis is due to liver disease.

## 2022-07-25 NOTE — PROGRESS NOTES
Message left with Dr. Maverick Bowieman answering service in regards to admission orders. No response and no new orders at this time. Will continue to monitor.

## 2022-07-25 NOTE — CONSULTS
Gastroenterology Consult Note   JAYA Wilson NP-C with Chin Leonardo M.D. Consult Note        Date of Service: 7/25/2022  Reason for Consult: Alcoholic hepatitis  Requesting Physician: Edgardo Clement    CHIEF COMPLAINT: Shortness of breath    History Obtained From: Patient, EMR    HISTORY OF PRESENT ILLNESS:       Nannette Dueñas is a 36 y.o. female with significant past medical history of alcohol abuse and Cooper-Danlos disease  admitted via ED for shortness of breath. Pt reports she was diagnosed by home test with COVID on 7/9/2022 for the last few weeks had intermittent bouts of confusion lethargy, fatigue and loss of appetite. Came to ER for evaluation due to \"felt like I had foam in my lungs\". And nausea with multiple bouts of clear emesis, denies hematemesis. No reports of abdominal/epigastric pain. Bowel movements\" normal\" denies melena or hematochezia. Prior to doing okay reports continued use of alcohol. She reports she was living with her mother in [place] for alcohol abstinence recently moved back home with her  leading to recurrent alcohol use. Emotional, states she has appointment with [facility] gastroenterology, social work and psychiatry tomorrow. Reports she follows with  was told she had F3 F4 fibrosis. No recent changes in medications, denies anticoagulants, NSAIDs or Tylenol use. Admission labs alk phos 373, ALT 84, , bilirubin 4.4, lactic acid 4.8, potassium 3, chloride 95, creatinine 0.4, anion gap 23. Consultation for colic hepatitis. Pt is known to Dr. Ra Lucas, last seen on last admission and was lost to follow-up. Reports she currently follows with [facility] as outpatient. EGD 1/28/21 with Dr. Ra Lucas: GERD, gastritis, no esophageal varices. Currently, pt reports was given multiple doses of Ativan for tremors however states she feels better today wants to go home. No complaints of abdominal pain, epigastric pain or dyspnea.   Labs today alk phos 328, ALT 70, , ammonia 53, chloride 93, creatinine 0.3, glucose 117, calcium 7.7.     Past Medical History:        Diagnosis Date    COVID-19     Cooper-Danlos disease      Past Surgical History:        Procedure Laterality Date    TONSILLECTOMY      UPPER GASTROINTESTINAL ENDOSCOPY N/A 4/11/2022    EGD BIOPSY performed by Alexis Iverson MD at [facility] ENDOSCOPY     Current Medications:    Current Facility-Administered Medications: 0.9 % sodium chloride infusion, , IntraVENous, Continuous  sodium chloride flush 0.9 % injection 10 mL, 10 mL, IntraVENous, 2 times per day  sodium chloride flush 0.9 % injection 10 mL, 10 mL, IntraVENous, PRN  0.9 % sodium chloride infusion, , IntraVENous, PRN  potassium chloride (KLOR-CON M) extended release tablet 40 mEq, 40 mEq, Oral, PRN **OR** potassium bicarb-citric acid (EFFER-K) effervescent tablet 40 mEq, 40 mEq, Oral, PRN **OR** potassium chloride 10 mEq/100 mL IVPB (Peripheral Line), 10 mEq, IntraVENous, PRN  enoxaparin (LOVENOX) injection 40 mg, 40 mg, SubCUTAneous, Daily  senna (SENOKOT) tablet 8.6 mg, 1 tablet, Oral, Daily PRN  cefTRIAXone (ROCEPHIN) 1,000 mg in sterile water 10 mL IV syringe, 1,000 mg, IntraVENous, Q24H  ibuprofen (ADVIL;MOTRIN) tablet 200 mg, 200 mg, Oral, Q8H PRN  pantoprazole (PROTONIX) tablet 40 mg, 40 mg, Oral, BID AC  trimethobenzamide (TIGAN) injection 200 mg, 200 mg, IntraMUSCular, Q6H PRN  albuterol (PROVENTIL) nebulizer solution 2.5 mg, 2.5 mg, Nebulization, Q4H PRN  thiamine tablet 100 mg, 100 mg, Oral, Daily  nicotine (NICODERM CQ) 21 MG/24HR 1 patch, 1 patch, TransDERmal, Daily  LORazepam (ATIVAN) tablet 1 mg, 1 mg, Oral, Q1H PRN **OR** LORazepam (ATIVAN) injection 1 mg, 1 mg, IntraVENous, Q1H PRN **OR** LORazepam (ATIVAN) tablet 2 mg, 2 mg, Oral, Q1H PRN **OR** LORazepam (ATIVAN) injection 2 mg, 2 mg, IntraVENous, Q1H PRN **OR** LORazepam (ATIVAN) tablet 3 mg, 3 mg, Oral, Q1H PRN **OR** LORazepam (ATIVAN) injection 3 mg, 3 mg, IntraVENous, Q1H PRN **OR** LORazepam (ATIVAN) tablet 4 mg, 4 mg, Oral, Q1H PRN **OR** LORazepam (ATIVAN) injection 4 mg, 4 mg, IntraVENous, Q1H PRN    Allergies:  Penicillins and Erythromycin    Social History:    Tobacco:  Pt reports she smokes cigarettes of < 1 ppd for 24 yrs. Alcohol:  Pt reports daily consumption of a pint of Tequila  Illicit Drugs: Pt denies. Family History: Mother living with diverticulosis, diverticulitis, and bowel resections due to diverticulitis. She has Cooper-Danlos disease. Father living, DM 1.  1 sister living with thyroid disease. 1 brother living with seizures. 1 child with diabetes mellitus type 1.  1 child living and healthy. REVIEW OF SYSTEMS:    Aside from what was mentioned in the PMH and HPI, essentially unremarkable, all others negative. PHYSICAL EXAM:      Vitals:    BP (!) 122/92   Pulse (!) 110   Temp 97.5 °F (36.4 °C)   Resp 18   Ht 5' 2\" (1.575 m)   Wt 127 lb 4.8 oz (57.7 kg)   SpO2 100%   BMI 23.28 kg/m²       CONSTITUTIONAL:  awake, alert, cooperative, no apparent distress, and appears stated age  EYES:  pupils equal, round and reactive to light, sclera icteric and conjunctiva normal  ENT:  normocephalic, oral pharynx with moist mucous membranes  NECK:  supple   HEMATOLOGIC/LYMPHATICS:  no cervical lymphadenopathy and no supraclavicular lymphadenopathy  LUNGS:  No increased work of breathing, good air exchange, clear to auscultation bilaterally.   CARDIOVASCULAR:  Normal apical impulse, regular rate and rhythm, no murmur noted; 2+ pulses; no edema  ABDOMEN:  normal bowel sounds, soft, non-distended, non-tender, no masses palpated, no hepatosplenomegally  MUSCULOSKELETAL:  full range of motion noted  motor strength is 5 out of 5 all extremities bilaterally  NEUROLOGIC:  Mental Status Exam:  Level of Alertness:   awake  Orientation:   person, place, time  Motor Exam:  Motor exam is symmetrical 5 out of 5 all extremities bilaterally  SKIN: Jaundiced skin color, texture, turgor    DATA:    CBC with Differential:    Lab Results   Component Value Date/Time    WBC 4.8 07/25/2022 06:10 AM    RBC 3.69 07/25/2022 06:10 AM    HGB 12.2 07/25/2022 06:10 AM    HCT 35.3 07/25/2022 06:10 AM     07/25/2022 06:10 AM    MCV 95.7 07/25/2022 06:10 AM    MCH 33.1 07/25/2022 06:10 AM    MCHC 34.6 07/25/2022 06:10 AM    RDW 18.9 07/25/2022 06:10 AM    LYMPHOPCT 17.8 07/25/2022 06:10 AM    MONOPCT 16.5 07/25/2022 06:10 AM    BASOPCT 0.6 07/25/2022 06:10 AM    MONOSABS 0.80 07/25/2022 06:10 AM    LYMPHSABS 0.86 07/25/2022 06:10 AM    EOSABS 0.00 07/25/2022 06:10 AM    BASOSABS 0.03 07/25/2022 06:10 AM     CMP:    Lab Results   Component Value Date/Time     07/25/2022 06:10 AM    K 3.5 07/25/2022 06:10 AM    CL 93 07/25/2022 06:10 AM    CO2 23 07/25/2022 06:10 AM    BUN 7 07/25/2022 06:10 AM    CREATININE 0.3 07/25/2022 06:10 AM    GFRAA >60 07/25/2022 06:10 AM    LABGLOM >60 07/25/2022 06:10 AM    GLUCOSE 117 07/25/2022 06:10 AM    PROT 6.0 07/25/2022 06:10 AM    LABALBU 3.3 07/25/2022 06:10 AM    CALCIUM 7.7 07/25/2022 06:10 AM    BILITOT 6.3 07/25/2022 06:10 AM    ALKPHOS 328 07/25/2022 06:10 AM     07/25/2022 06:10 AM    ALT 70 07/25/2022 06:10 AM     Hepatic Function Panel:    Lab Results   Component Value Date/Time    ALKPHOS 328 07/25/2022 06:10 AM    ALT 70 07/25/2022 06:10 AM     07/25/2022 06:10 AM    PROT 6.0 07/25/2022 06:10 AM    BILITOT 6.3 07/25/2022 06:10 AM    BILIDIR 8.6 10/16/2021 03:42 AM    IBILI 2.4 10/16/2021 03:42 AM    LABALBU 3.3 07/25/2022 06:10 AM     PT/INR:    Lab Results   Component Value Date/Time    PROTIME 14.9 07/25/2022 08:45 AM    INR 1.3 07/25/2022 08:45 AM     PTT:    Lab Results   Component Value Date/Time    APTT 31.9 04/08/2022 01:02 PM   [APTT}  Last 3 Troponin:  No results found for: TROPONINI  TSH:    Lab Results   Component Value Date/Time    TSH 2.180 10/15/2021 02:12 PM     VITAMIN B12:   Lab Results   Component Value Date/Time JPICXIBS92 318 04/09/2022 09:04 AM     FOLATE:    Lab Results   Component Value Date/Time    FOLATE 3.8 10/21/2021 07:49 AM     IRON:    Lab Results   Component Value Date/Time    IRON 123 10/15/2021 02:12 PM     Iron Saturation:    Lab Results   Component Value Date/Time    LABIRON 122 10/15/2021 02:12 PM     TIBC:    Lab Results   Component Value Date/Time    TIBC 101 10/15/2021 02:12 PM     FERRITIN:    Lab Results   Component Value Date/Time    FERRITIN 617 07/24/2022 02:39 PM     HIV:  No results found for: HIV  MARKIE:    Lab Results   Component Value Date/Time    MARKIE NEGATIVE 10/15/2021 02:12 PM         CTA PULMONARY W CONTRAST    Result Date: 7/24/2022  EXAMINATION: CTA OF THE CHEST 7/24/2022 4:46 pm TECHNIQUE: CTA of the chest was performed after the administration of intravenous contrast.  Multiplanar reformatted images are provided for review. MIP images are provided for review. Automated exposure control, iterative reconstruction, and/or weight based adjustment of the mA/kV was utilized to reduce the radiation dose to as low as reasonably achievable. COMPARISON: None. HISTORY: ORDERING SYSTEM PROVIDED HISTORY: Evaluate for PE TECHNOLOGIST PROVIDED HISTORY: Reason for exam:->evaluate for PE Decision Support Exception - unselect if not a suspected or confirmed emergency medical condition->Emergency Medical Condition (MA) FINDINGS: Pulmonary Arteries: Pulmonary arteries are adequately opacified for evaluation. No evidence of intraluminal filling defect to suggest pulmonary embolism. Main pulmonary artery is normal in caliber. Mediastinum: No evidence of mediastinal lymphadenopathy. The heart and pericardium demonstrate no acute abnormality. There is no acute abnormality of the thoracic aorta. Lungs/pleura: The lungs are without acute process. No focal consolidation or pulmonary edema. No evidence of pleural effusion or pneumothorax.  Upper Abdomen:  Limited images of the upper abdomen demonstrate no acute abnormality. There is hepatic steatosis. There are perfusional abnormalities with areas of capsular retraction, new since the prior examination. Soft Tissues/Bones: No acute bone or soft tissue abnormality. No evidence of pulmonary embolism or acute pulmonary abnormality. IMPRESSION:  Acute alcoholic hepatitis  Hepatic fibrosis  Elevated LFTs, secondary to above  Confusion  Hyperbilirubinemia  Fatigue  Loss of appetite  Alcohol abuse  Dyspnea-resolved  Cooper-Danlos disease    RECOMMENDATIONS:    CIWA per PCP  Protonix 40 mg twice daily  Alcohol cessation, emotional support given  Has appointment with [facility] gastroenterology, social work and psychiatry tomorrow 7/26/22 per pt  Medical management per PCP  Diet as tolerated  Monitor CBC, CMP and ammonia daily  Supportive care  Discharge per PCP, ok from GI POV will follow up at [facility] tomorrow     Note: This report was completed utilizing computer voice recognition software. Every effort has been made to ensure accuracy, however; inadvertent computerized transcription errors may be present. Thank you very much for your consultation. We will follow closely with you. Discussed with Dr. Jacy Ngo developed by Dr. Alberto Hernandez, JAYA, NP-C 7/25/2022 5:11 PM for Dr. Erich Palacio. I HAD A FACE TO FACE ENCOUNTER WITH THE PATIENT. AGREE WITH THE EXAM, ASSESSMENT, AND PLAN AS OUTLINED ABOVE. ADDITION AND CORRECTIONS WERE DONE AS DEEMED APPROPRIATE. MY EXAM AND PLAN INCLUDE: PATIENT AWAKE AND ALERT UNFORTUNATELY CONTINUE TO DRINKS. HAS NO COMPLAINTS. APPEARS COMPENSATING WELL. HAS MULTIPLE APPOINTMENTS TOMORROW WITH [facility] WITH SOCIAL WORK, [facility] AND GASTROENTEROLOGIST FOR LONG TERM PLANNING. I FEEL SHE IS OK TO BE DISCHARGED IN VIEW OF EXCELLENT STABLE CONDITION AND I THAT SHE WILL BE EXAMINED BY [facility] TOMORROW.

## 2022-07-25 NOTE — PROGRESS NOTES
Pulse ox was__98__% on room air at rest.  Ambulated patient on room air. Oxygen saturation was _93-99____% on room air while ambulating.     Electronically signed by Freeman Gonzalez RN on 7/25/2022 at 3:00 PM

## 2022-07-25 NOTE — CARE COORDINATION
Transition of Care-Met with patient bedside, admitted with respiratory failure/post covid. Patient is currently on 2L NC, SpO2 99%, will need pulse ox testing prior to discharge, no preference for DME provider. Patient lives with her spouse in a two story home, follows at [facility] for GI and psychiatry, was not interested in speaking to [facility], she has an appointment tomorrow and would like to be discharged today as to not miss her appointments. PCP is Dr. Ida Barrett and preferred pharmacy is [facility], patient denies any discharge needs at this time her  will transport home.     Jaden GONZALEZN, RN  [facility]

## 2022-07-26 VITALS
BODY MASS INDEX: 23.53 KG/M2 | TEMPERATURE: 98.2 F | SYSTOLIC BLOOD PRESSURE: 151 MMHG | WEIGHT: 127.9 LBS | HEART RATE: 111 BPM | DIASTOLIC BLOOD PRESSURE: 98 MMHG | RESPIRATION RATE: 16 BRPM | HEIGHT: 62 IN | OXYGEN SATURATION: 97 %

## 2022-07-26 PROBLEM — J96.01 ACUTE RESPIRATORY FAILURE WITH HYPOXIA (HCC): Status: RESOLVED | Noted: 2022-07-25 | Resolved: 2022-07-26

## 2022-07-26 PROBLEM — J96.00 ACUTE RESPIRATORY FAILURE DUE TO COVID-19 (HCC): Status: RESOLVED | Noted: 2022-07-24 | Resolved: 2022-07-26

## 2022-07-26 PROBLEM — U07.1 ACUTE RESPIRATORY FAILURE DUE TO COVID-19 (HCC): Status: RESOLVED | Noted: 2022-07-24 | Resolved: 2022-07-26

## 2022-07-26 LAB
ALBUMIN SERPL-MCNC: 3 G/DL (ref 3.5–5.2)
ALP BLD-CCNC: 298 U/L (ref 35–104)
ALT SERPL-CCNC: 67 U/L (ref 0–32)
AMMONIA: 62.2 UMOL/L (ref 11–51)
ANION GAP SERPL CALCULATED.3IONS-SCNC: 10 MMOL/L (ref 7–16)
AST SERPL-CCNC: 259 U/L (ref 0–31)
BILIRUB SERPL-MCNC: 5.5 MG/DL (ref 0–1.2)
BUN BLDV-MCNC: 3 MG/DL (ref 6–20)
CALCIUM SERPL-MCNC: 7.7 MG/DL (ref 8.6–10.2)
CHLORIDE BLD-SCNC: 102 MMOL/L (ref 98–107)
CO2: 22 MMOL/L (ref 22–29)
CREAT SERPL-MCNC: 0.4 MG/DL (ref 0.5–1)
GFR AFRICAN AMERICAN: >60
GFR NON-AFRICAN AMERICAN: >60 ML/MIN/1.73
GLUCOSE BLD-MCNC: 139 MG/DL (ref 74–99)
INR BLD: 1.4
MAGNESIUM: 1.7 MG/DL (ref 1.6–2.6)
ORGANISM: ABNORMAL
POTASSIUM REFLEX MAGNESIUM: 3.2 MMOL/L (ref 3.5–5)
PROTHROMBIN TIME: 15.4 SEC (ref 9.3–12.4)
SODIUM BLD-SCNC: 134 MMOL/L (ref 132–146)
TOTAL PROTEIN: 5.4 G/DL (ref 6.4–8.3)
URINE CULTURE, ROUTINE: ABNORMAL

## 2022-07-26 PROCEDURE — 80053 COMPREHEN METABOLIC PANEL: CPT

## 2022-07-26 PROCEDURE — 6370000000 HC RX 637 (ALT 250 FOR IP): Performed by: INTERNAL MEDICINE

## 2022-07-26 PROCEDURE — 36415 COLL VENOUS BLD VENIPUNCTURE: CPT

## 2022-07-26 PROCEDURE — 83735 ASSAY OF MAGNESIUM: CPT

## 2022-07-26 PROCEDURE — 82140 ASSAY OF AMMONIA: CPT

## 2022-07-26 PROCEDURE — 6370000000 HC RX 637 (ALT 250 FOR IP): Performed by: STUDENT IN AN ORGANIZED HEALTH CARE EDUCATION/TRAINING PROGRAM

## 2022-07-26 PROCEDURE — 85610 PROTHROMBIN TIME: CPT

## 2022-07-26 RX ORDER — LANOLIN ALCOHOL/MO/W.PET/CERES
100 CREAM (GRAM) TOPICAL DAILY
Qty: 30 TABLET | Refills: 3 | Status: SHIPPED | OUTPATIENT
Start: 2022-07-26

## 2022-07-26 RX ORDER — LANOLIN ALCOHOL/MO/W.PET/CERES
100 CREAM (GRAM) TOPICAL DAILY
Qty: 30 TABLET | Refills: 3 | Status: SHIPPED | OUTPATIENT
Start: 2022-07-26 | End: 2022-07-26 | Stop reason: SDUPTHER

## 2022-07-26 RX ORDER — CEFDINIR 300 MG/1
300 CAPSULE ORAL 2 TIMES DAILY
Qty: 10 CAPSULE | Refills: 0 | Status: SHIPPED | OUTPATIENT
Start: 2022-07-26 | End: 2022-07-31

## 2022-07-26 RX ORDER — CEFDINIR 300 MG/1
300 CAPSULE ORAL 2 TIMES DAILY
Qty: 10 CAPSULE | Refills: 0 | Status: SHIPPED | OUTPATIENT
Start: 2022-07-26 | End: 2022-07-26 | Stop reason: SDUPTHER

## 2022-07-26 RX ADMIN — PANTOPRAZOLE SODIUM 40 MG: 40 TABLET, DELAYED RELEASE ORAL at 06:23

## 2022-07-26 RX ADMIN — POTASSIUM CHLORIDE 40 MEQ: 20 TABLET, EXTENDED RELEASE ORAL at 06:23

## 2022-07-26 RX ADMIN — Medication 100 MG: at 07:37

## 2022-07-26 NOTE — DISCHARGE SUMMARY
Discharge Summary     Patient ID:  Heide Hassan  [id number]  36 y.o. 1980 female  Yaa FERRO DO Yodit        Admit date: 7/24/2022    Discharge date and time:  7/26/2022  6:14 AM      Activity level: As tolerated  Diet: Regular low fat, no alcohol  Labs: CMP in 1 week  Disposition:Home  Condition on Discharge:Stable  DME: None    Admit Diagnoses:   Patient Active Problem List   Diagnosis    Acute liver failure without hepatic coma    Hyperbilirubinemia    Acute alcoholic hepatitis    COVID    Dehydration       Discharge Diagnoses: Principal Problem (Resolved):    Acute respiratory failure due to COVID-19 [facility])  Active Problems:    * No active hospital problems. *  Resolved Problems:    Acute respiratory failure with hypoxia (HCC)      Consults:  IP CONSULT TO SOCIAL WORK  IP CONSULT TO INTERNAL MEDICINE  IP CONSULT TO SOCIAL WORK  IP CONSULT TO PULMONOLOGY  IP CONSULT TO GI    Procedures: None    Hospital Course: Patient is a 36year old who presented to [facility] ER due to worsening shortness of breath and fatigue. She tested positive for covid-19 on 7/9/22 and was treated with monoclonial antibodies. She continued to have shortness of breath and fatigue. She was found to be 84% in ER and required 2 liters of oxygen. CTA negative for PE. She had a UTI and high anion gap metabolic acidosis. She was seen by GI and pulmonary. She received dexamethasone. She was treated with rocephin and transitioned to cefdinir oral for discharge. Patient was discharged home in stable condition. Close follow up with GI, Psychiatry and social work at [facility] this afternoon. Discharge Exam:  Vitals:    07/25/22 2109   BP: (!) 151/98   Pulse: (!) 111   Resp: 16   Temp: 98.2 °F (36.8 °C)   SpO2: 97%       Physical Exam  Constitutional:       Appearance: Normal appearance. HENT:      Head: Normocephalic and atraumatic.       Right Ear: External ear normal.      Left Ear: External ear normal.      Nose: Nose normal. No congestion. Mouth/Throat:      Mouth: Mucous membranes are moist.      Pharynx: Oropharynx is clear. Eyes:      General: Scleral icterus present. Right eye: No discharge. Left eye: No discharge. Extraocular Movements: Extraocular movements intact. Pupils: Pupils are equal, round, and reactive to light. Cardiovascular:      Rate and Rhythm: Normal rate and regular rhythm. Pulses: Normal pulses. Heart sounds:     No gallop. Pulmonary:      Effort: Pulmonary effort is normal. No respiratory distress. Breath sounds: Normal breath sounds. No wheezing, rhonchi or rales. Abdominal:      General: Bowel sounds are normal. There is no distension. Palpations: Abdomen is soft. Tenderness: There is no abdominal tenderness. There is no guarding or rebound. Musculoskeletal:         General: Normal range of motion. Cervical back: Normal range of motion and neck supple. Right lower leg: No edema. Left lower leg: No edema. Skin:     General: Skin is warm and dry. Capillary Refill: Capillary refill takes less than 2 seconds. Neurological:      General: No focal deficit present. Mental Status: She is alert and oriented to person, place, and time. Psychiatric:         Mood and Affect: Mood normal.         Behavior: Behavior normal.        I/O last 3 completed shifts:   In: 981.1 [I.V.:932.4; IV Piggyback:48.6]  Out: -   I/O this shift:  In: 1429.7 [P.O.:60; I.V.:1369.7]  Out: -       LABS:  Recent Labs     07/24/22  1439 07/25/22  0610 07/26/22  0304    135 134   K 3.0* 3.5 3.2*   CL 95* 93* 102   CO2 25 23 22   BUN 10 7 3*   CREATININE 0.4* 0.3* 0.4*   GLUCOSE 86 117* 139*   CALCIUM 7.9* 7.7* 7.7*       Recent Labs     07/24/22  1439 07/25/22  0610   WBC 6.9 4.8   RBC 4.15 3.69   HGB 13.6 12.2   HCT 39.6 35.3   MCV 95.4 95.7   MCH 32.8 33.1   MCHC 34.3 34.6*   RDW 19.2* 18.9*    132   MPV 10.3 11.1       No results thiamine 100 MG tablet Take 1 tablet by mouth in the morning.   Qty: 30 tablet, Refills: 3           CONTINUE these medications which have NOT CHANGED    Details   nicotine (NICODERM CQ) 14 MG/24HR Place 1 patch onto the skin daily  Qty: 30 patch, Refills: 3      pantoprazole (PROTONIX) 40 MG tablet Take 1 tablet by mouth 2 times daily (before meals)  Qty: 30 tablet, Refills: 3           STOP taking these medications       ondansetron (ZOFRAN) 4 MG tablet Comments:   Reason for Stopping:         sertraline (ZOLOFT) 50 MG tablet Comments:   Reason for Stopping:                 Note that more than 30 minutes was spent in preparing discharge papers, discussing discharge with patient, medication review, etc.      Signed:    Bobby Ward DO    Electronically signed by Bobby Ward DO on 7/26/2022 at 6:14 AM

## 2022-07-26 NOTE — DISCHARGE INSTR - COC
2\" (1.575 m)   Wt 127 lb 14.4 oz (58 kg)   SpO2 97%   BMI 23.39 kg/m²     Last documented pain score (0-10 scale): Pain Level: 0  Last Weight:   Wt Readings from Last 1 Encounters:   22 127 lb 14.4 oz (58 kg)     Mental Status:  {IP PT MENTAL STATUS:}    IV Access:  { LORAINE IV ACCESS:930677177}    Nursing Mobility/ADLs:  Walking   {CHP DME BWVN:013265163}  Transfer  {CHP DME QCOD:485091469}  Bathing  {CHP DME UPGW:607665128}  Dressing  {CHP DME GIQ}  Toileting  {CHP DME YRG}  Feeding  {CHP DME JBTO:801593649}  Med Admin  {CHP DME PVTS:064683668}  Med Delivery   { LORAINE MED Delivery:988202207}    Wound Care Documentation and Therapy:        Elimination:  Continence: Bowel: {YES / WK:12111}  Bladder: {YES / P}  Urinary Catheter: {Urinary Catheter:806213141}   Colostomy/Ileostomy/Ileal Conduit: {YES / GM:65428}       Date of Last BM: ***    Intake/Output Summary (Last 24 hours) at 2022 0635  Last data filed at 2022 0528  Gross per 24 hour   Intake 2410.75 ml   Output --   Net 2410.75 ml     I/O last 3 completed shifts:   In: 981.1 [I.V.:932.4; IV Piggyback:48.6]  Out: -     Safety Concerns:     [employer] Safety Concerns:583420835}    Impairments/Disabilities:      [employer] Impairments/Disabilities:945816201}    Nutrition Therapy:  Current Nutrition Therapy:   [employer] Diet List:602347704}    Routes of Feeding: {P DME Other Feedings:682016147}  Liquids: {Slp liquid thickness:41729}  Daily Fluid Restriction: {CHP DME Yes amt example:822200405}  Last Modified Barium Swallow with Video (Video Swallowing Test): {Done Not Done ZJFW:643873461}    Treatments at the Time of Hospital Discharge:   Respiratory Treatments: ***  Oxygen Therapy:  {Therapy; copd oxygen:40070}  Ventilator:    { MIO Vent FPNP:019039400}    Rehab Therapies: {THERAPEUTIC INTERVENTION:1090702925}  Weight Bearing Status/Restrictions: [address] [employer] Weight Bearin}  Other Medical Equipment (for information only, NOT a DME order):  {EQUIPMENT:201777672}  Other Treatments: ***    Patient's personal belongings (please select all that are sent with patient):  {CHP DME Belongings:701419460}    RN SIGNATURE:  {Esignature:128941133}    CASE MANAGEMENT/SOCIAL WORK SECTION    Inpatient Status Date: ***    Readmission Risk Assessment Score:  Readmission Risk              Risk of Unplanned Readmission:  [id number]           Discharging to Facility/ Agency   Name:   Address:  Phone:  Fax:    Dialysis Facility (if applicable)   Name:  Address:  Dialysis Schedule:  Phone:  Fax:    / signature: {Esignature:429689292}    PHYSICIAN SECTION    Prognosis: {Prognosis:9401329774}    Condition at Discharge: [address] [place] [address] Patient Condition:783751995}    Rehab Potential (if transferring to Rehab): {Prognosis:7562367954}    Recommended Labs or Other Treatments After Discharge: ***    Physician Certification: I certify the above information and transfer of Etelvina Wiley  is necessary for the continuing treatment of the diagnosis listed and that she requires {Admit to Appropriate Level of Care:55017} for {GREATER/LESS:008684485} 30 days.      Update Admission H&P: {CHP DME Changes in POYES:635848508}    PHYSICIAN SIGNATURE:  {Esignature:859268753}

## 2022-07-26 NOTE — DISCHARGE INSTR - DIET

## 2022-07-26 NOTE — PLAN OF CARE
Problem: Discharge Planning  Goal: Discharge to home or other facility with appropriate resources  Outcome: Resolved/Met     Problem: Safety - Adult  Goal: Free from fall injury  Outcome: Resolved/Met     Problem: Pain  Goal: Verbalizes/displays adequate comfort level or baseline comfort level  Outcome: Resolved/Met

## 2022-07-29 LAB
BLOOD CULTURE, ROUTINE: NORMAL
CULTURE, BLOOD 2: NORMAL

## 2023-11-09 ENCOUNTER — HOSPITAL ENCOUNTER (EMERGENCY)
Age: 43
Discharge: LEFT BEFORE DISCHARGE | End: 2023-11-09
Payer: COMMERCIAL

## 2023-11-09 ENCOUNTER — APPOINTMENT (OUTPATIENT)
Dept: CT IMAGING | Age: 43
End: 2023-11-09
Payer: COMMERCIAL

## 2023-11-09 VITALS
TEMPERATURE: 97.8 F | HEART RATE: 83 BPM | WEIGHT: 135 LBS | BODY MASS INDEX: 23.92 KG/M2 | OXYGEN SATURATION: 100 % | RESPIRATION RATE: 20 BRPM | DIASTOLIC BLOOD PRESSURE: 68 MMHG | HEIGHT: 63 IN | SYSTOLIC BLOOD PRESSURE: 129 MMHG

## 2023-11-09 DIAGNOSIS — S09.90XA INJURY OF HEAD, INITIAL ENCOUNTER: Primary | ICD-10-CM

## 2023-11-09 PROCEDURE — 70450 CT HEAD/BRAIN W/O DYE: CPT

## 2023-11-09 PROCEDURE — 99284 EMERGENCY DEPT VISIT MOD MDM: CPT

## 2023-11-09 PROCEDURE — 72125 CT NECK SPINE W/O DYE: CPT

## 2023-11-09 ASSESSMENT — PATIENT HEALTH QUESTIONNAIRE - PHQ9
SUM OF ALL RESPONSES TO PHQ QUESTIONS 1-9: 0
SUM OF ALL RESPONSES TO PHQ QUESTIONS 1-9: 0
SUM OF ALL RESPONSES TO PHQ9 QUESTIONS 1 & 2: 0
2. FEELING DOWN, DEPRESSED OR HOPELESS: 0
SUM OF ALL RESPONSES TO PHQ QUESTIONS 1-9: 0
1. LITTLE INTEREST OR PLEASURE IN DOING THINGS: 0
SUM OF ALL RESPONSES TO PHQ QUESTIONS 1-9: 0

## 2023-11-09 ASSESSMENT — LIFESTYLE VARIABLES
HOW OFTEN DO YOU HAVE A DRINK CONTAINING ALCOHOL: 2-4 TIMES A MONTH
HOW MANY STANDARD DRINKS CONTAINING ALCOHOL DO YOU HAVE ON A TYPICAL DAY: 3 OR 4

## 2023-11-09 ASSESSMENT — PAIN DESCRIPTION - LOCATION: LOCATION: HEAD

## 2023-11-09 ASSESSMENT — PAIN DESCRIPTION - DESCRIPTORS: DESCRIPTORS: ACHING

## 2023-11-09 ASSESSMENT — PAIN SCALES - GENERAL: PAINLEVEL_OUTOF10: 7

## 2023-11-09 ASSESSMENT — PAIN - FUNCTIONAL ASSESSMENT: PAIN_FUNCTIONAL_ASSESSMENT: 0-10

## 2023-11-09 ASSESSMENT — PAIN DESCRIPTION - ORIENTATION: ORIENTATION: RIGHT;LEFT

## 2023-11-09 NOTE — ED NOTES
Radiology Procedure Waiver   Name: Marimar Higginbotham  : 1980  MRN: [id number]    Date:  23    Time: 2:34 AM EST    Benefits of immediately proceeding with Radiology exam(s) without pre-testing outweigh the risks or are not indicated as specified below and therefore the following is/are being waived:    [x] Pregnancy test   [] Patients LMP on-time and regular.   [] Patient had Tubal Ligation or has other Contraception Device. [] Patient  is Menopausal or Premenarcheal.    [] Patient had Full or Partial Hysterectomy. [] Protocol for Iodine allergy    [] MRI Questionnaire     [] BUN/Creatinine   [] Patient age w/no hx of renal dysfunction. [] Patient on Dialysis. [] Recent Normal Labs.   Electronically signed by JAYA Salmeron CNP on 23 at 2:34 AM EST               JAYA Salmeron CNP  23 0234
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
## 2023-11-10 ENCOUNTER — HOSPITAL ENCOUNTER (EMERGENCY)
Age: 43
Discharge: LEFT BEFORE DISCHARGE | End: 2023-11-10
Attending: STUDENT IN AN ORGANIZED HEALTH CARE EDUCATION/TRAINING PROGRAM
Payer: COMMERCIAL

## 2023-11-10 ENCOUNTER — APPOINTMENT (OUTPATIENT)
Dept: GENERAL RADIOLOGY | Age: 43
End: 2023-11-10
Payer: COMMERCIAL

## 2023-11-10 VITALS
TEMPERATURE: 98.8 F | BODY MASS INDEX: 24.84 KG/M2 | SYSTOLIC BLOOD PRESSURE: 137 MMHG | WEIGHT: 135 LBS | HEART RATE: 94 BPM | OXYGEN SATURATION: 96 % | RESPIRATION RATE: 16 BRPM | HEIGHT: 62 IN | DIASTOLIC BLOOD PRESSURE: 89 MMHG

## 2023-11-10 DIAGNOSIS — R07.89 CHEST WALL PAIN: Primary | ICD-10-CM

## 2023-11-10 DIAGNOSIS — Z53.21 ELOPED FROM EMERGENCY DEPARTMENT: ICD-10-CM

## 2023-11-10 LAB
ANION GAP SERPL CALCULATED.3IONS-SCNC: 16 MMOL/L (ref 7–16)
BASOPHILS # BLD: 0.11 K/UL (ref 0–0.2)
BASOPHILS NFR BLD: 1 % (ref 0–2)
BUN SERPL-MCNC: 8 MG/DL (ref 6–20)
CALCIUM SERPL-MCNC: 9.1 MG/DL (ref 8.6–10.2)
CHLORIDE SERPL-SCNC: 106 MMOL/L (ref 98–107)
CO2 SERPL-SCNC: 24 MMOL/L (ref 22–29)
CREAT SERPL-MCNC: 0.5 MG/DL (ref 0.5–1)
EKG ATRIAL RATE: 90 BPM
EKG P AXIS: 68 DEGREES
EKG P-R INTERVAL: 132 MS
EKG Q-T INTERVAL: 356 MS
EKG QRS DURATION: 82 MS
EKG QTC CALCULATION (BAZETT): 435 MS
EKG R AXIS: 65 DEGREES
EKG T AXIS: 72 DEGREES
EKG VENTRICULAR RATE: 90 BPM
EOSINOPHIL # BLD: 0.07 K/UL (ref 0.05–0.5)
EOSINOPHILS RELATIVE PERCENT: 1 % (ref 0–6)
ERYTHROCYTE [DISTWIDTH] IN BLOOD BY AUTOMATED COUNT: 16.1 % (ref 11.5–15)
GFR SERPL CREATININE-BSD FRML MDRD: >60 ML/MIN/1.73M2
GLUCOSE SERPL-MCNC: 91 MG/DL (ref 74–99)
HCT VFR BLD AUTO: 44.2 % (ref 34–48)
HGB BLD-MCNC: 14.3 G/DL (ref 11.5–15.5)
IMM GRANULOCYTES # BLD AUTO: <0.03 K/UL (ref 0–0.58)
IMM GRANULOCYTES NFR BLD: 0 % (ref 0–5)
INFLUENZA A BY PCR: NOT DETECTED
INFLUENZA B BY PCR: NOT DETECTED
LYMPHOCYTES NFR BLD: 2.73 K/UL (ref 1.5–4)
LYMPHOCYTES RELATIVE PERCENT: 31 % (ref 20–42)
MCH RBC QN AUTO: 29.4 PG (ref 26–35)
MCHC RBC AUTO-ENTMCNC: 32.4 G/DL (ref 32–34.5)
MCV RBC AUTO: 90.8 FL (ref 80–99.9)
MONOCYTES NFR BLD: 0.65 K/UL (ref 0.1–0.95)
MONOCYTES NFR BLD: 7 % (ref 2–12)
NEUTROPHILS NFR BLD: 59 % (ref 43–80)
NEUTS SEG NFR BLD: 5.23 K/UL (ref 1.8–7.3)
PLATELET # BLD AUTO: 358 K/UL (ref 130–450)
PMV BLD AUTO: 9.8 FL (ref 7–12)
POTASSIUM SERPL-SCNC: 3.9 MMOL/L (ref 3.5–5)
RBC # BLD AUTO: 4.87 M/UL (ref 3.5–5.5)
SARS-COV-2 RDRP RESP QL NAA+PROBE: NOT DETECTED
SODIUM SERPL-SCNC: 146 MMOL/L (ref 132–146)
SPECIMEN DESCRIPTION: NORMAL
TROPONIN I SERPL HS-MCNC: <6 NG/L (ref 0–9)
WBC OTHER # BLD: 8.8 K/UL (ref 4.5–11.5)

## 2023-11-10 PROCEDURE — 93010 ELECTROCARDIOGRAM REPORT: CPT | Performed by: INTERNAL MEDICINE

## 2023-11-10 PROCEDURE — 99285 EMERGENCY DEPT VISIT HI MDM: CPT

## 2023-11-10 PROCEDURE — 87502 INFLUENZA DNA AMP PROBE: CPT

## 2023-11-10 PROCEDURE — 93005 ELECTROCARDIOGRAM TRACING: CPT | Performed by: PHYSICIAN ASSISTANT

## 2023-11-10 PROCEDURE — 84484 ASSAY OF TROPONIN QUANT: CPT

## 2023-11-10 PROCEDURE — 71046 X-RAY EXAM CHEST 2 VIEWS: CPT

## 2023-11-10 PROCEDURE — 85025 COMPLETE CBC W/AUTO DIFF WBC: CPT

## 2023-11-10 PROCEDURE — 80048 BASIC METABOLIC PNL TOTAL CA: CPT

## 2023-11-10 PROCEDURE — 87635 SARS-COV-2 COVID-19 AMP PRB: CPT

## 2023-11-10 ASSESSMENT — PAIN DESCRIPTION - FREQUENCY: FREQUENCY: CONTINUOUS

## 2023-11-10 ASSESSMENT — PAIN - FUNCTIONAL ASSESSMENT: PAIN_FUNCTIONAL_ASSESSMENT: 0-10

## 2023-11-10 ASSESSMENT — PAIN DESCRIPTION - DESCRIPTORS: DESCRIPTORS: DISCOMFORT

## 2023-11-10 ASSESSMENT — PAIN DESCRIPTION - PAIN TYPE: TYPE: ACUTE PAIN

## 2023-11-10 ASSESSMENT — PAIN SCALES - GENERAL: PAINLEVEL_OUTOF10: 4

## 2023-11-10 ASSESSMENT — PAIN DESCRIPTION - ONSET: ONSET: ON-GOING

## 2023-11-10 ASSESSMENT — PAIN DESCRIPTION - LOCATION: LOCATION: CHEST;HEAD

## 2023-11-10 NOTE — ED NOTES
Pt stated that she wanted to leave and pulled out her own IV. Dr. Venkat Patel bedside. Pt upset and left department.      Jhonny Banda RN  11/10/23 [id number]

## 2024-07-29 ENCOUNTER — HOSPITAL ENCOUNTER (EMERGENCY)
Age: 44
Discharge: HOME | End: 2024-07-29
Payer: COMMERCIAL

## 2024-07-29 VITALS
RESPIRATION RATE: 18 BRPM | WEIGHT: 135 LBS | OXYGEN SATURATION: 97 % | BODY MASS INDEX: 24.84 KG/M2 | HEART RATE: 98 BPM | TEMPERATURE: 98.6 F | SYSTOLIC BLOOD PRESSURE: 116 MMHG | DIASTOLIC BLOOD PRESSURE: 71 MMHG | HEIGHT: 62 IN

## 2024-07-29 DIAGNOSIS — T63.464A WASP STING, UNDETERMINED INTENT, INITIAL ENCOUNTER: Primary | ICD-10-CM

## 2024-07-29 PROCEDURE — 99211 OFF/OP EST MAY X REQ PHY/QHP: CPT

## 2024-07-29 RX ORDER — CLINDAMYCIN HYDROCHLORIDE 300 MG/1
300 CAPSULE ORAL 3 TIMES DAILY
Qty: 21 CAPSULE | Refills: 0 | Status: SHIPPED | OUTPATIENT
Start: 2024-07-29 | End: 2024-08-05

## 2024-07-29 RX ORDER — FUROSEMIDE 20 MG/1
20 TABLET ORAL PRN
COMMUNITY

## 2024-07-29 RX ORDER — BUPROPION HYDROCHLORIDE 100 MG/1
150 TABLET ORAL DAILY
COMMUNITY

## 2024-07-29 RX ORDER — OMEPRAZOLE 20 MG/1
CAPSULE, DELAYED RELEASE ORAL DAILY
COMMUNITY

## 2024-07-29 ASSESSMENT — PAIN - FUNCTIONAL ASSESSMENT: PAIN_FUNCTIONAL_ASSESSMENT: 0-10

## 2024-07-29 ASSESSMENT — PAIN SCALES - GENERAL: PAINLEVEL_OUTOF10: 0

## 2024-07-29 NOTE — DISCHARGE INSTRUCTIONS
Topical medications  Ibuprofen for pain and inflammation  Benadryl at night  During day: allegra/claritin/zyrtec

## 2024-07-30 RX ORDER — METHYLPREDNISOLONE 4 MG/1
TABLET ORAL
Qty: 1 KIT | Refills: 0 | Status: SHIPPED | OUTPATIENT
Start: 2024-07-30

## (undated) DEVICE — FORCEPS BX OVL CUP FEN DISPOSABLE CAP L 160CM RAD JAW 4

## (undated) DEVICE — GRADUATE TRIANG MEASURE 1000ML BLK PRNT

## (undated) DEVICE — REAGENT TEST UREASE RAPD CLOTEST F/

## (undated) DEVICE — BLOCK BITE 60FR RUBBER ADLT DENTAL

## (undated) DEVICE — SPONGE GZ W4XL4IN RAYON POLY FILL CVR W/ NONWOVEN FAB